# Patient Record
Sex: MALE | Race: WHITE | NOT HISPANIC OR LATINO | URBAN - METROPOLITAN AREA
[De-identification: names, ages, dates, MRNs, and addresses within clinical notes are randomized per-mention and may not be internally consistent; named-entity substitution may affect disease eponyms.]

---

## 2017-11-15 ENCOUNTER — INPATIENT (INPATIENT)
Facility: HOSPITAL | Age: 77
LOS: 19 days | Discharge: HOSPICE | DRG: 436 | End: 2017-12-05
Attending: SURGERY | Admitting: SURGERY
Payer: MEDICARE

## 2017-11-15 VITALS
HEIGHT: 72 IN | WEIGHT: 186.07 LBS | RESPIRATION RATE: 18 BRPM | TEMPERATURE: 99 F | DIASTOLIC BLOOD PRESSURE: 66 MMHG | SYSTOLIC BLOOD PRESSURE: 103 MMHG | OXYGEN SATURATION: 97 % | HEART RATE: 105 BPM

## 2017-11-15 DIAGNOSIS — Z88.1 ALLERGY STATUS TO OTHER ANTIBIOTIC AGENTS STATUS: ICD-10-CM

## 2017-11-15 DIAGNOSIS — E78.5 HYPERLIPIDEMIA, UNSPECIFIED: ICD-10-CM

## 2017-11-15 DIAGNOSIS — C22.1 INTRAHEPATIC BILE DUCT CARCINOMA: ICD-10-CM

## 2017-11-15 DIAGNOSIS — N40.0 BENIGN PROSTATIC HYPERPLASIA WITHOUT LOWER URINARY TRACT SYMPTOMS: ICD-10-CM

## 2017-11-15 DIAGNOSIS — R63.0 ANOREXIA: ICD-10-CM

## 2017-11-15 DIAGNOSIS — Z87.891 PERSONAL HISTORY OF NICOTINE DEPENDENCE: ICD-10-CM

## 2017-11-15 DIAGNOSIS — C78.00 SECONDARY MALIGNANT NEOPLASM OF UNSPECIFIED LUNG: ICD-10-CM

## 2017-11-15 DIAGNOSIS — I10 ESSENTIAL (PRIMARY) HYPERTENSION: ICD-10-CM

## 2017-11-15 DIAGNOSIS — Z79.82 LONG TERM (CURRENT) USE OF ASPIRIN: ICD-10-CM

## 2017-11-15 DIAGNOSIS — C23 MALIGNANT NEOPLASM OF GALLBLADDER: ICD-10-CM

## 2017-11-15 DIAGNOSIS — D64.9 ANEMIA, UNSPECIFIED: ICD-10-CM

## 2017-11-15 DIAGNOSIS — J91.0 MALIGNANT PLEURAL EFFUSION: ICD-10-CM

## 2017-11-15 DIAGNOSIS — C78.6 SECONDARY MALIGNANT NEOPLASM OF RETROPERITONEUM AND PERITONEUM: ICD-10-CM

## 2017-11-15 DIAGNOSIS — K21.9 GASTRO-ESOPHAGEAL REFLUX DISEASE WITHOUT ESOPHAGITIS: ICD-10-CM

## 2017-11-15 DIAGNOSIS — C78.7 SECONDARY MALIGNANT NEOPLASM OF LIVER AND INTRAHEPATIC BILE DUCT: ICD-10-CM

## 2017-11-15 DIAGNOSIS — Z51.5 ENCOUNTER FOR PALLIATIVE CARE: ICD-10-CM

## 2017-11-15 DIAGNOSIS — K92.2 GASTROINTESTINAL HEMORRHAGE, UNSPECIFIED: ICD-10-CM

## 2017-11-15 DIAGNOSIS — J90 PLEURAL EFFUSION, NOT ELSEWHERE CLASSIFIED: ICD-10-CM

## 2017-11-15 DIAGNOSIS — K92.1 MELENA: ICD-10-CM

## 2017-11-15 DIAGNOSIS — E80.7 DISORDER OF BILIRUBIN METABOLISM, UNSPECIFIED: ICD-10-CM

## 2017-11-15 DIAGNOSIS — Z90.49 ACQUIRED ABSENCE OF OTHER SPECIFIED PARTS OF DIGESTIVE TRACT: Chronic | ICD-10-CM

## 2017-11-15 LAB
ALBUMIN SERPL ELPH-MCNC: 2.3 G/DL — LOW (ref 3.3–5)
ALP SERPL-CCNC: 1810 U/L — HIGH (ref 40–120)
ALT FLD-CCNC: 250 U/L — HIGH (ref 10–45)
ANION GAP SERPL CALC-SCNC: 15 MMOL/L — SIGNIFICANT CHANGE UP (ref 5–17)
APTT BLD: 30.7 SEC — SIGNIFICANT CHANGE UP (ref 27.5–37.4)
AST SERPL-CCNC: 386 U/L — HIGH (ref 10–40)
BILIRUB SERPL-MCNC: 19.1 MG/DL — HIGH (ref 0.2–1.2)
BLD GP AB SCN SERPL QL: NEGATIVE — SIGNIFICANT CHANGE UP
BLD GP AB SCN SERPL QL: NEGATIVE — SIGNIFICANT CHANGE UP
BUN SERPL-MCNC: 28 MG/DL — HIGH (ref 7–23)
CALCIUM SERPL-MCNC: 8.6 MG/DL — SIGNIFICANT CHANGE UP (ref 8.4–10.5)
CHLORIDE SERPL-SCNC: 93 MMOL/L — LOW (ref 96–108)
CO2 SERPL-SCNC: 23 MMOL/L — SIGNIFICANT CHANGE UP (ref 22–31)
CREAT SERPL-MCNC: 1.15 MG/DL — SIGNIFICANT CHANGE UP (ref 0.5–1.3)
GLUCOSE SERPL-MCNC: 126 MG/DL — HIGH (ref 70–99)
HCT VFR BLD CALC: 27.8 % — LOW (ref 39–50)
HGB BLD-MCNC: 9.8 G/DL — LOW (ref 13–17)
INR BLD: 1.93 — HIGH (ref 0.88–1.16)
LYMPHOCYTES # BLD AUTO: 8 % — LOW (ref 13–44)
MCHC RBC-ENTMCNC: 29.8 PG — SIGNIFICANT CHANGE UP (ref 27–34)
MCHC RBC-ENTMCNC: 35.3 G/DL — SIGNIFICANT CHANGE UP (ref 32–36)
MCV RBC AUTO: 84.5 FL — SIGNIFICANT CHANGE UP (ref 80–100)
MONOCYTES NFR BLD AUTO: 6 % — SIGNIFICANT CHANGE UP (ref 2–14)
NEUTROPHILS NFR BLD AUTO: 86 % — HIGH (ref 43–77)
PLATELET # BLD AUTO: 391 K/UL — SIGNIFICANT CHANGE UP (ref 150–400)
POTASSIUM SERPL-MCNC: 4 MMOL/L — SIGNIFICANT CHANGE UP (ref 3.5–5.3)
POTASSIUM SERPL-SCNC: 4 MMOL/L — SIGNIFICANT CHANGE UP (ref 3.5–5.3)
PROT SERPL-MCNC: 6.5 G/DL — SIGNIFICANT CHANGE UP (ref 6–8.3)
PROTHROM AB SERPL-ACNC: 21.7 SEC — HIGH (ref 9.8–12.7)
RBC # BLD: 3.29 M/UL — LOW (ref 4.2–5.8)
RBC # FLD: 16.5 % — SIGNIFICANT CHANGE UP (ref 10.3–16.9)
RH IG SCN BLD-IMP: POSITIVE — SIGNIFICANT CHANGE UP
RH IG SCN BLD-IMP: POSITIVE — SIGNIFICANT CHANGE UP
SODIUM SERPL-SCNC: 131 MMOL/L — LOW (ref 135–145)
WBC # BLD: 17.4 K/UL — HIGH (ref 3.8–10.5)
WBC # FLD AUTO: 17.4 K/UL — HIGH (ref 3.8–10.5)

## 2017-11-15 PROCEDURE — 74183 MRI ABD W/O CNTR FLWD CNTR: CPT | Mod: 26

## 2017-11-15 PROCEDURE — 99222 1ST HOSP IP/OBS MODERATE 55: CPT | Mod: GC

## 2017-11-15 PROCEDURE — 71020: CPT | Mod: 26

## 2017-11-15 PROCEDURE — 93010 ELECTROCARDIOGRAM REPORT: CPT | Mod: 77

## 2017-11-15 PROCEDURE — 99285 EMERGENCY DEPT VISIT HI MDM: CPT | Mod: 25

## 2017-11-15 PROCEDURE — 93010 ELECTROCARDIOGRAM REPORT: CPT

## 2017-11-15 RX ORDER — HYDROCHLOROTHIAZIDE 25 MG
12.5 TABLET ORAL DAILY
Qty: 0 | Refills: 0 | Status: DISCONTINUED | OUTPATIENT
Start: 2017-11-15 | End: 2017-11-22

## 2017-11-15 RX ORDER — MONTELUKAST 4 MG/1
10 TABLET, CHEWABLE ORAL AT BEDTIME
Qty: 0 | Refills: 0 | Status: DISCONTINUED | OUTPATIENT
Start: 2017-11-15 | End: 2017-11-22

## 2017-11-15 RX ORDER — ATORVASTATIN CALCIUM 80 MG/1
10 TABLET, FILM COATED ORAL AT BEDTIME
Qty: 0 | Refills: 0 | Status: DISCONTINUED | OUTPATIENT
Start: 2017-11-15 | End: 2017-11-22

## 2017-11-15 RX ORDER — PIPERACILLIN AND TAZOBACTAM 4; .5 G/20ML; G/20ML
3.38 INJECTION, POWDER, LYOPHILIZED, FOR SOLUTION INTRAVENOUS EVERY 6 HOURS
Qty: 0 | Refills: 0 | Status: DISCONTINUED | OUTPATIENT
Start: 2017-11-15 | End: 2017-11-22

## 2017-11-15 RX ORDER — ONDANSETRON 8 MG/1
4 TABLET, FILM COATED ORAL EVERY 6 HOURS
Qty: 0 | Refills: 0 | Status: DISCONTINUED | OUTPATIENT
Start: 2017-11-15 | End: 2017-11-22

## 2017-11-15 RX ORDER — ASPIRIN/CALCIUM CARB/MAGNESIUM 324 MG
0 TABLET ORAL
Qty: 0 | Refills: 0 | COMMUNITY

## 2017-11-15 RX ORDER — TAMSULOSIN HYDROCHLORIDE 0.4 MG/1
1 CAPSULE ORAL
Qty: 0 | Refills: 0 | COMMUNITY

## 2017-11-15 RX ORDER — HYDROCHLOROTHIAZIDE 25 MG
0 TABLET ORAL
Qty: 0 | Refills: 0 | COMMUNITY

## 2017-11-15 RX ORDER — DILTIAZEM HCL 120 MG
180 CAPSULE, EXT RELEASE 24 HR ORAL DAILY
Qty: 0 | Refills: 0 | Status: DISCONTINUED | OUTPATIENT
Start: 2017-11-15 | End: 2017-11-22

## 2017-11-15 RX ORDER — HYDROMORPHONE HYDROCHLORIDE 2 MG/ML
0.5 INJECTION INTRAMUSCULAR; INTRAVENOUS; SUBCUTANEOUS EVERY 4 HOURS
Qty: 0 | Refills: 0 | Status: DISCONTINUED | OUTPATIENT
Start: 2017-11-15 | End: 2017-11-22

## 2017-11-15 RX ORDER — ATORVASTATIN CALCIUM 80 MG/1
0 TABLET, FILM COATED ORAL
Qty: 0 | Refills: 0 | COMMUNITY

## 2017-11-15 RX ORDER — DILTIAZEM HCL 120 MG
0 CAPSULE, EXT RELEASE 24 HR ORAL
Qty: 0 | Refills: 0 | COMMUNITY

## 2017-11-15 RX ORDER — TAMSULOSIN HYDROCHLORIDE 0.4 MG/1
0.4 CAPSULE ORAL AT BEDTIME
Qty: 0 | Refills: 0 | Status: DISCONTINUED | OUTPATIENT
Start: 2017-11-15 | End: 2017-11-22

## 2017-11-15 RX ORDER — PANTOPRAZOLE SODIUM 20 MG/1
40 TABLET, DELAYED RELEASE ORAL
Qty: 0 | Refills: 0 | Status: DISCONTINUED | OUTPATIENT
Start: 2017-11-15 | End: 2017-11-22

## 2017-11-15 RX ORDER — HEPARIN SODIUM 5000 [USP'U]/ML
5000 INJECTION INTRAVENOUS; SUBCUTANEOUS EVERY 8 HOURS
Qty: 0 | Refills: 0 | Status: DISCONTINUED | OUTPATIENT
Start: 2017-11-15 | End: 2017-11-22

## 2017-11-15 RX ADMIN — Medication 12.5 MILLIGRAM(S): at 15:30

## 2017-11-15 RX ADMIN — HEPARIN SODIUM 5000 UNIT(S): 5000 INJECTION INTRAVENOUS; SUBCUTANEOUS at 22:37

## 2017-11-15 RX ADMIN — Medication 180 MILLIGRAM(S): at 15:30

## 2017-11-15 RX ADMIN — MONTELUKAST 10 MILLIGRAM(S): 4 TABLET, CHEWABLE ORAL at 22:37

## 2017-11-15 RX ADMIN — ATORVASTATIN CALCIUM 10 MILLIGRAM(S): 80 TABLET, FILM COATED ORAL at 22:37

## 2017-11-15 RX ADMIN — HEPARIN SODIUM 5000 UNIT(S): 5000 INJECTION INTRAVENOUS; SUBCUTANEOUS at 15:37

## 2017-11-15 RX ADMIN — PANTOPRAZOLE SODIUM 40 MILLIGRAM(S): 20 TABLET, DELAYED RELEASE ORAL at 15:30

## 2017-11-15 RX ADMIN — PIPERACILLIN AND TAZOBACTAM 200 GRAM(S): 4; .5 INJECTION, POWDER, LYOPHILIZED, FOR SOLUTION INTRAVENOUS at 18:38

## 2017-11-15 RX ADMIN — TAMSULOSIN HYDROCHLORIDE 0.4 MILLIGRAM(S): 0.4 CAPSULE ORAL at 22:37

## 2017-11-15 NOTE — H&P ADULT - NSHPPHYSICALEXAM_GEN_ALL_CORE
Gen: AOx3, NAD, jaundiced    CV: RRR, no m/r/g    Pulm: breathing comfortably on RA, decreased breath sounds on R    Abdomen: minimal tenderness in RUQ, nondistended, no guarding or rebound    Ext: WWP, no edema

## 2017-11-15 NOTE — CONSULT NOTE ADULT - ASSESSMENT
76 yo M, PMH of HTN, HPL, presenting for surgery for possible biliary malignancy, found to have a pleural effusion

## 2017-11-15 NOTE — CONSULT NOTE ADULT - PROBLEM SELECTOR RECOMMENDATION 9
On the R.  Bedside US performed by me demonstrates elevated R hemidiaphragm with some fluid below the diaphragm as well as moderate sized pleural effusion which seems simple without loculations or stranding.  DD includes reactive to liver/GB process vs malignant effusion. No exam findings to support heart failure as a cause of this effusion.  No fever or chills, no history suggestive of a pneumonia with a parapneumonic effusion.  Pt is HD stable, breathing comfortably on RA with normal saturation.  At this point would defer workup of fluid until after primary problem addressed.  If sampling of pleural fluid would assist in staging of this pt - it will be possible to do.

## 2017-11-15 NOTE — CONSULT NOTE ADULT - SUBJECTIVE AND OBJECTIVE BOX
76 yo M, PMH of HTN, arrythmia, presenting for surgery for likely biliary malignancy and obstuction. Consulted for R pleural effusion.  Pt noted some intermittent abd pain, loss of appetite and weakness in the past 2 weeks. PMD sent him for imaging followed by surgery consult for possible GB malignancy.  Denies any fever/chills, no SOB. No history of lung disease.  Past smoker - quit 25 years ago.  Currently feels weak however comfortable, lying in bed.      REVIEW OF SYSTEMS: as per HPI, ROS otherwise negative    PAST MEDICAL & SURGICAL HISTORY:      FAMILY HISTORY:      SOCIAL HISTORY:  Smoking Status: [ ] Current, [x ] Former, [ ] Never  Pack Years:    MEDICATIONS:  Pulmonary:  montelukast 10 milliGRAM(s) Oral at bedtime    Antimicrobials:  piperacillin/tazobactam IVPB. 3.375 Gram(s) IV Intermittent every 6 hours    Anticoagulants:  heparin  Injectable 5000 Unit(s) SubCutaneous every 8 hours    Onc:    GI/:  pantoprazole    Tablet 40 milliGRAM(s) Oral before breakfast    Endocrine:  atorvastatin 10 milliGRAM(s) Oral at bedtime    Cardiac:  diltiazem    milliGRAM(s) Oral daily  hydrochlorothiazide 12.5 milliGRAM(s) Oral daily  tamsulosin 0.4 milliGRAM(s) Oral at bedtime    Other Medications:  HYDROmorphone  Injectable 0.5 milliGRAM(s) IV Push every 4 hours PRN  ondansetron Injectable 4 milliGRAM(s) IV Push every 6 hours PRN      Allergies    erythromycin (Other)    Intolerances        Vital Signs Last 24 Hrs  T(C): 37.2 (15 Nov 2017 14:43), Max: 37.2 (15 Nov 2017 14:15)  T(F): 99 (15 Nov 2017 14:43), Max: 99 (15 Nov 2017 14:15)  HR: 89 (15 Nov 2017 14:43) (88 - 105)  BP: 129/77 (15 Nov 2017 14:43) (103/66 - 129/77)  BP(mean): --  RR: 17 (15 Nov 2017 14:43) (17 - 18)  SpO2: 97% (15 Nov 2017 14:43) (96% - 97%)        PHYSICAL EXAM:    General: Well developed; well nourished; in no acute distress  Eyes: PERRL, EOM intact; +scleral icterus  Head: Normocephalic; atraumatic  ENMT: No nasal discharge; airway clear  Neck: Supple;   Respiratory: decreased BS on the R, no wheezing or crackles  Cardiovascular: Regular rate and rhythm. S1 and S2 Normal;   Gastrointestinal: Soft non-tender non-distended;   Extremities:No clubbing, cyanosis or edema  Vascular: Peripheral pulses palpable 2+ bilaterally  Neurological: Alert and oriented x3  Skin: Warm and dry. significant jaundice  Musculoskeletal: Normal tone, without deformities  Psychiatric: Cooperative and appropriate mood    LABS:      CBC Full  -  ( 15 Nov 2017 12:25 )  WBC Count : 17.4 K/uL  Hemoglobin : 9.8 g/dL  Hematocrit : 27.8 %  Platelet Count - Automated : 391 K/uL  Mean Cell Volume : 84.5 fL  Mean Cell Hemoglobin : 29.8 pg  Mean Cell Hemoglobin Concentration : 35.3 g/dL  Auto Neutrophil # : x  Auto Lymphocyte # : x  Auto Monocyte # : x  Auto Eosinophil # : x  Auto Basophil # : x  Auto Neutrophil % : 86.0 %  Auto Lymphocyte % : 8.0 %  Auto Monocyte % : 6.0 %  Auto Eosinophil % : x  Auto Basophil % : x    11-15    131<L>  |  93<L>  |  28<H>  ----------------------------<  126<H>  4.0   |  23  |  1.15    Ca    8.6      15 Nov 2017 12:25    TPro  6.5  /  Alb  2.3<L>  /  TBili  19.1<H>  /  DBili  x   /  AST  386<H>  /  ALT  250<H>  /  AlkPhos  1810<H>  11-15    PT/INR - ( 15 Nov 2017 12:25 )   PT: 21.7 sec;   INR: 1.93          PTT - ( 15 Nov 2017 12:25 )  PTT:30.7 sec                  RADIOLOGY & ADDITIONAL STUDIES (The following images were personally reviewed): CXR

## 2017-11-15 NOTE — CONSULT NOTE ADULT - SUBJECTIVE AND OBJECTIVE BOX
HPI:  77yr old M with PMHx significant for GERD, HTN, Dyslipidemia, cardiac arrhythmia, Allergic rhinitis, BPH, who was sent to the ED by his outpatient doctors for worrisome findings on CT.  Patient reports that he has been having abdominal discomfort x 2-3weeks (diffuse, intermittent, 5/10, nil radiation, no identified aggravating or alleviating factors), associated with nausea, anorexia, weight loss (15lbs since last month), dark stools and urine. Patient reports that he noticed that his eyes were turning yellow about 4days prior to presentation, and he also had some SOB mostly on exertion. He otherwise denies any emesis, diarrhea, fever, chills, abdominal distention, chest pain, palpitations, diaphoresis, dysuria.  He went to see his PCP for above c/o and was sent for some tests and imaging and referred to a surgeon, and after the tests/imaging came back he was called and told to come to the ED for further evaluation. 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    EGD - never  Colonoscopy - September 28 - Dr Sandoval in [place] per patient normal    PAST MEDICAL & SURGICAL HISTORY:  GERD  BPH (benign prostatic hyperplasia)  HLD (hyperlipidemia)  HTN (hypertension)  History of appendectomy      REVIEW OF SYSTEMS  Apart from items noted in the HPI a 10point ROS was negative	    MEDICATIONS  (STANDING):  atorvastatin 10 milliGRAM(s) Oral at bedtime  diltiazem    milliGRAM(s) Oral daily  heparin  Injectable 5000 Unit(s) SubCutaneous every 8 hours  hydrochlorothiazide 12.5 milliGRAM(s) Oral daily  montelukast 10 milliGRAM(s) Oral at bedtime  pantoprazole    Tablet 40 milliGRAM(s) Oral before breakfast  piperacillin/tazobactam IVPB. 3.375 Gram(s) IV Intermittent every 6 hours  tamsulosin 0.4 milliGRAM(s) Oral at bedtime    MEDICATIONS  (PRN):  HYDROmorphone  Injectable 0.5 milliGRAM(s) IV Push every 4 hours PRN Severe Pain (7 - 10)  ondansetron Injectable 4 milliGRAM(s) IV Push every 6 hours PRN Nausea    Allergies  erythromycin (Other)    Intolerances    SOCIAL HISTORY:  Quit tobacco use 25yrs ago, drinks socially, and denies illicit drug use    FAMILY HISTORY:  ??Hx of gallbladder ca in mother    Vital Signs Last 24 Hrs  T(C): 37.2 (15 Nov 2017 14:43), Max: 37.2 (15 Nov 2017 14:15)  T(F): 99 (15 Nov 2017 14:43), Max: 99 (15 Nov 2017 14:15)  HR: 89 (15 Nov 2017 14:43) (88 - 105)  BP: 129/77 (15 Nov 2017 14:43) (103/66 - 129/77)  BP(mean): --  RR: 17 (15 Nov 2017 14:43) (17 - 18)  SpO2: 97% (15 Nov 2017 14:43) (96% - 97%)    PHYSICAL EXAM:  GEN - elderly male, lying in bed in no distress, +icteric, afebrile, not pale  Eyes: scleral icterus  Respiratory: decreased air entry R lung   Cardiovascular: s1 s2 no M RRR  Gastrointestinal: full, soft, BS+, vague tenderness epigastric region, NG, NR, no masses or organs palpated  Extremities: +pitting LE edema  Neurological: AAOx3 non focal      LABS:               9.8    17.4  )-----------( 391      ( 15 Nov 2017 12:25 )             27.8     11-15    131<L>  |  93<L>  |  28<H>  ----------------------------<  126<H>  4.0   |  23  |  1.15    Ca    8.6      15 Nov 2017 12:25    TPro  6.5  /  Alb  2.3<L>  /  TBili  19.1<H>  /  DBili  x   /  AST  386<H>  /  ALT  250<H>  /  AlkPhos  1810<H>  11-15    PT/INR - ( 15 Nov 2017 12:25 )   PT: 21.7 sec;   INR: 1.93        PTT - ( 15 Nov 2017 12:25 )  PTT:30.7 sec      RADIOLOGY & ADDITIONAL STUDIES:

## 2017-11-15 NOTE — H&P ADULT - ASSESSMENT
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

## 2017-11-15 NOTE — ED PROVIDER NOTE - PHYSICAL EXAMINATION
CONSTITUTIONAL:  no apparent distress.   HEAD: Normocephalic; atraumatic.   EYES: PERRL; EOM intact; conjunctiva clear, +scleral icterus  ENT: normal nose; no rhinorrhea; normal pharynx with no erythema or lesions.   NECK: Supple; non-tender; no LAD  CARDIOVASCULAR: Normal S1, S2; no murmurs, rubs, or gallops. Regular rate and rhythm.   RESPIRATORY: Breathing easily; breath sounds clear and equal bilaterally; no wheezes, rhonchi, or rales.  GI: Soft; non-distended; +ventral hernia, reducible, non-tender; no palpable organomegaly.   MSK: FROM at all extremities, normal tone   EXT: No cyanosis or edema; N/V intact  SKIN: +jaundice   NEURO: A & O x 3; face symmetric; grossly unremarkable.   PSYCHOLOGICAL: The patient’s mood and manner are appropriate. CONSTITUTIONAL:  no apparent distress.   HEAD: Normocephalic; atraumatic.   EYES: PERRL; EOM intact; conjunctiva clear, +scleral icterus  ENT: normal nose; no rhinorrhea; normal pharynx with no erythema or lesions.   NECK: Supple; non-tender; no LAD  CARDIOVASCULAR: Normal S1, S2; no murmurs, rubs, or gallops. Regular rate and rhythm.   RESPIRATORY: Breathing easily; dec BS at R base   GI: Soft; non-distended; +ventral hernia, reducible, non-tender; no palpable organomegaly.   MSK: FROM at all extremities, normal tone   EXT: No cyanosis or edema; N/V intact  SKIN: +jaundice   NEURO: A & O x 3; face symmetric; grossly unremarkable.   PSYCHOLOGICAL: The patient’s mood and manner are appropriate.

## 2017-11-15 NOTE — CONSULT NOTE ADULT - ASSESSMENT
77yr old M with PMHx significant for GERD, HTN, Dyslipidemia, cardiac arrhythmia, Allergic rhinitis, BPH, who was sent to the ED by his outpatient doctors for worrisome findings on CT.    # Hyperbilirubinemia -  - likely from gall bladder cancer with mets to liver  - effusion is likely a malignant effusion  - patient is likely pending an MRCP to delineate his biliary tree   - will probably need palliative stenting to relieve his obstruction  - will plan for an ERCP    Discussed with attending Dr Bonner  GI following

## 2017-11-15 NOTE — H&P ADULT - HISTORY OF PRESENT ILLNESS
78 yo M with PMH HTN, HLD, arrhythmia, BPH presented to ED with c/o intermittent, diffuse abdominal pain for 1 month and SOB for 3 days. Pt reports 15 lb weight loss in last month and decreased appetite. Pt became jaundiced approximately one week ago and went to see his PCP who sent him for an outpatient CT scan. 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 Denies f/c/n/v/CP.

## 2017-11-15 NOTE — ED PROVIDER NOTE - ATTENDING CONTRIBUTION TO CARE
pt seen and examined by me, agree with above. 78 yo male sent by his pmd for possible biliary obstruction to see surgery.  pt with worsening jaundice, loss of appetitie, weight loss.  on exam, mildly ill appearing, nad, skin diffusely jaundiced.  heart normal.  lungs decreased bs in right base.  abd diffusely mildly tender, no rebound or guarding. pt seen by surgery, will be admitted to their service.

## 2017-11-15 NOTE — CONSULT NOTE ADULT - ATTENDING COMMENTS
Pt was seen and examined. Agree with the above. Will do ERCP today.
Patient seen and examined with house-staff during bedside rounds.  Resident note read, including vitals, physical findings, laboratory data, and radiological reports.   Revisions included below.  Direct personal management at bed side and extensive interpretation of the data.  Plan was outlined and discussed in details with the housestaff.  Decision making of high complexity  The US of the chest revealed moderate pleural effusion.  No thoracentesis at this point.  No dyspnea and base line oxygen saturation is normal.  Pulmonary status is optimized

## 2017-11-15 NOTE — ED PROVIDER NOTE - OBJECTIVE STATEMENT
78 yo M with pmh of HTN, HLD, BPH, arrhythmia, appendectomy sent in by Dr. Baker/ Dr. Newell for admission. Pt states he had an abdominal CT, MRI and CXR and was told he had "water in his lungs and a live and GB problem." Pt reports generalized abd pain x 2 weeks with loss of appetite. Associated with sob and generalized weakness. Reports 15 pound wt loss over the past 6 weeks. Also reports brown colored urine. Denies fever, chills, n/v, cp, palpitations, dizziness. Former smoker (quit 25 years ago). Admits to drinking 1 rum drink daily.

## 2017-11-15 NOTE — ED PROVIDER NOTE - MEDICAL DECISION MAKING DETAILS
76 yo M with pmh of HTN, HLD, BPH, arrhythmia, appendectomy sent in by Dr. Baker/ Dr. Newell for admission. Spoke with surgical resident pt has a probably GB malignancy with a biliary obstruction. Will be admitted to surgery. Pre-op labs, CXR, EKG ordered.

## 2017-11-15 NOTE — ED ADULT NURSE NOTE - OBJECTIVE STATEMENT
Patient arrived to ED via walk-in stating, "My doctor told me to come here, I have a liver and gallbladder problem and I have water in my lungs."  Patient is AAOx3, in NAD, jaundiced, presenting for SOB, NEWELL, abdominal pain, lack of appetite for two weeks and nausea.  Patient denies CP, vomiting, fevers, chills or any other complaints.  Crackles auscultated on left, diminished on right.  PIV placed, labs drawn and sent.  Will continue with plan of care.

## 2017-11-16 LAB
ALBUMIN SERPL ELPH-MCNC: 2.1 G/DL — LOW (ref 3.3–5)
ALP SERPL-CCNC: 1563 U/L — HIGH (ref 40–120)
ALT FLD-CCNC: 217 U/L — HIGH (ref 10–45)
ANION GAP SERPL CALC-SCNC: 13 MMOL/L — SIGNIFICANT CHANGE UP (ref 5–17)
AST SERPL-CCNC: 363 U/L — HIGH (ref 10–40)
BILIRUB DIRECT SERPL-MCNC: >10 MG/DL — HIGH (ref 0–0.2)
BILIRUB INDIRECT FLD-MCNC: <6.4 MG/DL — HIGH (ref 0.2–1)
BILIRUB SERPL-MCNC: 16.4 MG/DL — HIGH (ref 0.2–1.2)
BUN SERPL-MCNC: 28 MG/DL — HIGH (ref 7–23)
CALCIUM SERPL-MCNC: 8.2 MG/DL — LOW (ref 8.4–10.5)
CHLORIDE SERPL-SCNC: 96 MMOL/L — SIGNIFICANT CHANGE UP (ref 96–108)
CO2 SERPL-SCNC: 26 MMOL/L — SIGNIFICANT CHANGE UP (ref 22–31)
CREAT SERPL-MCNC: 1.29 MG/DL — SIGNIFICANT CHANGE UP (ref 0.5–1.3)
GLUCOSE SERPL-MCNC: 110 MG/DL — HIGH (ref 70–99)
HCT VFR BLD CALC: 21.2 % — LOW (ref 39–50)
HGB BLD-MCNC: 7.4 G/DL — LOW (ref 13–17)
MAGNESIUM SERPL-MCNC: 2.5 MG/DL — SIGNIFICANT CHANGE UP (ref 1.6–2.6)
MCHC RBC-ENTMCNC: 28.8 PG — SIGNIFICANT CHANGE UP (ref 27–34)
MCHC RBC-ENTMCNC: 34.9 G/DL — SIGNIFICANT CHANGE UP (ref 32–36)
MCV RBC AUTO: 82.5 FL — SIGNIFICANT CHANGE UP (ref 80–100)
PHOSPHATE SERPL-MCNC: 3.6 MG/DL — SIGNIFICANT CHANGE UP (ref 2.5–4.5)
PLATELET # BLD AUTO: 332 K/UL — SIGNIFICANT CHANGE UP (ref 150–400)
POTASSIUM SERPL-MCNC: 3.4 MMOL/L — LOW (ref 3.5–5.3)
POTASSIUM SERPL-SCNC: 3.4 MMOL/L — LOW (ref 3.5–5.3)
PROT SERPL-MCNC: 5.5 G/DL — LOW (ref 6–8.3)
RBC # BLD: 2.57 M/UL — LOW (ref 4.2–5.8)
RBC # FLD: 17.8 % — HIGH (ref 10.3–16.9)
SODIUM SERPL-SCNC: 135 MMOL/L — SIGNIFICANT CHANGE UP (ref 135–145)
WBC # BLD: 14.6 K/UL — HIGH (ref 3.8–10.5)
WBC # FLD AUTO: 14.6 K/UL — HIGH (ref 3.8–10.5)

## 2017-11-16 PROCEDURE — 99232 SBSQ HOSP IP/OBS MODERATE 35: CPT | Mod: GC

## 2017-11-16 RX ORDER — SODIUM CHLORIDE 9 MG/ML
1000 INJECTION, SOLUTION INTRAVENOUS
Qty: 0 | Refills: 0 | Status: DISCONTINUED | OUTPATIENT
Start: 2017-11-16 | End: 2017-11-20

## 2017-11-16 RX ORDER — INDOMETHACIN 50 MG
100 CAPSULE ORAL ONCE
Qty: 0 | Refills: 0 | Status: DISCONTINUED | OUTPATIENT
Start: 2017-11-16 | End: 2017-11-20

## 2017-11-16 RX ORDER — POTASSIUM CHLORIDE 20 MEQ
10 PACKET (EA) ORAL
Qty: 0 | Refills: 0 | Status: COMPLETED | OUTPATIENT
Start: 2017-11-16 | End: 2017-11-16

## 2017-11-16 RX ADMIN — PIPERACILLIN AND TAZOBACTAM 200 GRAM(S): 4; .5 INJECTION, POWDER, LYOPHILIZED, FOR SOLUTION INTRAVENOUS at 13:11

## 2017-11-16 RX ADMIN — PANTOPRAZOLE SODIUM 40 MILLIGRAM(S): 20 TABLET, DELAYED RELEASE ORAL at 06:37

## 2017-11-16 RX ADMIN — TAMSULOSIN HYDROCHLORIDE 0.4 MILLIGRAM(S): 0.4 CAPSULE ORAL at 21:24

## 2017-11-16 RX ADMIN — Medication 180 MILLIGRAM(S): at 06:37

## 2017-11-16 RX ADMIN — Medication 100 MILLIEQUIVALENT(S): at 13:11

## 2017-11-16 RX ADMIN — HYDROMORPHONE HYDROCHLORIDE 0.5 MILLIGRAM(S): 2 INJECTION INTRAMUSCULAR; INTRAVENOUS; SUBCUTANEOUS at 19:34

## 2017-11-16 RX ADMIN — ATORVASTATIN CALCIUM 10 MILLIGRAM(S): 80 TABLET, FILM COATED ORAL at 21:24

## 2017-11-16 RX ADMIN — SODIUM CHLORIDE 100 MILLILITER(S): 9 INJECTION, SOLUTION INTRAVENOUS at 14:35

## 2017-11-16 RX ADMIN — HEPARIN SODIUM 5000 UNIT(S): 5000 INJECTION INTRAVENOUS; SUBCUTANEOUS at 06:37

## 2017-11-16 RX ADMIN — PIPERACILLIN AND TAZOBACTAM 200 GRAM(S): 4; .5 INJECTION, POWDER, LYOPHILIZED, FOR SOLUTION INTRAVENOUS at 06:37

## 2017-11-16 RX ADMIN — MONTELUKAST 10 MILLIGRAM(S): 4 TABLET, CHEWABLE ORAL at 21:24

## 2017-11-16 RX ADMIN — PIPERACILLIN AND TAZOBACTAM 200 GRAM(S): 4; .5 INJECTION, POWDER, LYOPHILIZED, FOR SOLUTION INTRAVENOUS at 00:57

## 2017-11-16 RX ADMIN — HEPARIN SODIUM 5000 UNIT(S): 5000 INJECTION INTRAVENOUS; SUBCUTANEOUS at 14:42

## 2017-11-16 RX ADMIN — Medication 12.5 MILLIGRAM(S): at 06:37

## 2017-11-16 RX ADMIN — HEPARIN SODIUM 5000 UNIT(S): 5000 INJECTION INTRAVENOUS; SUBCUTANEOUS at 21:24

## 2017-11-16 RX ADMIN — PIPERACILLIN AND TAZOBACTAM 200 GRAM(S): 4; .5 INJECTION, POWDER, LYOPHILIZED, FOR SOLUTION INTRAVENOUS at 19:33

## 2017-11-16 RX ADMIN — Medication 100 MILLIEQUIVALENT(S): at 10:37

## 2017-11-16 RX ADMIN — Medication 100 MILLIEQUIVALENT(S): at 14:42

## 2017-11-16 NOTE — PROGRESS NOTE ADULT - SUBJECTIVE AND OBJECTIVE BOX
ON: MRCP obtained, awaiting read. ANKIT SUBJECTIVE: Patient seen and examined bedside by chief resident. Mild abdominal pain. Awaiting GI and Pulm recommendations regarding MRCP results.    diltiazem    milliGRAM(s) Oral daily  heparin  Injectable 5000 Unit(s) SubCutaneous every 8 hours  hydrochlorothiazide 12.5 milliGRAM(s) Oral daily  piperacillin/tazobactam IVPB. 3.375 Gram(s) IV Intermittent every 6 hours  tamsulosin 0.4 milliGRAM(s) Oral at bedtime      Vital Signs Last 24 Hrs  T(C): 36.9 (16 Nov 2017 09:00), Max: 37.2 (15 Nov 2017 14:15)  T(F): 98.4 (16 Nov 2017 09:00), Max: 99 (15 Nov 2017 14:15)  HR: 78 (16 Nov 2017 08:26) (78 - 105)  BP: 115/61 (16 Nov 2017 08:26) (103/66 - 141/67)  BP(mean): 83 (16 Nov 2017 08:26) (81 - 96)  RR: 17 (16 Nov 2017 08:26) (17 - 18)  SpO2: 94% (16 Nov 2017 08:26) (91% - 97%)  I&O's Detail    15 Nov 2017 07:01  -  16 Nov 2017 07:00  --------------------------------------------------------  IN:  Total IN: 0 mL    OUT:    Voided: 150 mL  Total OUT: 150 mL    Total NET: -150 mL          General: NAD, resting comfortably in bed  C/V: NSR  Pulm: Nonlabored breathing, no respiratory distress  Abd: soft, NT/ND.  Extrem: WWP, no edema, SCDs in place        LABS:                        7.4    14.6  )-----------( 332      ( 16 Nov 2017 06:21 )             21.2     11-16    135  |  96  |  28<H>  ----------------------------<  110<H>  3.4<L>   |  26  |  1.29    Ca    8.2<L>      16 Nov 2017 06:19  Phos  3.6     11-16  Mg     2.5     11-16    TPro  5.5<L>  /  Alb  2.1<L>  /  TBili  16.4<H>  /  DBili  >10.0<H>  /  AST  363<H>  /  ALT  217<H>  /  AlkPhos  1563<H>  11-16    PT/INR - ( 15 Nov 2017 12:25 )   PT: 21.7 sec;   INR: 1.93          PTT - ( 15 Nov 2017 12:25 )  PTT:30.7 sec      RADIOLOGY & ADDITIONAL STUDIES:

## 2017-11-16 NOTE — PROGRESS NOTE ADULT - ASSESSMENT
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 Pleural effusion  - Inc WOB  - Pending pulm eval    3) HTN  - diltiazem, HCTZ    4) BPH  - tamsulosin    5) HLD  - Atorvastatin 10 mg    6) Dispo: ? 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  - Carcinoma encasing and obstruction cystic duct and common hepatic duct, with intrahepatic ductal dilation.  - GI fellow to f/u after speaking to attending for ERCP    2) R. Pleural effusion  - Inc WOB  - Awaiting pulm re-evaluation in light of MRCP findings - possible palliative pleural catheter placement    3) HTN  - diltiazem, HCTZ    4) BPH  - tamsulosin    5) HLD  - Atorvastatin 10 mg    6) Dispo: ?

## 2017-11-16 NOTE — PROGRESS NOTE ADULT - ASSESSMENT
76 yo M, PMH of HTN, HPL, presenting with obstructive jaundice possible 2/2 cholangiocarcinoma, found to have a pleural effusion

## 2017-11-16 NOTE — PROGRESS NOTE ADULT - SUBJECTIVE AND OBJECTIVE BOX
Interval Events:  Patient seen and examined at bedside. Breathing is non labored, +abd pain.    MEDICATIONS:  Pulmonary:  montelukast 10 milliGRAM(s) Oral at bedtime    Antimicrobials:  piperacillin/tazobactam IVPB. 3.375 Gram(s) IV Intermittent every 6 hours    Anticoagulants:  heparin  Injectable 5000 Unit(s) SubCutaneous every 8 hours    Cardiac:  diltiazem    milliGRAM(s) Oral daily  hydrochlorothiazide 12.5 milliGRAM(s) Oral daily  tamsulosin 0.4 milliGRAM(s) Oral at bedtime      Allergies    erythromycin (Other)    Intolerances        Vital Signs Last 24 Hrs  T(C): 37.2 (16 Nov 2017 18:55), Max: 38.2 (16 Nov 2017 16:46)  T(F): 99 (16 Nov 2017 18:55), Max: 100.7 (16 Nov 2017 16:46)  HR: 62 (16 Nov 2017 18:55) (62 - 90)  BP: 146/62 (16 Nov 2017 18:55) (114/60 - 146/62)  BP(mean): 85 (16 Nov 2017 13:08) (81 - 96)  RR: 17 (16 Nov 2017 18:55) (16 - 18)  SpO2: 94% (16 Nov 2017 18:55) (91% - 95%)    11-15 @ 07:01  -  11-16 @ 07:00  --------------------------------------------------------  IN: 0 mL / OUT: 150 mL / NET: -150 mL    11-16 @ 07:01  -  11-16 @ 19:35  --------------------------------------------------------  IN: 500 mL / OUT: 250 mL / NET: 250 mL          PHYSICAL EXAM:  General: Well developed; in no acute distress  HEENT: PERRL, EOMI, + scleral icterous  Neck: Supple;   Respiratory: decreased BS on the R base  Cardiovascular: Regular rate and rhythm. S1 and S2 Normal;   Gastrointestinal: Soft, tender, non-distended; no rebound or guarding.  Extremities: WWP, no edema, no cyanosis  Neurological: Alert and oriented x3  Skin: Warm and dry. No obvious rash    LABS:      CBC Full  -  ( 16 Nov 2017 06:21 )  WBC Count : 14.6 K/uL  Hemoglobin : 7.4 g/dL  Hematocrit : 21.2 %  Platelet Count - Automated : 332 K/uL  Mean Cell Volume : 82.5 fL  Mean Cell Hemoglobin : 28.8 pg  Mean Cell Hemoglobin Concentration : 34.9 g/dL  Auto Neutrophil # : x  Auto Lymphocyte # : x  Auto Monocyte # : x  Auto Eosinophil # : x  Auto Basophil # : x  Auto Neutrophil % : x  Auto Lymphocyte % : x  Auto Monocyte % : x  Auto Eosinophil % : x  Auto Basophil % : x    11-16    135  |  96  |  28<H>  ----------------------------<  110<H>  3.4<L>   |  26  |  1.29    Ca    8.2<L>      16 Nov 2017 06:19  Phos  3.6     11-16  Mg     2.5     11-16    TPro  5.5<L>  /  Alb  2.1<L>  /  TBili  16.4<H>  /  DBili  >10.0<H>  /  AST  363<H>  /  ALT  217<H>  /  AlkPhos  1563<H>  11-16    PT/INR - ( 15 Nov 2017 12:25 )   PT: 21.7 sec;   INR: 1.93          PTT - ( 15 Nov 2017 12:25 )  PTT:30.7 sec                  RADIOLOGY imaging reviewed

## 2017-11-16 NOTE — PROGRESS NOTE ADULT - PROBLEM SELECTOR PLAN 1
moderate size on the R as seen on bedside US yesterday.  DD includes reactive 2/2 to abdominal process with seen ascites vs malignant effusion.  Pt HD stable, breathing comfortably on RA with normal oxygen saturation.  At this point no need for a therapeutic thoracentesis.   The yield of cytology for a malignant effusion is only 40%, therefore if need for staging, this is not optimal. moderate size on the R as seen on bedside US yesterday.  DD includes reactive 2/2 to abdominal process with seen ascites vs malignant effusion.  Pt HD stable, breathing comfortably on RA with normal oxygen saturation.  At this point no need for a therapeutic thoracentesis.   The yield of cytology for a malignant effusion is only 40%, therefore if need for staging, this is not optimal.  The patient is not in respiratory distress,  Palliative consult especially the patient is inoperable.  Will follow clinically

## 2017-11-17 LAB
ALBUMIN SERPL ELPH-MCNC: 2.5 G/DL — LOW (ref 3.3–5)
ALP SERPL-CCNC: 1574 U/L — HIGH (ref 40–120)
ALT FLD-CCNC: 227 U/L — HIGH (ref 10–45)
ANION GAP SERPL CALC-SCNC: 13 MMOL/L — SIGNIFICANT CHANGE UP (ref 5–17)
AST SERPL-CCNC: 337 U/L — HIGH (ref 10–40)
BILIRUB SERPL-MCNC: 15.7 MG/DL — HIGH (ref 0.2–1.2)
BUN SERPL-MCNC: 28 MG/DL — HIGH (ref 7–23)
CALCIUM SERPL-MCNC: 7.8 MG/DL — LOW (ref 8.4–10.5)
CHLORIDE SERPL-SCNC: 95 MMOL/L — LOW (ref 96–108)
CO2 SERPL-SCNC: 27 MMOL/L — SIGNIFICANT CHANGE UP (ref 22–31)
CREAT SERPL-MCNC: 1.35 MG/DL — HIGH (ref 0.5–1.3)
GLUCOSE SERPL-MCNC: 142 MG/DL — HIGH (ref 70–99)
HCT VFR BLD CALC: 18.5 % — CRITICAL LOW (ref 39–50)
HCT VFR BLD CALC: 21.9 % — LOW (ref 39–50)
HGB BLD-MCNC: 6.6 G/DL — CRITICAL LOW (ref 13–17)
HGB BLD-MCNC: 7.7 G/DL — LOW (ref 13–17)
MAGNESIUM SERPL-MCNC: 2.7 MG/DL — HIGH (ref 1.6–2.6)
MCHC RBC-ENTMCNC: 29.2 PG — SIGNIFICANT CHANGE UP (ref 27–34)
MCHC RBC-ENTMCNC: 29.9 PG — SIGNIFICANT CHANGE UP (ref 27–34)
MCHC RBC-ENTMCNC: 35.2 G/DL — SIGNIFICANT CHANGE UP (ref 32–36)
MCHC RBC-ENTMCNC: 35.7 G/DL — SIGNIFICANT CHANGE UP (ref 32–36)
MCV RBC AUTO: 83 FL — SIGNIFICANT CHANGE UP (ref 80–100)
MCV RBC AUTO: 83.7 FL — SIGNIFICANT CHANGE UP (ref 80–100)
PHOSPHATE SERPL-MCNC: 4 MG/DL — SIGNIFICANT CHANGE UP (ref 2.5–4.5)
PLATELET # BLD AUTO: 317 K/UL — SIGNIFICANT CHANGE UP (ref 150–400)
PLATELET # BLD AUTO: 327 K/UL — SIGNIFICANT CHANGE UP (ref 150–400)
POTASSIUM SERPL-MCNC: 4 MMOL/L — SIGNIFICANT CHANGE UP (ref 3.5–5.3)
POTASSIUM SERPL-SCNC: 4 MMOL/L — SIGNIFICANT CHANGE UP (ref 3.5–5.3)
PROT SERPL-MCNC: 5.5 G/DL — LOW (ref 6–8.3)
RBC # BLD: 2.21 M/UL — LOW (ref 4.2–5.8)
RBC # BLD: 2.64 M/UL — LOW (ref 4.2–5.8)
RBC # FLD: 17.2 % — HIGH (ref 10.3–16.9)
RBC # FLD: 18.1 % — HIGH (ref 10.3–16.9)
SODIUM SERPL-SCNC: 135 MMOL/L — SIGNIFICANT CHANGE UP (ref 135–145)
WBC # BLD: 16.7 K/UL — HIGH (ref 3.8–10.5)
WBC # BLD: 20.9 K/UL — HIGH (ref 3.8–10.5)
WBC # FLD AUTO: 16.7 K/UL — HIGH (ref 3.8–10.5)
WBC # FLD AUTO: 20.9 K/UL — HIGH (ref 3.8–10.5)

## 2017-11-17 PROCEDURE — 99232 SBSQ HOSP IP/OBS MODERATE 35: CPT | Mod: GC

## 2017-11-17 RX ADMIN — SODIUM CHLORIDE 150 MILLILITER(S): 9 INJECTION, SOLUTION INTRAVENOUS at 17:55

## 2017-11-17 RX ADMIN — PANTOPRAZOLE SODIUM 40 MILLIGRAM(S): 20 TABLET, DELAYED RELEASE ORAL at 07:07

## 2017-11-17 RX ADMIN — PIPERACILLIN AND TAZOBACTAM 200 GRAM(S): 4; .5 INJECTION, POWDER, LYOPHILIZED, FOR SOLUTION INTRAVENOUS at 07:07

## 2017-11-17 RX ADMIN — ATORVASTATIN CALCIUM 10 MILLIGRAM(S): 80 TABLET, FILM COATED ORAL at 21:10

## 2017-11-17 RX ADMIN — PIPERACILLIN AND TAZOBACTAM 200 GRAM(S): 4; .5 INJECTION, POWDER, LYOPHILIZED, FOR SOLUTION INTRAVENOUS at 01:44

## 2017-11-17 RX ADMIN — PIPERACILLIN AND TAZOBACTAM 200 GRAM(S): 4; .5 INJECTION, POWDER, LYOPHILIZED, FOR SOLUTION INTRAVENOUS at 13:27

## 2017-11-17 RX ADMIN — Medication 180 MILLIGRAM(S): at 07:07

## 2017-11-17 RX ADMIN — HEPARIN SODIUM 5000 UNIT(S): 5000 INJECTION INTRAVENOUS; SUBCUTANEOUS at 07:07

## 2017-11-17 RX ADMIN — MONTELUKAST 10 MILLIGRAM(S): 4 TABLET, CHEWABLE ORAL at 21:10

## 2017-11-17 RX ADMIN — PIPERACILLIN AND TAZOBACTAM 200 GRAM(S): 4; .5 INJECTION, POWDER, LYOPHILIZED, FOR SOLUTION INTRAVENOUS at 17:55

## 2017-11-17 RX ADMIN — HEPARIN SODIUM 5000 UNIT(S): 5000 INJECTION INTRAVENOUS; SUBCUTANEOUS at 21:10

## 2017-11-17 RX ADMIN — Medication 12.5 MILLIGRAM(S): at 07:07

## 2017-11-17 RX ADMIN — TAMSULOSIN HYDROCHLORIDE 0.4 MILLIGRAM(S): 0.4 CAPSULE ORAL at 21:10

## 2017-11-17 RX ADMIN — HEPARIN SODIUM 5000 UNIT(S): 5000 INJECTION INTRAVENOUS; SUBCUTANEOUS at 13:27

## 2017-11-17 NOTE — DIETITIAN INITIAL EVALUATION ADULT. - ORAL INTAKE PTA
evidenced by 15lb wt loss in 3 weeks and pt reporting lack of appetite 2/2 abdominal discomfort and nausea/poor

## 2017-11-17 NOTE — PROGRESS NOTE ADULT - SUBJECTIVE AND OBJECTIVE BOX
Pt seen and examined at bedside  No new c/o today  Feels better  Had one episode of melena/bloody bowel movement this am  Denies n/v, abdominal pain, fever, chills    MEDICATIONS:  MEDICATIONS  (STANDING):  atorvastatin 10 milliGRAM(s) Oral at bedtime  diltiazem    milliGRAM(s) Oral daily  heparin  Injectable 5000 Unit(s) SubCutaneous every 8 hours  hydrochlorothiazide 12.5 milliGRAM(s) Oral daily  indomethacin Suppository 100 milliGRAM(s) Rectal once  lactated ringers. 1000 milliLiter(s) (150 mL/Hr) IV Continuous <Continuous>  montelukast 10 milliGRAM(s) Oral at bedtime  pantoprazole    Tablet 40 milliGRAM(s) Oral before breakfast  piperacillin/tazobactam IVPB. 3.375 Gram(s) IV Intermittent every 6 hours  tamsulosin 0.4 milliGRAM(s) Oral at bedtime    MEDICATIONS  (PRN):  HYDROmorphone  Injectable 0.5 milliGRAM(s) IV Push every 4 hours PRN Severe Pain (7 - 10)  ondansetron Injectable 4 milliGRAM(s) IV Push every 6 hours PRN Nausea    Allergies  erythromycin (Other)    Intolerances      Vital Signs Last 24 Hrs  T(C): 36.1 (17 Nov 2017 05:38), Max: 38.2 (16 Nov 2017 16:46)  T(F): 96.9 (17 Nov 2017 05:38), Max: 100.7 (16 Nov 2017 16:46)  HR: 71 (17 Nov 2017 05:38) (62 - 83)  BP: 123/71 (17 Nov 2017 05:38) (119/59 - 146/62)  BP(mean): 85 (16 Nov 2017 13:08) (85 - 85)  RR: 16 (17 Nov 2017 05:38) (16 - 17)  SpO2: 98% (17 Nov 2017 05:38) (94% - 98%)    11-16 @ 07:01  -  11-17 @ 07:00  --------------------------------------------------------  IN: 1980 mL / OUT: 250 mL / NET: 1730 mL    11-17 @ 07:01  -  11-17 @ 10:02  --------------------------------------------------------  IN: 480 mL / OUT: 0 mL / NET: 480 mL      PHYSICAL EXAM:  GEN - elderly male, lying in bed in no distress, +icteric, afebrile, not pale  Eyes: scleral icterus  Gastrointestinal: full, soft, BS+, vague tenderness epigastric region, NG, NR, no masses or organs palpated  Neurological: AAOx3 non focal    LABS:  CBC Full  -  ( 17 Nov 2017 06:59 )  WBC Count : 16.7 K/uL  Hemoglobin : 6.6 g/dL  Hematocrit : 18.5 %  Platelet Count - Automated : 327 K/uL  Mean Cell Volume : 83.7 fL  Mean Cell Hemoglobin : 29.9 pg  Mean Cell Hemoglobin Concentration : 35.7 g/dL    11-17    135  |  95<L>  |  28<H>  ----------------------------<  142<H>  4.0   |  27  |  1.35<H>    Ca    7.8<L>      17 Nov 2017 07:18  Phos  4.0     11-17  Mg     2.7     11-17    TPro  5.5<L>  /  Alb  2.5<L>  /  TBili  15.7<H>  /  DBili  >10.0<H>  /  AST  337<H>  /  ALT  227<H>  /  AlkPhos  1574<H>  11-17    PT/INR - ( 15 Nov 2017 12:25 )   PT: 21.7 sec;   INR: 1.93       PTT - ( 15 Nov 2017 12:25 )  PTT:30.7 sec      RADIOLOGY & ADDITIONAL STUDIES (The following images were personally reviewed):  < from: MR Abdomen w/wo IV Cont (11.15.17 @ 22:28) >  IMPRESSION:  1. Multifocal variable sized liver masses, majority of which demonstrate central nonenhancement, including the largest masses about the gallbladder fossa. Heterogeneous region of enhancement in the brigette hepatis with apparent encasement of the cystic and common hepatic ducts with resultant extensive intrahepatic biliary ductal dilatation.   Constellation of findings is highly concerning for cholangiocarcinoma, with probably intraductal extension of tumor.    3. Distended gallbladder with internal fluid-fluid level and dependent gallstones, with loss of normal surrounding soft tissues/fat planes between the fundus and the adjacent liver. This could either represent regional tumor involvement versus superimposed cholecystitis.    4. Several scattered heterogeneous enhancing soft tissue foci along the omental surfaces of the upper abdominal bowel loops, suggestive of omental metastatic implants.    5. Small amount of upper abdominal ascites.    6. 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 This is of unclear etiology.    7. Moderate to large sized layering right pleural effusion with subjacent compressive atelectasis of the right lower lobe.

## 2017-11-17 NOTE — PROGRESS NOTE ADULT - ASSESSMENT
77yr old M with PMHx significant for GERD, HTN, Dyslipidemia, cardiac arrhythmia, Allergic rhinitis, BPH, who was sent to the ED by his outpatient doctors for worrisome findings on CT.    # Hyperbilirubinemia -  - likely from gall bladder cancer with mets to liver  - effusion is likely a malignant effusion  - sp MRCP with CHD and CBD strictures from cholangiocarcinoma with intraductal extension of tumor  - sp ERCP 11/16/17 - with sphincterotomy and placement of 2 uncovered metal stents into R and L hepatic ducts  - trend bilirubin    # Anemia -   - likely from melena and rectal bleeding   - could be upper GI bleed vs R sided lower GI bleed  - transfuse to Hgb >7  - PPI BID  - might consider EGD and c-scope to evaluate source of bleeding    Discussed with attending Dr Bonner  GI following

## 2017-11-17 NOTE — PROGRESS NOTE ADULT - SUBJECTIVE AND OBJECTIVE BOX
Interval Events:  Patient seen and examined at bedside. ANKIT overnight, breathing is "not bad".    MEDICATIONS:  Pulmonary:  montelukast 10 milliGRAM(s) Oral at bedtime    Antimicrobials:  piperacillin/tazobactam IVPB. 3.375 Gram(s) IV Intermittent every 6 hours    Anticoagulants:  heparin  Injectable 5000 Unit(s) SubCutaneous every 8 hours    Cardiac:  diltiazem    milliGRAM(s) Oral daily  hydrochlorothiazide 12.5 milliGRAM(s) Oral daily  tamsulosin 0.4 milliGRAM(s) Oral at bedtime      Allergies    erythromycin (Other)    Intolerances        Vital Signs Last 24 Hrs  T(C): 36.4 (17 Nov 2017 20:29), Max: 36.7 (17 Nov 2017 17:51)  T(F): 97.6 (17 Nov 2017 20:29), Max: 98.1 (17 Nov 2017 17:51)  HR: 75 (17 Nov 2017 20:29) (71 - 77)  BP: 132/73 (17 Nov 2017 20:29) (116/72 - 132/73)  BP(mean): --  RR: 16 (17 Nov 2017 20:29) (16 - 17)  SpO2: 95% (17 Nov 2017 20:29) (95% - 99%)    11-16 @ 07:01  -  11-17 @ 07:00  --------------------------------------------------------  IN: 1980 mL / OUT: 250 mL / NET: 1730 mL    11-17 @ 07:01  -  11-17 @ 21:56  --------------------------------------------------------  IN: 1040 mL / OUT: 0 mL / NET: 1040 mL          PHYSICAL EXAM:  General: Well developed; well nourished; in no acute distress  HEENT: PERRL, EOMI, significant icterous  Neck: Supple;  Respiratory:decreased BS on the R  Cardiovascular: Regular rate and rhythm. S1 and S2 Normal;   Gastrointestinal: Soft tender non-distended;   Extremities:WWP, no edema, no cyanosis  Neurological: Alert and oriented x3  Skin: Warm and dry. No obvious rash, sig jaundice    LABS:      CBC Full  -  ( 17 Nov 2017 20:24 )  WBC Count : 20.9 K/uL  Hemoglobin : 7.7 g/dL  Hematocrit : 21.9 %  Platelet Count - Automated : 317 K/uL  Mean Cell Volume : 83.0 fL  Mean Cell Hemoglobin : 29.2 pg  Mean Cell Hemoglobin Concentration : 35.2 g/dL  Auto Neutrophil # : x  Auto Lymphocyte # : x  Auto Monocyte # : x  Auto Eosinophil # : x  Auto Basophil # : x  Auto Neutrophil % : x  Auto Lymphocyte % : x  Auto Monocyte % : x  Auto Eosinophil % : x  Auto Basophil % : x    11-17    135  |  95<L>  |  28<H>  ----------------------------<  142<H>  4.0   |  27  |  1.35<H>    Ca    7.8<L>      17 Nov 2017 07:18  Phos  4.0     11-17  Mg     2.7     11-17    TPro  5.5<L>  /  Alb  2.5<L>  /  TBili  15.7<H>  /  DBili  >10.0<H>  /  AST  337<H>  /  ALT  227<H>  /  AlkPhos  1574<H>  11-17                      RADIOLOGY imaging reviewed

## 2017-11-17 NOTE — PROGRESS NOTE ADULT - PROBLEM SELECTOR PLAN 1
moderate size on the R as seen on bedside US  DD includes reactive 2/2 to abdominal process vs malignant effusion.  Currently HD stable, breathing comfortably on RA with normal oxygen saturation (95% RA).  At this point no need for a therapeutic thoracentesis.

## 2017-11-17 NOTE — DIETITIAN INITIAL EVALUATION ADULT. - ENERGY NEEDS
Height: 6'0" Weight: 186lbs, IBW 176lbs+/-10%, %%, BMI 25  ABW used for calculations as pt between % of IBW.  Needs adjusted 2/2 age, unintentional wt loss and hypermetabolic state

## 2017-11-17 NOTE — DIETITIAN INITIAL EVALUATION ADULT. - OTHER INFO
78yo M with h/o BPH w/ likely primary gallbladder neoplasm w/ spread to liver and R pleural effusion. 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 NPO/Clears x3days. Currently on clear liquid diet and tolerating PO. Consuming 100% meals and 1/2 EnsureClears. Reports 15lb wt loss over last 3 weeks. Pt attributes it to abdominal discomfort, nausea and overall lack of appetite/desire to eat.  Discussed purpose of EnsureClears on CLD and option of other ONS as diet advances. Denies GI distress at present. Pain controlled. NKFA or dietary restrictions. Skin: jaundiced; GI: distended per flowsheet.

## 2017-11-17 NOTE — PROGRESS NOTE ADULT - SUBJECTIVE AND OBJECTIVE BOX
ON: Dr. Hare recommends no intervention on effusion, since patient is asymptomatic. ERCP w/ stent placement, markedly dilated hepatic ducts. CLD O/N.   11/16: MRCP shows primary cholangiocarcinoma encasing and obstructing cystic and common hepatic ducts. ERCP today. Some bright red blood on toilet paper after BM today. Rectal exam showed external hemorrhoids. SUBJECTIVE: Patient seen and examined bedside by chief resident. No acute events. Tolerated  blood transfusion well.    diltiazem    milliGRAM(s) Oral daily  heparin  Injectable 5000 Unit(s) SubCutaneous every 8 hours  hydrochlorothiazide 12.5 milliGRAM(s) Oral daily  piperacillin/tazobactam IVPB. 3.375 Gram(s) IV Intermittent every 6 hours  tamsulosin 0.4 milliGRAM(s) Oral at bedtime      Vital Signs Last 24 Hrs  T(C): 36.3 (17 Nov 2017 13:38), Max: 37.2 (16 Nov 2017 18:55)  T(F): 97.4 (17 Nov 2017 13:38), Max: 99 (16 Nov 2017 18:55)  HR: 75 (17 Nov 2017 13:38) (62 - 83)  BP: 121/74 (17 Nov 2017 13:38) (116/72 - 146/62)  BP(mean): --  RR: 16 (17 Nov 2017 13:38) (16 - 17)  SpO2: 99% (17 Nov 2017 13:38) (94% - 99%)  I&O's Detail    16 Nov 2017 07:01  -  17 Nov 2017 07:00  --------------------------------------------------------  IN:    IV PiggyBack: 250 mL    lactated ringers.: 1350 mL    Oral Fluid: 280 mL    Solution: 100 mL  Total IN: 1980 mL    OUT:    Voided: 250 mL  Total OUT: 250 mL    Total NET: 1730 mL      17 Nov 2017 07:01  -  17 Nov 2017 17:48  --------------------------------------------------------  IN:    Oral Fluid: 800 mL  Total IN: 800 mL    OUT:  Total OUT: 0 mL    Total NET: 800 mL          General: NAD, resting comfortably in bed  C/V: NSR  Pulm: Nonlabored breathing, no respiratory distress  Abd: soft, NT/ND.  Extrem: WWP, no edema, SCDs in place        LABS:                        6.6    16.7  )-----------( 327      ( 17 Nov 2017 06:59 )             18.5     11-17    135  |  95<L>  |  28<H>  ----------------------------<  142<H>  4.0   |  27  |  1.35<H>    Ca    7.8<L>      17 Nov 2017 07:18  Phos  4.0     11-17  Mg     2.7     11-17    TPro  5.5<L>  /  Alb  2.5<L>  /  TBili  15.7<H>  /  DBili  >10.0<H>  /  AST  337<H>  /  ALT  227<H>  /  AlkPhos  1574<H>  11-17          RADIOLOGY & ADDITIONAL STUDIES:

## 2017-11-17 NOTE — DIETITIAN INITIAL EVALUATION ADULT. - NS AS NUTRI INTERV ED CONTENT
diet advancement process, purpose of EnsureClears on CLD, option of ONS as diet advances./Purpose of the nutrition education

## 2017-11-17 NOTE — PROGRESS NOTE ADULT - ASSESSMENT
76 yo M with PMH HTN, HLD, arrhythmia, BPH with likely primary gallbladder neoplasm with spread to liver and R pleural effusion    1) Primary cholangiocarcinoma  - MRCP suggests primary cholangiocarcinoma with mets to omentum, liver, and lung.  - Carcinoma encasing and obstruction cystic duct and common hepatic duct, with intrahepatic ductal dilation.  - ERCP w/ stent placement    2) R. Pleural effusion  - Per Dr. Hare pleural fluid is not large enough to drain    3) HTN  - diltiazem, HCTZ    4) BPH  - tamsulosin    5) HLD  - Atorvastatin 10 mg    6) Dispo: ? 78 yo M with PMH HTN, HLD, arrhythmia, BPH with likely primary gallbladder neoplasm with spread to liver and R pleural effusion    1) Primary cholangiocarcinoma  - MRCP suggests primary cholangiocarcinoma with mets to omentum, liver, and lung.  - Carcinoma encasing and obstruction cystic duct and common hepatic duct, with intrahepatic ductal dilation.  - ERCP w/ stent placement  - LFTs unchanged from yesterday.    2) R. Pleural effusion  - Per Dr. Hare pleural fluid is not large enough to drain    3) HTN  - diltiazem, HCTZ    4) BPH  - tamsulosin    5) HLD  - Atorvastatin 10 mg    6) Dispo: ?

## 2017-11-18 LAB
ALBUMIN SERPL ELPH-MCNC: 2 G/DL — LOW (ref 3.3–5)
ALP SERPL-CCNC: 1032 U/L — HIGH (ref 40–120)
ALT FLD-CCNC: 153 U/L — HIGH (ref 10–45)
ANION GAP SERPL CALC-SCNC: 14 MMOL/L — SIGNIFICANT CHANGE UP (ref 5–17)
AST SERPL-CCNC: 173 U/L — HIGH (ref 10–40)
BILIRUB SERPL-MCNC: 6.9 MG/DL — HIGH (ref 0.2–1.2)
BUN SERPL-MCNC: 28 MG/DL — HIGH (ref 7–23)
CALCIUM SERPL-MCNC: 7.6 MG/DL — LOW (ref 8.4–10.5)
CHLORIDE SERPL-SCNC: 93 MMOL/L — LOW (ref 96–108)
CO2 SERPL-SCNC: 24 MMOL/L — SIGNIFICANT CHANGE UP (ref 22–31)
CREAT SERPL-MCNC: 1.34 MG/DL — HIGH (ref 0.5–1.3)
GLUCOSE SERPL-MCNC: 127 MG/DL — HIGH (ref 70–99)
HCT VFR BLD CALC: 20.9 % — CRITICAL LOW (ref 39–50)
HCT VFR BLD CALC: 23.9 % — LOW (ref 39–50)
HGB BLD-MCNC: 7.2 G/DL — LOW (ref 13–17)
HGB BLD-MCNC: 8.4 G/DL — LOW (ref 13–17)
MAGNESIUM SERPL-MCNC: 2.2 MG/DL — SIGNIFICANT CHANGE UP (ref 1.6–2.6)
MCHC RBC-ENTMCNC: 29 PG — SIGNIFICANT CHANGE UP (ref 27–34)
MCHC RBC-ENTMCNC: 29.4 PG — SIGNIFICANT CHANGE UP (ref 27–34)
MCHC RBC-ENTMCNC: 34.4 G/DL — SIGNIFICANT CHANGE UP (ref 32–36)
MCHC RBC-ENTMCNC: 35.1 G/DL — SIGNIFICANT CHANGE UP (ref 32–36)
MCV RBC AUTO: 83.6 FL — SIGNIFICANT CHANGE UP (ref 80–100)
MCV RBC AUTO: 84.3 FL — SIGNIFICANT CHANGE UP (ref 80–100)
PHOSPHATE SERPL-MCNC: 2.3 MG/DL — LOW (ref 2.5–4.5)
PLATELET # BLD AUTO: 314 K/UL — SIGNIFICANT CHANGE UP (ref 150–400)
PLATELET # BLD AUTO: 315 K/UL — SIGNIFICANT CHANGE UP (ref 150–400)
POTASSIUM SERPL-MCNC: 3.8 MMOL/L — SIGNIFICANT CHANGE UP (ref 3.5–5.3)
POTASSIUM SERPL-SCNC: 3.8 MMOL/L — SIGNIFICANT CHANGE UP (ref 3.5–5.3)
PROT SERPL-MCNC: 5.3 G/DL — LOW (ref 6–8.3)
RBC # BLD: 2.48 M/UL — LOW (ref 4.2–5.8)
RBC # BLD: 2.86 M/UL — LOW (ref 4.2–5.8)
RBC # FLD: 16.7 % — SIGNIFICANT CHANGE UP (ref 10.3–16.9)
RBC # FLD: 16.9 % — SIGNIFICANT CHANGE UP (ref 10.3–16.9)
SODIUM SERPL-SCNC: 131 MMOL/L — LOW (ref 135–145)
WBC # BLD: 18.9 K/UL — HIGH (ref 3.8–10.5)
WBC # BLD: 19.9 K/UL — HIGH (ref 3.8–10.5)
WBC # FLD AUTO: 18.9 K/UL — HIGH (ref 3.8–10.5)
WBC # FLD AUTO: 19.9 K/UL — HIGH (ref 3.8–10.5)

## 2017-11-18 RX ORDER — BENZOCAINE AND MENTHOL 5; 1 G/100ML; G/100ML
1 LIQUID ORAL
Qty: 0 | Refills: 0 | Status: DISCONTINUED | OUTPATIENT
Start: 2017-11-18 | End: 2017-11-18

## 2017-11-18 RX ORDER — BENZOCAINE AND MENTHOL 5; 1 G/100ML; G/100ML
1 LIQUID ORAL EVERY 4 HOURS
Qty: 0 | Refills: 0 | Status: DISCONTINUED | OUTPATIENT
Start: 2017-11-18 | End: 2017-11-22

## 2017-11-18 RX ORDER — SOD SULF/SODIUM/NAHCO3/KCL/PEG
4000 SOLUTION, RECONSTITUTED, ORAL ORAL ONCE
Qty: 0 | Refills: 0 | Status: COMPLETED | OUTPATIENT
Start: 2017-11-19 | End: 2017-11-19

## 2017-11-18 RX ADMIN — Medication 12.5 MILLIGRAM(S): at 05:53

## 2017-11-18 RX ADMIN — BENZOCAINE AND MENTHOL 1 LOZENGE: 5; 1 LIQUID ORAL at 06:20

## 2017-11-18 RX ADMIN — BENZOCAINE AND MENTHOL 1 LOZENGE: 5; 1 LIQUID ORAL at 20:20

## 2017-11-18 RX ADMIN — Medication 180 MILLIGRAM(S): at 05:53

## 2017-11-18 RX ADMIN — TAMSULOSIN HYDROCHLORIDE 0.4 MILLIGRAM(S): 0.4 CAPSULE ORAL at 21:07

## 2017-11-18 RX ADMIN — HEPARIN SODIUM 5000 UNIT(S): 5000 INJECTION INTRAVENOUS; SUBCUTANEOUS at 21:07

## 2017-11-18 RX ADMIN — PIPERACILLIN AND TAZOBACTAM 200 GRAM(S): 4; .5 INJECTION, POWDER, LYOPHILIZED, FOR SOLUTION INTRAVENOUS at 00:28

## 2017-11-18 RX ADMIN — HEPARIN SODIUM 5000 UNIT(S): 5000 INJECTION INTRAVENOUS; SUBCUTANEOUS at 13:40

## 2017-11-18 RX ADMIN — PIPERACILLIN AND TAZOBACTAM 200 GRAM(S): 4; .5 INJECTION, POWDER, LYOPHILIZED, FOR SOLUTION INTRAVENOUS at 11:45

## 2017-11-18 RX ADMIN — MONTELUKAST 10 MILLIGRAM(S): 4 TABLET, CHEWABLE ORAL at 21:07

## 2017-11-18 RX ADMIN — ATORVASTATIN CALCIUM 10 MILLIGRAM(S): 80 TABLET, FILM COATED ORAL at 21:07

## 2017-11-18 RX ADMIN — Medication 85 MILLIMOLE(S): at 13:36

## 2017-11-18 RX ADMIN — PIPERACILLIN AND TAZOBACTAM 200 GRAM(S): 4; .5 INJECTION, POWDER, LYOPHILIZED, FOR SOLUTION INTRAVENOUS at 17:50

## 2017-11-18 RX ADMIN — PIPERACILLIN AND TAZOBACTAM 200 GRAM(S): 4; .5 INJECTION, POWDER, LYOPHILIZED, FOR SOLUTION INTRAVENOUS at 05:54

## 2017-11-18 RX ADMIN — HEPARIN SODIUM 5000 UNIT(S): 5000 INJECTION INTRAVENOUS; SUBCUTANEOUS at 05:53

## 2017-11-18 RX ADMIN — PANTOPRAZOLE SODIUM 40 MILLIGRAM(S): 20 TABLET, DELAYED RELEASE ORAL at 06:20

## 2017-11-18 NOTE — PROGRESS NOTE ADULT - ASSESSMENT
77yr old M with PMHx significant for GERD, HTN, Dyslipidemia, cardiac arrhythmia, Allergic rhinitis, BPH, who was sent to the ED by his outpatient doctors for worrisome findings on CT.    # Hyperbilirubinemia -  - likely from gall bladder cancer with mets to liver  - effusion is likely a malignant effusion  - sp MRCP with CHD and CBD strictures from cholangiocarcinoma with intraductal extension of tumor  - sp ERCP 11/16/17 - with sphincterotomy and placement of 2 uncovered metal stents into R and L hepatic ducts  - trend bilirubin    # Anemia -   - likely from rectal bleeding   - could be 2/2 lower GI bleed  - transfuse to Hgb >7  - PPI BID  - will plan for a c-scope to evaluate source of bleeding monday afternoon  - prep with golytely 4L sunday  - NPO midnite sunday    Discussed with attending Dr Bonner  GI following

## 2017-11-18 NOTE — PROGRESS NOTE ADULT - SUBJECTIVE AND OBJECTIVE BOX
INTERVAL HPI/OVERNIGHT EVENTS: Bloody BM, repeat CBC 7.7        SUBJECTIVE:  Flatus: [ ] YES [ ] NO             Bowel Movement: [ ] YES [ ] NO  Pain (0-10):            Pain Control Adequate: [ ] YES [ ] NO  Nausea: [ ] YES [ ] NO            Vomiting: [ ] YES [ ] NO  Diarrhea: [ ] YES [ ] NO         Constipation: [ ] YES [ ] NO     Chest Pain: [ ] YES [ ] NO    SOB:  [ ] YES [ ] NO    MEDICATIONS  (STANDING):  atorvastatin 10 milliGRAM(s) Oral at bedtime  diltiazem    milliGRAM(s) Oral daily  heparin  Injectable 5000 Unit(s) SubCutaneous every 8 hours  hydrochlorothiazide 12.5 milliGRAM(s) Oral daily  indomethacin Suppository 100 milliGRAM(s) Rectal once  lactated ringers. 1000 milliLiter(s) (150 mL/Hr) IV Continuous <Continuous>  montelukast 10 milliGRAM(s) Oral at bedtime  pantoprazole    Tablet 40 milliGRAM(s) Oral before breakfast  piperacillin/tazobactam IVPB. 3.375 Gram(s) IV Intermittent every 6 hours  tamsulosin 0.4 milliGRAM(s) Oral at bedtime    MEDICATIONS  (PRN):  HYDROmorphone  Injectable 0.5 milliGRAM(s) IV Push every 4 hours PRN Severe Pain (7 - 10)  ondansetron Injectable 4 milliGRAM(s) IV Push every 6 hours PRN Nausea      Vital Signs Last 24 Hrs  T(C): 36.4 (17 Nov 2017 20:29), Max: 36.7 (17 Nov 2017 17:51)  T(F): 97.6 (17 Nov 2017 20:29), Max: 98.1 (17 Nov 2017 17:51)  HR: 75 (17 Nov 2017 20:29) (71 - 77)  BP: 132/73 (17 Nov 2017 20:29) (116/72 - 132/73)  BP(mean): --  RR: 16 (17 Nov 2017 20:29) (16 - 17)  SpO2: 95% (17 Nov 2017 20:29) (95% - 99%)    PHYSICAL EXAM:      Constitutional: A&Ox3    Breasts:    Respiratory: non labored breathing, no respiratory distress    Cardiovascular: NSR, RRR    Gastrointestinal:                 Incision:    Genitourinary:    Extremities: (-) edema                  I&O's Detail    16 Nov 2017 07:01  -  17 Nov 2017 07:00  --------------------------------------------------------  IN:    IV PiggyBack: 250 mL    lactated ringers.: 1350 mL    Oral Fluid: 280 mL    Solution: 100 mL  Total IN: 1980 mL    OUT:    Voided: 250 mL  Total OUT: 250 mL    Total NET: 1730 mL      17 Nov 2017 07:01  -  18 Nov 2017 03:20  --------------------------------------------------------  IN:    lactated ringers.: 1050 mL    Oral Fluid: 1040 mL  Total IN: 2090 mL    OUT:    Voided: 200 mL  Total OUT: 200 mL    Total NET: 1890 mL          LABS:                        7.7    20.9  )-----------( 317      ( 17 Nov 2017 20:24 )             21.9     11-17    135  |  95<L>  |  28<H>  ----------------------------<  142<H>  4.0   |  27  |  1.35<H>    Ca    7.8<L>      17 Nov 2017 07:18  Phos  4.0     11-17  Mg     2.7     11-17    TPro  5.5<L>  /  Alb  2.5<L>  /  TBili  15.7<H>  /  DBili  >10.0<H>  /  AST  337<H>  /  ALT  227<H>  /  AlkPhos  1574<H>  11-17          RADIOLOGY & ADDITIONAL STUDIES: INTERVAL HPI/OVERNIGHT EVENTS: Bloody BM, repeat CBC 7.7    Vital Signs Last 24 Hrs  T(C): 36.4 (18 Nov 2017 05:46), Max: 36.7 (17 Nov 2017 17:51)  T(F): 97.6 (18 Nov 2017 05:46), Max: 98.1 (17 Nov 2017 17:51)  HR: 77 (18 Nov 2017 05:46) (75 - 77)  BP: 131/74 (18 Nov 2017 05:46) (118/74 - 132/73)  BP(mean): --  RR: 16 (18 Nov 2017 05:46) (16 - 17)  SpO2: 94% (18 Nov 2017 05:46) (94% - 99%)    I&O's Summary    17 Nov 2017 07:01  -  18 Nov 2017 07:00  --------------------------------------------------------  IN: 2840 mL / OUT: 200 mL / NET: 2640 mL      Gen: NAD   Abd: soft, nt/nd

## 2017-11-18 NOTE — PROGRESS NOTE ADULT - SUBJECTIVE AND OBJECTIVE BOX
Pt seen and examined at bedside  No new c/o today  Feels better  Still having episodes of melena and bloody bowel movements  Denies n/v, abdominal pain, fever, chills      MEDICATIONS:  MEDICATIONS  (STANDING):  atorvastatin 10 milliGRAM(s) Oral at bedtime  diltiazem    milliGRAM(s) Oral daily  heparin  Injectable 5000 Unit(s) SubCutaneous every 8 hours  hydrochlorothiazide 12.5 milliGRAM(s) Oral daily  indomethacin Suppository 100 milliGRAM(s) Rectal once  lactated ringers. 1000 milliLiter(s) (150 mL/Hr) IV Continuous <Continuous>  montelukast 10 milliGRAM(s) Oral at bedtime  pantoprazole    Tablet 40 milliGRAM(s) Oral before breakfast  piperacillin/tazobactam IVPB. 3.375 Gram(s) IV Intermittent every 6 hours  tamsulosin 0.4 milliGRAM(s) Oral at bedtime    MEDICATIONS  (PRN):  HYDROmorphone  Injectable 0.5 milliGRAM(s) IV Push every 4 hours PRN Severe Pain (7 - 10)  ondansetron Injectable 4 milliGRAM(s) IV Push every 6 hours PRN Nausea    Allergies  erythromycin (Other)    Intolerances      Vital Signs Last 24 Hrs  T(C): 36.5 (18 Nov 2017 13:15), Max: 36.9 (18 Nov 2017 12:27)  T(F): 97.7 (18 Nov 2017 13:15), Max: 98.4 (18 Nov 2017 12:27)  HR: 76 (18 Nov 2017 13:15) (75 - 82)  BP: 137/79 (18 Nov 2017 13:15) (118/74 - 137/79)  BP(mean): --  RR: 17 (18 Nov 2017 13:15) (16 - 18)  SpO2: 97% (18 Nov 2017 13:15) (94% - 97%)    11-17 @ 07:01  -  11-18 @ 07:00  --------------------------------------------------------  IN: 2840 mL / OUT: 200 mL / NET: 2640 mL    11-18 @ 07:01 - 11-18 @ 17:11  --------------------------------------------------------  IN: 2135 mL / OUT: 250 mL / NET: 1885 mL    PHYSICAL EXAM:  GEN - elderly male, lying in bed in no distress, +icteric, afebrile, not pale  Eyes: scleral icterus  Gastrointestinal: full, soft, BS+, vague tenderness epigastric region, NG, NR, no masses or organs palpated  Neurological: AAOx3 non focal    LABS:  CBC Full  -  ( 18 Nov 2017 07:09 )  WBC Count : 19.9 K/uL  Hemoglobin : 7.2 g/dL  Hematocrit : 20.9 %  Platelet Count - Automated : 314 K/uL  Mean Cell Volume : 84.3 fL  Mean Cell Hemoglobin : 29.0 pg  Mean Cell Hemoglobin Concentration : 34.4 g/dL    131<L>  |  93<L>  |  28<H>  ----------------------------<  127<H>  3.8   |  24  |  1.34<H>    Ca    7.6<L>      18 Nov 2017 07:09  Phos  2.3     11-18  Mg     2.2     11-18    TPro  5.3<L>  /  Alb  2.0<L>  /  TBili  6.9<H>  /  DBili  x   /  AST  173<H>  /  ALT  153<H>  /  AlkPhos  1032<H>  11-18        RADIOLOGY & ADDITIONAL STUDIES (The following images were personally reviewed):

## 2017-11-18 NOTE — PROGRESS NOTE ADULT - ASSESSMENT
76 yo M with PMH HTN, HLD, arrhythmia, BPH with likely primary gallbladder neoplasm with spread to liver and R pleural effusion    1) Primary cholangiocarcinoma  - MRCP suggests primary cholangiocarcinoma with mets to omentum, liver, and lung.  - Carcinoma encasing and obstruction cystic duct and common hepatic duct, with intrahepatic ductal dilation.  - ERCP w/ stent placement     2)Bloody BM  -C-Scope Monday  -Continue to monitor H/H    3) R. Pleural effusion  - Per Dr. Hare pleural fluid is not large enough to drain    4) HTN  - diltiazem, HCTZ    5) BPH  - tamsulosin    6) HLD  - Atorvastatin 10 mg    7) Dispo: ? 78 yo M with PMH HTN, HLD, arrhythmia, BPH with likely primary gallbladder neoplasm with spread to liver and R pleural effusion    1) Primary cholangiocarcinoma  - MRCP suggests primary cholangiocarcinoma with mets to omentum, liver, and lung.  - Carcinoma encasing and obstruction cystic duct and common hepatic duct, with intrahepatic ductal dilation.  - ERCP w/ stent placement     2)Bloody BM  -C-Scope Monday  -Continue to monitor H/H    3) R. Pleural effusion  - Per Dr. Hare pleural fluid is not large enough to drain    4) HTN  - diltiazem, HCTZ    5) BPH  - tamsulosin    6) HLD  - Atorvastatin 10 mg      - will continue to monitor CBC  - will recheck CBC again tonight and transfuse as needed   - GI to scope monday

## 2017-11-19 LAB
ANION GAP SERPL CALC-SCNC: 9 MMOL/L — SIGNIFICANT CHANGE UP (ref 5–17)
BUN SERPL-MCNC: 21 MG/DL — SIGNIFICANT CHANGE UP (ref 7–23)
CALCIUM SERPL-MCNC: 7.6 MG/DL — LOW (ref 8.4–10.5)
CHLORIDE SERPL-SCNC: 92 MMOL/L — LOW (ref 96–108)
CO2 SERPL-SCNC: 28 MMOL/L — SIGNIFICANT CHANGE UP (ref 22–31)
CREAT SERPL-MCNC: 1.36 MG/DL — HIGH (ref 0.5–1.3)
GLUCOSE SERPL-MCNC: 121 MG/DL — HIGH (ref 70–99)
HCT VFR BLD CALC: 23.5 % — LOW (ref 39–50)
HGB BLD-MCNC: 8.1 G/DL — LOW (ref 13–17)
MAGNESIUM SERPL-MCNC: 2.1 MG/DL — SIGNIFICANT CHANGE UP (ref 1.6–2.6)
MCHC RBC-ENTMCNC: 29.6 PG — SIGNIFICANT CHANGE UP (ref 27–34)
MCHC RBC-ENTMCNC: 34.5 G/DL — SIGNIFICANT CHANGE UP (ref 32–36)
MCV RBC AUTO: 85.8 FL — SIGNIFICANT CHANGE UP (ref 80–100)
PHOSPHATE SERPL-MCNC: 3.3 MG/DL — SIGNIFICANT CHANGE UP (ref 2.5–4.5)
PLATELET # BLD AUTO: 315 K/UL — SIGNIFICANT CHANGE UP (ref 150–400)
POTASSIUM SERPL-MCNC: 3.1 MMOL/L — LOW (ref 3.5–5.3)
POTASSIUM SERPL-SCNC: 3.1 MMOL/L — LOW (ref 3.5–5.3)
RBC # BLD: 2.74 M/UL — LOW (ref 4.2–5.8)
RBC # FLD: 16.6 % — SIGNIFICANT CHANGE UP (ref 10.3–16.9)
SODIUM SERPL-SCNC: 129 MMOL/L — LOW (ref 135–145)
WBC # BLD: 17.7 K/UL — HIGH (ref 3.8–10.5)
WBC # FLD AUTO: 17.7 K/UL — HIGH (ref 3.8–10.5)

## 2017-11-19 RX ORDER — SOD SULF/SODIUM/NAHCO3/KCL/PEG
4000 SOLUTION, RECONSTITUTED, ORAL ORAL ONCE
Qty: 0 | Refills: 0 | Status: DISCONTINUED | OUTPATIENT
Start: 2017-11-19 | End: 2017-11-19

## 2017-11-19 RX ORDER — POTASSIUM CHLORIDE 20 MEQ
40 PACKET (EA) ORAL ONCE
Qty: 0 | Refills: 0 | Status: COMPLETED | OUTPATIENT
Start: 2017-11-19 | End: 2017-11-19

## 2017-11-19 RX ADMIN — Medication 12.5 MILLIGRAM(S): at 05:15

## 2017-11-19 RX ADMIN — Medication 40 MILLIEQUIVALENT(S): at 11:38

## 2017-11-19 RX ADMIN — Medication 4000 MILLILITER(S): at 11:57

## 2017-11-19 RX ADMIN — ATORVASTATIN CALCIUM 10 MILLIGRAM(S): 80 TABLET, FILM COATED ORAL at 21:17

## 2017-11-19 RX ADMIN — TAMSULOSIN HYDROCHLORIDE 0.4 MILLIGRAM(S): 0.4 CAPSULE ORAL at 21:17

## 2017-11-19 RX ADMIN — SODIUM CHLORIDE 150 MILLILITER(S): 9 INJECTION, SOLUTION INTRAVENOUS at 17:32

## 2017-11-19 RX ADMIN — Medication 180 MILLIGRAM(S): at 05:15

## 2017-11-19 RX ADMIN — PIPERACILLIN AND TAZOBACTAM 200 GRAM(S): 4; .5 INJECTION, POWDER, LYOPHILIZED, FOR SOLUTION INTRAVENOUS at 11:38

## 2017-11-19 RX ADMIN — PIPERACILLIN AND TAZOBACTAM 200 GRAM(S): 4; .5 INJECTION, POWDER, LYOPHILIZED, FOR SOLUTION INTRAVENOUS at 00:08

## 2017-11-19 RX ADMIN — PIPERACILLIN AND TAZOBACTAM 200 GRAM(S): 4; .5 INJECTION, POWDER, LYOPHILIZED, FOR SOLUTION INTRAVENOUS at 17:32

## 2017-11-19 RX ADMIN — PIPERACILLIN AND TAZOBACTAM 200 GRAM(S): 4; .5 INJECTION, POWDER, LYOPHILIZED, FOR SOLUTION INTRAVENOUS at 05:32

## 2017-11-19 RX ADMIN — PANTOPRAZOLE SODIUM 40 MILLIGRAM(S): 20 TABLET, DELAYED RELEASE ORAL at 07:17

## 2017-11-19 RX ADMIN — SODIUM CHLORIDE 150 MILLILITER(S): 9 INJECTION, SOLUTION INTRAVENOUS at 07:17

## 2017-11-19 RX ADMIN — HEPARIN SODIUM 5000 UNIT(S): 5000 INJECTION INTRAVENOUS; SUBCUTANEOUS at 05:16

## 2017-11-19 RX ADMIN — HEPARIN SODIUM 5000 UNIT(S): 5000 INJECTION INTRAVENOUS; SUBCUTANEOUS at 13:43

## 2017-11-19 RX ADMIN — HEPARIN SODIUM 5000 UNIT(S): 5000 INJECTION INTRAVENOUS; SUBCUTANEOUS at 22:00

## 2017-11-19 RX ADMIN — MONTELUKAST 10 MILLIGRAM(S): 4 TABLET, CHEWABLE ORAL at 21:17

## 2017-11-19 NOTE — PROGRESS NOTE ADULT - SUBJECTIVE AND OBJECTIVE BOX
Pt seen and examined at bedside  No new c/o today  Feels better  Still having episodes of melena and bloody bowel movements  Denies n/v, abdominal pain, fever, chills      MEDICATIONS:  MEDICATIONS  (STANDING):  atorvastatin 10 milliGRAM(s) Oral at bedtime  diltiazem    milliGRAM(s) Oral daily  heparin  Injectable 5000 Unit(s) SubCutaneous every 8 hours  hydrochlorothiazide 12.5 milliGRAM(s) Oral daily  indomethacin Suppository 100 milliGRAM(s) Rectal once  lactated ringers. 1000 milliLiter(s) (150 mL/Hr) IV Continuous <Continuous>  montelukast 10 milliGRAM(s) Oral at bedtime  pantoprazole    Tablet 40 milliGRAM(s) Oral before breakfast  piperacillin/tazobactam IVPB. 3.375 Gram(s) IV Intermittent every 6 hours  tamsulosin 0.4 milliGRAM(s) Oral at bedtime    MEDICATIONS  (PRN):  benzocaine 15 mG/menthol 3.6 mG Lozenge 1 Lozenge Oral every 4 hours PRN Sore Throat  HYDROmorphone  Injectable 0.5 milliGRAM(s) IV Push every 4 hours PRN Severe Pain (7 - 10)  ondansetron Injectable 4 milliGRAM(s) IV Push every 6 hours PRN Nausea    Allergies  erythromycin (Other)    Intolerances    Vital Signs Last 24 Hrs  T(C): 36.9 (19 Nov 2017 16:17), Max: 37 (18 Nov 2017 20:34)  T(F): 98.4 (19 Nov 2017 16:17), Max: 98.6 (18 Nov 2017 20:34)  HR: 81 (19 Nov 2017 16:17) (75 - 88)  BP: 131/78 (19 Nov 2017 16:17) (128/73 - 160/82)  BP(mean): --  RR: 16 (19 Nov 2017 16:17) (16 - 17)  SpO2: 97% (19 Nov 2017 16:17) (95% - 98%)    11-18 @ 07:01  -  11-19 @ 07:00  --------------------------------------------------------  IN: 4810 mL / OUT: 250 mL / NET: 4560 mL    PHYSICAL EXAM:  GEN - elderly male, lying in bed in no distress, +icteric, afebrile, not pale  Eyes: scleral icterus  Gastrointestinal: full, soft, BS+, vague tenderness epigastric region, NG, NR, no masses or organs palpated  Neurological: AAOx3 non focal    LABS:  CBC Full  -  ( 19 Nov 2017 07:31 )  WBC Count : 17.7 K/uL  Hemoglobin : 8.1 g/dL  Hematocrit : 23.5 %  Platelet Count - Automated : 315 K/uL  Mean Cell Volume : 85.8 fL  Mean Cell Hemoglobin : 29.6 pg  Mean Cell Hemoglobin Concentration : 34.5 g/dL    129<L>  |  92<L>  |  21  ----------------------------<  121<H>  3.1<L>   |  28  |  1.36<H>    Ca    7.6<L>      19 Nov 2017 07:31  Phos  3.3     11-19  Mg     2.1     11-19    TPro  5.3<L>  /  Alb  2.0<L>  /  TBili  6.9<H>  /  DBili  x   /  AST  173<H>  /  ALT  153<H>  /  AlkPhos  1032<H>  11-18        RADIOLOGY & ADDITIONAL STUDIES (The following images were personally reviewed):

## 2017-11-19 NOTE — PROGRESS NOTE ADULT - ASSESSMENT
76 yo M with PMH HTN, HLD, arrhythmia, BPH with likely primary gallbladder neoplasm with spread to liver and R pleural effusion    1) Primary cholangiocarcinoma  - MRCP suggests primary cholangiocarcinoma with mets to omentum, liver, and lung.  - Carcinoma encasing and obstruction cystic duct and common hepatic duct, with intrahepatic ductal dilation.  - ERCP w/ stent placement  - Possible palliative consult    2) LGIB  - Post ERCP  - Bowel prep 11/19.  - Colonoscopy 11/20    3) R. Pleural effusion  - Per Dr. Hare pleural fluid is not large enough to drain    4) HTN  - diltiazem, HCTZ    5) BPH  - tamsulosin    6) HLD  - Atorvastatin 10 mg    7) Dispo: ?

## 2017-11-19 NOTE — PROGRESS NOTE ADULT - ASSESSMENT
77yr old M with PMHx significant for GERD, HTN, Dyslipidemia, cardiac arrhythmia, Allergic rhinitis, BPH, who was sent to the ED by his outpatient doctors for worrisome findings on CT.    # Hyperbilirubinemia -  - likely from gall bladder cancer with mets to liver  - effusion is likely a malignant effusion  - sp MRCP with CHD and CBD strictures from cholangiocarcinoma with intraductal extension of tumor  - sp ERCP 11/16/17 - with sphincterotomy and placement of 2 uncovered metal stents into R and L hepatic ducts  - trend bilirubin - downtrending appropriately post stent placement    # Anemia -   - likely from rectal bleeding   - could be 2/2 lower GI bleed  - transfuse to Hgb >7  - PPI BID  - will plan for a c-scope to evaluate source of bleeding monday afternoon  - prep with golytely 4L today  - NPO midnite     Discussed with attending Dr Bonner  GI following

## 2017-11-19 NOTE — PROGRESS NOTE ADULT - SUBJECTIVE AND OBJECTIVE BOX
SUBJECTIVE: Patient seen and examined bedside by chief resident. No further episodes of LGIB since last night. Bowel prep today for colonoscopy tomorrow.    diltiazem    milliGRAM(s) Oral daily  heparin  Injectable 5000 Unit(s) SubCutaneous every 8 hours  hydrochlorothiazide 12.5 milliGRAM(s) Oral daily  piperacillin/tazobactam IVPB. 3.375 Gram(s) IV Intermittent every 6 hours  tamsulosin 0.4 milliGRAM(s) Oral at bedtime      Vital Signs Last 24 Hrs  T(C): 36.1 (19 Nov 2017 05:14), Max: 37 (18 Nov 2017 20:34)  T(F): 97 (19 Nov 2017 05:14), Max: 98.6 (18 Nov 2017 20:34)  HR: 82 (19 Nov 2017 05:14) (75 - 88)  BP: 133/77 (19 Nov 2017 05:14) (124/70 - 160/82)  BP(mean): --  RR: 16 (19 Nov 2017 05:14) (16 - 18)  SpO2: 97% (19 Nov 2017 05:14) (95% - 98%)  I&O's Detail    18 Nov 2017 07:01  -  19 Nov 2017 07:00  --------------------------------------------------------  IN:    IV PiggyBack: 500 mL    lactated ringers.: 3150 mL    Oral Fluid: 360 mL    Packed Red Blood Cells: 500 mL    Solution: 300 mL  Total IN: 4810 mL    OUT:    Voided: 250 mL  Total OUT: 250 mL    Total NET: 4560 mL          General: NAD, resting comfortably in bed  C/V: NSR  Pulm: Nonlabored breathing, no respiratory distress  Abd: soft, NT/ND.  Extrem: WWP, no edema, SCDs in place        LABS:                        8.4    18.9  )-----------( 315      ( 18 Nov 2017 19:14 )             23.9     11-18    131<L>  |  93<L>  |  28<H>  ----------------------------<  127<H>  3.8   |  24  |  1.34<H>    Ca    7.6<L>      18 Nov 2017 07:09  Phos  2.3     11-18  Mg     2.2     11-18    TPro  5.3<L>  /  Alb  2.0<L>  /  TBili  6.9<H>  /  DBili  x   /  AST  173<H>  /  ALT  153<H>  /  AlkPhos  1032<H>  11-18          RADIOLOGY & ADDITIONAL STUDIES:

## 2017-11-20 LAB
ALBUMIN SERPL ELPH-MCNC: 2.1 G/DL — LOW (ref 3.3–5)
ALP SERPL-CCNC: 852 U/L — HIGH (ref 40–120)
ALT FLD-CCNC: 114 U/L — HIGH (ref 10–45)
ANION GAP SERPL CALC-SCNC: 11 MMOL/L — SIGNIFICANT CHANGE UP (ref 5–17)
ANION GAP SERPL CALC-SCNC: 12 MMOL/L — SIGNIFICANT CHANGE UP (ref 5–17)
AST SERPL-CCNC: 92 U/L — HIGH (ref 10–40)
BILIRUB SERPL-MCNC: 5.3 MG/DL — HIGH (ref 0.2–1.2)
BUN SERPL-MCNC: 16 MG/DL — SIGNIFICANT CHANGE UP (ref 7–23)
BUN SERPL-MCNC: 16 MG/DL — SIGNIFICANT CHANGE UP (ref 7–23)
CALCIUM SERPL-MCNC: 7.9 MG/DL — LOW (ref 8.4–10.5)
CALCIUM SERPL-MCNC: 7.9 MG/DL — LOW (ref 8.4–10.5)
CEA SERPL-MCNC: 32 NG/ML — HIGH (ref 0–3.8)
CHLORIDE SERPL-SCNC: 91 MMOL/L — LOW (ref 96–108)
CHLORIDE SERPL-SCNC: 92 MMOL/L — LOW (ref 96–108)
CO2 SERPL-SCNC: 29 MMOL/L — SIGNIFICANT CHANGE UP (ref 22–31)
CO2 SERPL-SCNC: 30 MMOL/L — SIGNIFICANT CHANGE UP (ref 22–31)
CREAT SERPL-MCNC: 1.34 MG/DL — HIGH (ref 0.5–1.3)
CREAT SERPL-MCNC: 1.36 MG/DL — HIGH (ref 0.5–1.3)
GLUCOSE SERPL-MCNC: 109 MG/DL — HIGH (ref 70–99)
GLUCOSE SERPL-MCNC: 124 MG/DL — HIGH (ref 70–99)
HCT VFR BLD CALC: 22 % — LOW (ref 39–50)
HGB BLD-MCNC: 7.6 G/DL — LOW (ref 13–17)
MAGNESIUM SERPL-MCNC: 2 MG/DL — SIGNIFICANT CHANGE UP (ref 1.6–2.6)
MCHC RBC-ENTMCNC: 29.8 PG — SIGNIFICANT CHANGE UP (ref 27–34)
MCHC RBC-ENTMCNC: 34.5 G/DL — SIGNIFICANT CHANGE UP (ref 32–36)
MCV RBC AUTO: 86.3 FL — SIGNIFICANT CHANGE UP (ref 80–100)
NON-GYNECOLOGICAL CYTOLOGY STUDY: SIGNIFICANT CHANGE UP
NON-GYNECOLOGICAL CYTOLOGY STUDY: SIGNIFICANT CHANGE UP
PHOSPHATE SERPL-MCNC: 2.4 MG/DL — LOW (ref 2.5–4.5)
PLATELET # BLD AUTO: 315 K/UL — SIGNIFICANT CHANGE UP (ref 150–400)
POTASSIUM SERPL-MCNC: 3 MMOL/L — LOW (ref 3.5–5.3)
POTASSIUM SERPL-MCNC: 3.2 MMOL/L — LOW (ref 3.5–5.3)
POTASSIUM SERPL-SCNC: 3 MMOL/L — LOW (ref 3.5–5.3)
POTASSIUM SERPL-SCNC: 3.2 MMOL/L — LOW (ref 3.5–5.3)
PROT SERPL-MCNC: 5.2 G/DL — LOW (ref 6–8.3)
RBC # BLD: 2.55 M/UL — LOW (ref 4.2–5.8)
RBC # FLD: 17.1 % — HIGH (ref 10.3–16.9)
SODIUM SERPL-SCNC: 132 MMOL/L — LOW (ref 135–145)
SODIUM SERPL-SCNC: 133 MMOL/L — LOW (ref 135–145)
WBC # BLD: 15.4 K/UL — HIGH (ref 3.8–10.5)
WBC # FLD AUTO: 15.4 K/UL — HIGH (ref 3.8–10.5)

## 2017-11-20 PROCEDURE — 71250 CT THORAX DX C-: CPT | Mod: 26

## 2017-11-20 PROCEDURE — 99152 MOD SED SAME PHYS/QHP 5/>YRS: CPT

## 2017-11-20 PROCEDURE — 47000 NEEDLE BIOPSY OF LIVER PERQ: CPT

## 2017-11-20 PROCEDURE — 76942 ECHO GUIDE FOR BIOPSY: CPT | Mod: 26

## 2017-11-20 RX ORDER — SODIUM CHLORIDE 9 MG/ML
1000 INJECTION INTRAMUSCULAR; INTRAVENOUS; SUBCUTANEOUS
Qty: 0 | Refills: 0 | Status: DISCONTINUED | OUTPATIENT
Start: 2017-11-20 | End: 2017-11-20

## 2017-11-20 RX ORDER — POTASSIUM CHLORIDE 20 MEQ
10 PACKET (EA) ORAL
Qty: 0 | Refills: 0 | Status: COMPLETED | OUTPATIENT
Start: 2017-11-20 | End: 2017-11-20

## 2017-11-20 RX ORDER — SODIUM CHLORIDE 9 MG/ML
1000 INJECTION INTRAMUSCULAR; INTRAVENOUS; SUBCUTANEOUS
Qty: 0 | Refills: 0 | Status: DISCONTINUED | OUTPATIENT
Start: 2017-11-20 | End: 2017-11-22

## 2017-11-20 RX ORDER — POTASSIUM PHOSPHATE, MONOBASIC POTASSIUM PHOSPHATE, DIBASIC 236; 224 MG/ML; MG/ML
30 INJECTION, SOLUTION INTRAVENOUS ONCE
Qty: 0 | Refills: 0 | Status: COMPLETED | OUTPATIENT
Start: 2017-11-20 | End: 2017-11-20

## 2017-11-20 RX ORDER — POTASSIUM PHOSPHATE, MONOBASIC POTASSIUM PHOSPHATE, DIBASIC 236; 224 MG/ML; MG/ML
15 INJECTION, SOLUTION INTRAVENOUS ONCE
Qty: 0 | Refills: 0 | Status: DISCONTINUED | OUTPATIENT
Start: 2017-11-20 | End: 2017-11-20

## 2017-11-20 RX ADMIN — Medication 180 MILLIGRAM(S): at 06:34

## 2017-11-20 RX ADMIN — PANTOPRAZOLE SODIUM 40 MILLIGRAM(S): 20 TABLET, DELAYED RELEASE ORAL at 06:34

## 2017-11-20 RX ADMIN — MONTELUKAST 10 MILLIGRAM(S): 4 TABLET, CHEWABLE ORAL at 21:29

## 2017-11-20 RX ADMIN — PIPERACILLIN AND TAZOBACTAM 200 GRAM(S): 4; .5 INJECTION, POWDER, LYOPHILIZED, FOR SOLUTION INTRAVENOUS at 06:34

## 2017-11-20 RX ADMIN — Medication 100 MILLIEQUIVALENT(S): at 18:38

## 2017-11-20 RX ADMIN — PIPERACILLIN AND TAZOBACTAM 200 GRAM(S): 4; .5 INJECTION, POWDER, LYOPHILIZED, FOR SOLUTION INTRAVENOUS at 00:07

## 2017-11-20 RX ADMIN — HEPARIN SODIUM 5000 UNIT(S): 5000 INJECTION INTRAVENOUS; SUBCUTANEOUS at 06:34

## 2017-11-20 RX ADMIN — Medication 100 MILLIEQUIVALENT(S): at 16:34

## 2017-11-20 RX ADMIN — TAMSULOSIN HYDROCHLORIDE 0.4 MILLIGRAM(S): 0.4 CAPSULE ORAL at 21:30

## 2017-11-20 RX ADMIN — PIPERACILLIN AND TAZOBACTAM 200 GRAM(S): 4; .5 INJECTION, POWDER, LYOPHILIZED, FOR SOLUTION INTRAVENOUS at 11:51

## 2017-11-20 RX ADMIN — BENZOCAINE AND MENTHOL 1 LOZENGE: 5; 1 LIQUID ORAL at 21:29

## 2017-11-20 RX ADMIN — SODIUM CHLORIDE 120 MILLILITER(S): 9 INJECTION INTRAMUSCULAR; INTRAVENOUS; SUBCUTANEOUS at 16:34

## 2017-11-20 RX ADMIN — POTASSIUM PHOSPHATE, MONOBASIC POTASSIUM PHOSPHATE, DIBASIC 85 MILLIMOLE(S): 236; 224 INJECTION, SOLUTION INTRAVENOUS at 19:37

## 2017-11-20 RX ADMIN — HEPARIN SODIUM 5000 UNIT(S): 5000 INJECTION INTRAVENOUS; SUBCUTANEOUS at 21:30

## 2017-11-20 RX ADMIN — ATORVASTATIN CALCIUM 10 MILLIGRAM(S): 80 TABLET, FILM COATED ORAL at 21:30

## 2017-11-20 RX ADMIN — Medication 12.5 MILLIGRAM(S): at 06:34

## 2017-11-20 RX ADMIN — PIPERACILLIN AND TAZOBACTAM 200 GRAM(S): 4; .5 INJECTION, POWDER, LYOPHILIZED, FOR SOLUTION INTRAVENOUS at 17:34

## 2017-11-20 RX ADMIN — PIPERACILLIN AND TAZOBACTAM 200 GRAM(S): 4; .5 INJECTION, POWDER, LYOPHILIZED, FOR SOLUTION INTRAVENOUS at 23:35

## 2017-11-20 RX ADMIN — Medication 100 MILLIEQUIVALENT(S): at 17:38

## 2017-11-20 NOTE — PROGRESS NOTE ADULT - SUBJECTIVE AND OBJECTIVE BOX
Pt seen and examined. Brushings from r hepatic duct were positive for malignant cells. I discussed this with pt.  Colonosocpy was postpone until tomorrow due to low K level    REVIEW OF SYSTEMS:  Constitutional: No fever, weight loss or fatigue  Cardiovascular: No chest pain, palpitations, dizziness or leg swelling  Gastrointestinal: No abdominal or epigastric pain. No nausea, vomiting or hematemesis; No diarrhea or constipation. No melena or hematochezia.  Skin: No itching, burning, rashes or lesions       MEDICATIONS:  MEDICATIONS  (STANDING):  atorvastatin 10 milliGRAM(s) Oral at bedtime  diltiazem    milliGRAM(s) Oral daily  heparin  Injectable 5000 Unit(s) SubCutaneous every 8 hours  hydrochlorothiazide 12.5 milliGRAM(s) Oral daily  montelukast 10 milliGRAM(s) Oral at bedtime  pantoprazole    Tablet 40 milliGRAM(s) Oral before breakfast  piperacillin/tazobactam IVPB. 3.375 Gram(s) IV Intermittent every 6 hours  sodium chloride 0.9%. 1000 milliLiter(s) (120 mL/Hr) IV Continuous <Continuous>  tamsulosin 0.4 milliGRAM(s) Oral at bedtime    MEDICATIONS  (PRN):  benzocaine 15 mG/menthol 3.6 mG Lozenge 1 Lozenge Oral every 4 hours PRN Sore Throat  HYDROmorphone  Injectable 0.5 milliGRAM(s) IV Push every 4 hours PRN Severe Pain (7 - 10)  ondansetron Injectable 4 milliGRAM(s) IV Push every 6 hours PRN Nausea      Allergies    erythromycin (Other)    Intolerances        Vital Signs Last 24 Hrs  T(C): 37 (20 Nov 2017 16:40), Max: 37.1 (20 Nov 2017 05:06)  T(F): 98.6 (20 Nov 2017 16:40), Max: 98.7 (20 Nov 2017 05:06)  HR: 83 (20 Nov 2017 16:40) (83 - 84)  BP: 121/71 (20 Nov 2017 16:40) (114/60 - 121/71)  BP(mean): --  RR: 16 (20 Nov 2017 16:40) (16 - 16)  SpO2: 97% (20 Nov 2017 16:40) (95% - 99%)    11-19 @ 07:01  -  11-20 @ 07:00  --------------------------------------------------------  IN: 3850 mL / OUT: 4200 mL / NET: -350 mL    11-20 @ 07:01  -  11-20 @ 20:42  --------------------------------------------------------  IN: 1295 mL / OUT: 250 mL / NET: 1045 mL        PHYSICAL EXAM:    General: Well developed; well nourished; in no acute distress  HEENT: MMM, conjunctiva and sclera clear  Gastrointestinal: Soft non-tender non-distended; Normal bowel sounds; No hepatosplenomegaly  Skin: Warm and dry. No obvious rash    LABS:  CBC Full  -  ( 20 Nov 2017 07:52 )  WBC Count : 15.4 K/uL  Hemoglobin : 7.6 g/dL  Hematocrit : 22.0 %  Platelet Count - Automated : 315 K/uL  Mean Cell Volume : 86.3 fL  Mean Cell Hemoglobin : 29.8 pg  Mean Cell Hemoglobin Concentration : 34.5 g/dL  Auto Neutrophil # : x  Auto Lymphocyte # : x  Auto Monocyte # : x  Auto Eosinophil # : x  Auto Basophil # : x  Auto Neutrophil % : x  Auto Lymphocyte % : x  Auto Monocyte % : x  Auto Eosinophil % : x  Auto Basophil % : x    11-20    133<L>  |  92<L>  |  16  ----------------------------<  109<H>  3.2<L>   |  30  |  1.36<H>    Ca    7.9<L>      20 Nov 2017 17:25  Phos  2.4     11-20  Mg     2.0     11-20    TPro  5.2<L>  /  Alb  2.1<L>  /  TBili  5.3<H>  /  DBili  x   /  AST  92<H>  /  ALT  114<H>  /  AlkPhos  852<H>  11-20                      RADIOLOGY & ADDITIONAL STUDIES:

## 2017-11-20 NOTE — PROGRESS NOTE ADULT - ASSESSMENT
78 yo M with PMH HTN, HLD, arrhythmia, BPH with likely primary gallbladder neoplasm with spread to liver and R pleural effusion    1) Primary cholangiocarcinoma  - MRCP suggests primary cholangiocarcinoma with mets to omentum, liver, and lung.  - Carcinoma encasing and obstruction cystic duct and common hepatic duct, with intrahepatic ductal dilation.  - ERCP w/ stent placement  - Possible palliative consult    2) LGIB  - Post ERCP  - Bowel prep 11/19.  - Colonoscopy 11/20    3) R. Pleural effusion  - Per Dr. Hare pleural fluid is not large enough to drain    4) HTN  - diltiazem, HCTZ    5) BPH  - tamsulosin    6) HLD  - Atorvastatin 10 mg    7) Dispo: ? 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  - Carcinoma encasing and obstruction cystic duct and common hepatic duct, with intrahepatic ductal dilation.  - ERCP w/ stent placement  - IR liver bx results pending  - Possible palliative consult    2) LGIB  - Post ERCP  - Bowel prep 11/19.  - Colonoscopy 11/20    3) R. Pleural effusion  - Per Dr. Hare pleural fluid is not large enough to drain    4) HTN  - diltiazem, HCTZ    5) BPH  - tamsulosin    6) HLD  - Atorvastatin 10 mg    7) Dispo: ?

## 2017-11-20 NOTE — PROGRESS NOTE ADULT - ASSESSMENT
imp: rectal bleeding  malignant biliary stricture  plan:  colonoscopy tomorrow  He already has metal stents placed which should give him a good paliation

## 2017-11-20 NOTE — PROGRESS NOTE ADULT - SUBJECTIVE AND OBJECTIVE BOX
11/19 - O/N: ANKIT, family discussion pending  O/N: ELEONORA PHAM SUBJECTIVE: Patient seen and examined bedside by chief resident. No abdominal pain.     diltiazem    milliGRAM(s) Oral daily  heparin  Injectable 5000 Unit(s) SubCutaneous every 8 hours  hydrochlorothiazide 12.5 milliGRAM(s) Oral daily  piperacillin/tazobactam IVPB. 3.375 Gram(s) IV Intermittent every 6 hours  tamsulosin 0.4 milliGRAM(s) Oral at bedtime      Vital Signs Last 24 Hrs  T(C): 37 (20 Nov 2017 16:40), Max: 37.1 (20 Nov 2017 05:06)  T(F): 98.6 (20 Nov 2017 16:40), Max: 98.7 (20 Nov 2017 05:06)  HR: 83 (20 Nov 2017 16:40) (83 - 92)  BP: 121/71 (20 Nov 2017 16:40) (114/60 - 149/86)  BP(mean): --  RR: 16 (20 Nov 2017 16:40) (16 - 16)  SpO2: 97% (20 Nov 2017 16:40) (95% - 99%)  I&O's Detail    19 Nov 2017 07:01  -  20 Nov 2017 07:00  --------------------------------------------------------  IN:    IV PiggyBack: 200 mL    lactated ringers.: 3450 mL    Solution: 200 mL  Total IN: 3850 mL    OUT:    Stool: 4200 mL  Total OUT: 4200 mL    Total NET: -350 mL      20 Nov 2017 07:01  -  20 Nov 2017 18:08  --------------------------------------------------------  IN:    IV PiggyBack: 200 mL    lactated ringers.: 525 mL    sodium chloride 0.9%.: 50 mL    Solution: 200 mL  Total IN: 975 mL    OUT:    Voided: 250 mL  Total OUT: 250 mL    Total NET: 725 mL          General: NAD, resting comfortably in bed  C/V: NSR  Pulm: Nonlabored breathing, no respiratory distress  Abd: soft, NT/ND.  Extrem: WWP, no edema, SCDs in place        LABS:                        7.6    15.4  )-----------( 315      ( 20 Nov 2017 07:52 )             22.0     11-20    133<L>  |  92<L>  |  16  ----------------------------<  109<H>  3.2<L>   |  30  |  1.36<H>    Ca    7.9<L>      20 Nov 2017 17:25  Phos  2.4     11-20  Mg     2.0     11-20    TPro  5.2<L>  /  Alb  2.1<L>  /  TBili  5.3<H>  /  DBili  x   /  AST  92<H>  /  ALT  114<H>  /  AlkPhos  852<H>  11-20          RADIOLOGY & ADDITIONAL STUDIES:

## 2017-11-21 LAB
ALBUMIN SERPL ELPH-MCNC: 1.9 G/DL — LOW (ref 3.3–5)
ALBUMIN SERPL ELPH-MCNC: 2.4 G/DL — LOW (ref 3.3–5)
ALP SERPL-CCNC: 652 U/L — HIGH (ref 40–120)
ALP SERPL-CCNC: 653 U/L — HIGH (ref 40–120)
ALT FLD-CCNC: 86 U/L — HIGH (ref 10–45)
ALT FLD-CCNC: 92 U/L — HIGH (ref 10–45)
ANION GAP SERPL CALC-SCNC: 11 MMOL/L — SIGNIFICANT CHANGE UP (ref 5–17)
ANION GAP SERPL CALC-SCNC: 11 MMOL/L — SIGNIFICANT CHANGE UP (ref 5–17)
AST SERPL-CCNC: 74 U/L — HIGH (ref 10–40)
AST SERPL-CCNC: 77 U/L — HIGH (ref 10–40)
BILIRUB DIRECT SERPL-MCNC: 2.6 MG/DL — HIGH (ref 0–0.2)
BILIRUB INDIRECT FLD-MCNC: 1.7 MG/DL — HIGH (ref 0.2–1)
BILIRUB SERPL-MCNC: 4.3 MG/DL — HIGH (ref 0.2–1.2)
BILIRUB SERPL-MCNC: 5.3 MG/DL — HIGH (ref 0.2–1.2)
BLD GP AB SCN SERPL QL: NEGATIVE — SIGNIFICANT CHANGE UP
BUN SERPL-MCNC: 15 MG/DL — SIGNIFICANT CHANGE UP (ref 7–23)
BUN SERPL-MCNC: 16 MG/DL — SIGNIFICANT CHANGE UP (ref 7–23)
CALCIUM SERPL-MCNC: 7.7 MG/DL — LOW (ref 8.4–10.5)
CALCIUM SERPL-MCNC: 7.8 MG/DL — LOW (ref 8.4–10.5)
CANCER AG19-9 SERPL-ACNC: 1142.3 U/ML — HIGH
CHLORIDE SERPL-SCNC: 95 MMOL/L — LOW (ref 96–108)
CHLORIDE SERPL-SCNC: 96 MMOL/L — SIGNIFICANT CHANGE UP (ref 96–108)
CO2 SERPL-SCNC: 26 MMOL/L — SIGNIFICANT CHANGE UP (ref 22–31)
CO2 SERPL-SCNC: 27 MMOL/L — SIGNIFICANT CHANGE UP (ref 22–31)
CREAT SERPL-MCNC: 1.46 MG/DL — HIGH (ref 0.5–1.3)
CREAT SERPL-MCNC: 1.47 MG/DL — HIGH (ref 0.5–1.3)
GLUCOSE SERPL-MCNC: 106 MG/DL — HIGH (ref 70–99)
GLUCOSE SERPL-MCNC: 113 MG/DL — HIGH (ref 70–99)
HCT VFR BLD CALC: 20.1 % — CRITICAL LOW (ref 39–50)
HCT VFR BLD CALC: 24 % — LOW (ref 39–50)
HGB BLD-MCNC: 6.9 G/DL — CRITICAL LOW (ref 13–17)
HGB BLD-MCNC: 8 G/DL — LOW (ref 13–17)
MAGNESIUM SERPL-MCNC: 2.1 MG/DL — SIGNIFICANT CHANGE UP (ref 1.6–2.6)
MCHC RBC-ENTMCNC: 29.3 PG — SIGNIFICANT CHANGE UP (ref 27–34)
MCHC RBC-ENTMCNC: 30.4 PG — SIGNIFICANT CHANGE UP (ref 27–34)
MCHC RBC-ENTMCNC: 33.3 G/DL — SIGNIFICANT CHANGE UP (ref 32–36)
MCHC RBC-ENTMCNC: 34.3 G/DL — SIGNIFICANT CHANGE UP (ref 32–36)
MCV RBC AUTO: 87.9 FL — SIGNIFICANT CHANGE UP (ref 80–100)
MCV RBC AUTO: 88.5 FL — SIGNIFICANT CHANGE UP (ref 80–100)
NON-GYNECOLOGICAL CYTOLOGY STUDY: SIGNIFICANT CHANGE UP
PHOSPHATE SERPL-MCNC: 3.2 MG/DL — SIGNIFICANT CHANGE UP (ref 2.5–4.5)
PLATELET # BLD AUTO: 323 K/UL — SIGNIFICANT CHANGE UP (ref 150–400)
PLATELET # BLD AUTO: 326 K/UL — SIGNIFICANT CHANGE UP (ref 150–400)
POTASSIUM SERPL-MCNC: 3.4 MMOL/L — LOW (ref 3.5–5.3)
POTASSIUM SERPL-MCNC: 3.6 MMOL/L — SIGNIFICANT CHANGE UP (ref 3.5–5.3)
POTASSIUM SERPL-SCNC: 3.4 MMOL/L — LOW (ref 3.5–5.3)
POTASSIUM SERPL-SCNC: 3.6 MMOL/L — SIGNIFICANT CHANGE UP (ref 3.5–5.3)
PREALB SERPL-MCNC: 11 MG/DL — LOW (ref 20–40)
PROT SERPL-MCNC: 5.1 G/DL — LOW (ref 6–8.3)
PROT SERPL-MCNC: 5.3 G/DL — LOW (ref 6–8.3)
RBC # BLD: 2.27 M/UL — LOW (ref 4.2–5.8)
RBC # BLD: 2.73 M/UL — LOW (ref 4.2–5.8)
RBC # FLD: 17.5 % — HIGH (ref 10.3–16.9)
RBC # FLD: 18 % — HIGH (ref 10.3–16.9)
RH IG SCN BLD-IMP: POSITIVE — SIGNIFICANT CHANGE UP
SODIUM SERPL-SCNC: 133 MMOL/L — LOW (ref 135–145)
SODIUM SERPL-SCNC: 133 MMOL/L — LOW (ref 135–145)
SURGICAL PATHOLOGY STUDY: SIGNIFICANT CHANGE UP
WBC # BLD: 12.6 K/UL — HIGH (ref 3.8–10.5)
WBC # BLD: 13.6 K/UL — HIGH (ref 3.8–10.5)
WBC # FLD AUTO: 12.6 K/UL — HIGH (ref 3.8–10.5)
WBC # FLD AUTO: 13.6 K/UL — HIGH (ref 3.8–10.5)

## 2017-11-21 PROCEDURE — 45378 DIAGNOSTIC COLONOSCOPY: CPT

## 2017-11-21 RX ORDER — POTASSIUM CHLORIDE 20 MEQ
10 PACKET (EA) ORAL
Qty: 0 | Refills: 0 | Status: COMPLETED | OUTPATIENT
Start: 2017-11-21 | End: 2017-11-21

## 2017-11-21 RX ADMIN — PIPERACILLIN AND TAZOBACTAM 200 GRAM(S): 4; .5 INJECTION, POWDER, LYOPHILIZED, FOR SOLUTION INTRAVENOUS at 11:30

## 2017-11-21 RX ADMIN — Medication 100 MILLIEQUIVALENT(S): at 11:20

## 2017-11-21 RX ADMIN — Medication 100 MILLIEQUIVALENT(S): at 16:08

## 2017-11-21 RX ADMIN — Medication 12.5 MILLIGRAM(S): at 06:12

## 2017-11-21 RX ADMIN — MONTELUKAST 10 MILLIGRAM(S): 4 TABLET, CHEWABLE ORAL at 23:01

## 2017-11-21 RX ADMIN — TAMSULOSIN HYDROCHLORIDE 0.4 MILLIGRAM(S): 0.4 CAPSULE ORAL at 23:01

## 2017-11-21 RX ADMIN — HEPARIN SODIUM 5000 UNIT(S): 5000 INJECTION INTRAVENOUS; SUBCUTANEOUS at 23:01

## 2017-11-21 RX ADMIN — SODIUM CHLORIDE 120 MILLILITER(S): 9 INJECTION INTRAMUSCULAR; INTRAVENOUS; SUBCUTANEOUS at 11:26

## 2017-11-21 RX ADMIN — HEPARIN SODIUM 5000 UNIT(S): 5000 INJECTION INTRAVENOUS; SUBCUTANEOUS at 06:13

## 2017-11-21 RX ADMIN — Medication 180 MILLIGRAM(S): at 06:13

## 2017-11-21 RX ADMIN — Medication 100 MILLIEQUIVALENT(S): at 19:30

## 2017-11-21 RX ADMIN — PIPERACILLIN AND TAZOBACTAM 200 GRAM(S): 4; .5 INJECTION, POWDER, LYOPHILIZED, FOR SOLUTION INTRAVENOUS at 19:16

## 2017-11-21 RX ADMIN — PIPERACILLIN AND TAZOBACTAM 200 GRAM(S): 4; .5 INJECTION, POWDER, LYOPHILIZED, FOR SOLUTION INTRAVENOUS at 06:12

## 2017-11-21 RX ADMIN — HEPARIN SODIUM 5000 UNIT(S): 5000 INJECTION INTRAVENOUS; SUBCUTANEOUS at 15:25

## 2017-11-21 RX ADMIN — ATORVASTATIN CALCIUM 10 MILLIGRAM(S): 80 TABLET, FILM COATED ORAL at 23:01

## 2017-11-21 RX ADMIN — PANTOPRAZOLE SODIUM 40 MILLIGRAM(S): 20 TABLET, DELAYED RELEASE ORAL at 06:12

## 2017-11-21 NOTE — PROGRESS NOTE ADULT - SUBJECTIVE AND OBJECTIVE BOX
Pt seen and examined. He had colonoscopy today which showed no active bleeding ? small intestinal bleed    REVIEW OF SYSTEMS:  Constitutional: No fever, weight loss or fatigue  Cardiovascular: No chest pain, palpitations, dizziness or leg swelling  Gastrointestinal: No abdominal or epigastric pain. No nausea, vomiting or hematemesis; No diarrhea or constipation. No melena or hematochezia.  Skin: No itching, burning, rashes or lesions       MEDICATIONS:  MEDICATIONS  (STANDING):  atorvastatin 10 milliGRAM(s) Oral at bedtime  diltiazem    milliGRAM(s) Oral daily  heparin  Injectable 5000 Unit(s) SubCutaneous every 8 hours  hydrochlorothiazide 12.5 milliGRAM(s) Oral daily  montelukast 10 milliGRAM(s) Oral at bedtime  pantoprazole    Tablet 40 milliGRAM(s) Oral before breakfast  piperacillin/tazobactam IVPB. 3.375 Gram(s) IV Intermittent every 6 hours  sodium chloride 0.9%. 1000 milliLiter(s) (120 mL/Hr) IV Continuous <Continuous>  tamsulosin 0.4 milliGRAM(s) Oral at bedtime    MEDICATIONS  (PRN):  benzocaine 15 mG/menthol 3.6 mG Lozenge 1 Lozenge Oral every 4 hours PRN Sore Throat  HYDROmorphone  Injectable 0.5 milliGRAM(s) IV Push every 4 hours PRN Severe Pain (7 - 10)  ondansetron Injectable 4 milliGRAM(s) IV Push every 6 hours PRN Nausea      Allergies    erythromycin (Other)    Intolerances        Vital Signs Last 24 Hrs  T(C): 37.4 (21 Nov 2017 19:22), Max: 37.4 (21 Nov 2017 15:00)  T(F): 99.4 (21 Nov 2017 19:22), Max: 99.4 (21 Nov 2017 19:22)  HR: 91 (21 Nov 2017 19:22) (80 - 91)  BP: 142/83 (21 Nov 2017 19:22) (121/76 - 142/83)  BP(mean): --  RR: 16 (21 Nov 2017 19:22) (16 - 16)  SpO2: 96% (21 Nov 2017 19:22) (90% - 96%)    11-20 @ 07:01  -  11-21 @ 07:00  --------------------------------------------------------  IN: 1295 mL / OUT: 250 mL / NET: 1045 mL    11-21 @ 07:01  -  11-21 @ 23:38  --------------------------------------------------------  IN: 1435 mL / OUT: 1251 mL / NET: 184 mL        PHYSICAL EXAM:    General: Well developed; well nourished; in no acute distress  HEENT: MMM, conjunctiva and sclera clear  Gastrointestinal: Soft non-tender non-distended; Normal bowel sounds; No hepatosplenomegaly  Skin: Warm and dry. No obvious rash    LABS:  CBC Full  -  ( 21 Nov 2017 16:29 )  WBC Count : 13.6 K/uL  Hemoglobin : 8.0 g/dL  Hematocrit : 24.0 %  Platelet Count - Automated : 323 K/uL  Mean Cell Volume : 87.9 fL  Mean Cell Hemoglobin : 29.3 pg  Mean Cell Hemoglobin Concentration : 33.3 g/dL  Auto Neutrophil # : x  Auto Lymphocyte # : x  Auto Monocyte # : x  Auto Eosinophil # : x  Auto Basophil # : x  Auto Neutrophil % : x  Auto Lymphocyte % : x  Auto Monocyte % : x  Auto Eosinophil % : x  Auto Basophil % : x    11-21    133<L>  |  96  |  16  ----------------------------<  106<H>  3.6   |  26  |  1.46<H>    Ca    7.8<L>      21 Nov 2017 16:29  Phos  3.2     11-21  Mg     2.1     11-21    TPro  5.3<L>  /  Alb  2.4<L>  /  TBili  5.3<H>  /  DBili  x   /  AST  77<H>  /  ALT  92<H>  /  AlkPhos  653<H>  11-21                      RADIOLOGY & ADDITIONAL STUDIES:

## 2017-11-21 NOTE — PROGRESS NOTE ADULT - SUBJECTIVE AND OBJECTIVE BOX
ON: Did not undergo C-scope. planning for AM.   11/20: Chest CT noncon shows diffuse mets. IR liver bx. SUBJECTIVE: Patient seen and examined bedside by chief resident. One further bloody BM. 1 PRBC given. VSS, ANKIT    diltiazem    milliGRAM(s) Oral daily  heparin  Injectable 5000 Unit(s) SubCutaneous every 8 hours  hydrochlorothiazide 12.5 milliGRAM(s) Oral daily  piperacillin/tazobactam IVPB. 3.375 Gram(s) IV Intermittent every 6 hours  tamsulosin 0.4 milliGRAM(s) Oral at bedtime      Vital Signs Last 24 Hrs  T(C): 37.4 (21 Nov 2017 15:00), Max: 37.4 (21 Nov 2017 15:00)  T(F): 99.3 (21 Nov 2017 15:00), Max: 99.3 (21 Nov 2017 15:00)  HR: 85 (21 Nov 2017 15:00) (80 - 88)  BP: 130/81 (21 Nov 2017 15:00) (121/76 - 135/68)  BP(mean): --  RR: 16 (21 Nov 2017 15:00) (16 - 17)  SpO2: 95% (21 Nov 2017 15:00) (90% - 96%)  I&O's Detail    20 Nov 2017 07:01  -  21 Nov 2017 07:00  --------------------------------------------------------  IN:    IV PiggyBack: 300 mL    lactated ringers.: 525 mL    sodium chloride 0.9%.: 185 mL    Solution: 85 mL    Solution: 200 mL  Total IN: 1295 mL    OUT:    Voided: 250 mL  Total OUT: 250 mL    Total NET: 1045 mL      21 Nov 2017 07:01  -  21 Nov 2017 16:58  --------------------------------------------------------  IN:    IV PiggyBack: 200 mL    Packed Red Blood Cells: 450 mL    sodium chloride 0.9%.: 485 mL    Solution: 100 mL  Total IN: 1235 mL    OUT:    Voided: 1050 mL  Total OUT: 1050 mL    Total NET: 185 mL          General: NAD, resting comfortably in bed  C/V: NSR  Pulm: Nonlabored breathing, no respiratory distress  Abd: soft, NT/ND.  Extrem: WWP, no edema, SCDs in place        LABS:                        8.0    13.6  )-----------( 323      ( 21 Nov 2017 16:29 )             24.0     11-21    133<L>  |  96  |  16  ----------------------------<  106<H>  3.6   |  26  |  1.46<H>    Ca    7.8<L>      21 Nov 2017 16:29  Phos  3.2     11-21  Mg     2.1     11-21    TPro  5.3<L>  /  Alb  2.4<L>  /  TBili  5.3<H>  /  DBili  x   /  AST  77<H>  /  ALT  92<H>  /  AlkPhos  653<H>  11-21          RADIOLOGY & ADDITIONAL STUDIES:

## 2017-11-21 NOTE — PROGRESS NOTE ADULT - ASSESSMENT
imp:  Metastatic biliary cancer sp two metal stents placement  GI bleed  plan:  small bowel capsule tomorrow

## 2017-11-21 NOTE — PROGRESS NOTE ADULT - ASSESSMENT
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  - Carcinoma encasing and obstruction cystic duct and common hepatic duct, with intrahepatic ductal dilation.  - ERCP w/ stent placement  - IR liver bx results pending  - Possible palliative consult    2) LGIB  - Post ERCP  - Bowel prep 11/19.  - Colonoscopy 11/20    3) R. Pleural effusion  - Per Dr. Hare pleural fluid is not large enough to drain    4) HTN  - diltiazem, HCTZ    5) BPH  - tamsulosin    6) HLD  - Atorvastatin 10 mg    7) Dispo: ? 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  - Carcinoma encasing and obstruction cystic duct and common hepatic duct, with intrahepatic ductal dilation.  - ERCP w/ stent placement  - IR liver bx results pending  - Possible palliative consult    2) LGIB  - Post ERCP  - Bowel prep 11/19.78 yo M with PMH HTN, HLD, arrhythmia, BPH with likely primary gallbladder neoplasm with spread to liver and R pleural effusion    1) Primary cholangiocarcinoma  - MRCP suggests primary cholangiocarcinoma with mets to omentum, liver, lung.  - Carcinoma encasing and obstruction cystic duct and common hepatic duct, with intrahepatic ductal dilation.  - ERCP w/ stent placement  - IR liver bx results pending  - Possible palliative consult    2) LGIB  - Post ERCP  - Bowel prep 11/19.  - Colonoscopy 11/21  - pRBC x1 (11/21)    3) R. Pleural effusion  - Per Dr. Hare pleural fluid is not large enough to drain    4) HTN  - diltiazem, HCTZ    5) BPH  - tamsulosin    6) HLD  - Atorvastatin 10 mg    7) Dispo: ?      - Colonoscopy 11/20    3) R. Pleural effusion  - Per Dr. Hare pleural fluid is not large enough to drain    4) HTN  - diltiazem, HCTZ    5) BPH  - tamsulosin    6) HLD  - Atorvastatin 10 mg    7) Dispo: ?

## 2017-11-22 LAB
ALBUMIN SERPL ELPH-MCNC: 2.1 G/DL — LOW (ref 3.3–5)
ALP SERPL-CCNC: 624 U/L — HIGH (ref 40–120)
ALT FLD-CCNC: 86 U/L — HIGH (ref 10–45)
ANION GAP SERPL CALC-SCNC: 10 MMOL/L — SIGNIFICANT CHANGE UP (ref 5–17)
AST SERPL-CCNC: 82 U/L — HIGH (ref 10–40)
BILIRUB SERPL-MCNC: 4 MG/DL — HIGH (ref 0.2–1.2)
BUN SERPL-MCNC: 15 MG/DL — SIGNIFICANT CHANGE UP (ref 7–23)
CALCIUM SERPL-MCNC: 7.6 MG/DL — LOW (ref 8.4–10.5)
CHLORIDE SERPL-SCNC: 94 MMOL/L — LOW (ref 96–108)
CO2 SERPL-SCNC: 27 MMOL/L — SIGNIFICANT CHANGE UP (ref 22–31)
CREAT SERPL-MCNC: 1.4 MG/DL — HIGH (ref 0.5–1.3)
GLUCOSE BLDC GLUCOMTR-MCNC: 120 MG/DL — HIGH (ref 70–99)
GLUCOSE SERPL-MCNC: 111 MG/DL — HIGH (ref 70–99)
HCT VFR BLD CALC: 23.1 % — LOW (ref 39–50)
HGB BLD-MCNC: 7.6 G/DL — LOW (ref 13–17)
MAGNESIUM SERPL-MCNC: 2.1 MG/DL — SIGNIFICANT CHANGE UP (ref 1.6–2.6)
MCHC RBC-ENTMCNC: 29.5 PG — SIGNIFICANT CHANGE UP (ref 27–34)
MCHC RBC-ENTMCNC: 32.9 G/DL — SIGNIFICANT CHANGE UP (ref 32–36)
MCV RBC AUTO: 89.5 FL — SIGNIFICANT CHANGE UP (ref 80–100)
PHOSPHATE SERPL-MCNC: 2.4 MG/DL — LOW (ref 2.5–4.5)
PLATELET # BLD AUTO: 329 K/UL — SIGNIFICANT CHANGE UP (ref 150–400)
POTASSIUM SERPL-MCNC: 3.6 MMOL/L — SIGNIFICANT CHANGE UP (ref 3.5–5.3)
POTASSIUM SERPL-SCNC: 3.6 MMOL/L — SIGNIFICANT CHANGE UP (ref 3.5–5.3)
PROT SERPL-MCNC: 5.1 G/DL — LOW (ref 6–8.3)
RBC # BLD: 2.58 M/UL — LOW (ref 4.2–5.8)
RBC # FLD: 18 % — HIGH (ref 10.3–16.9)
SODIUM SERPL-SCNC: 131 MMOL/L — LOW (ref 135–145)
WBC # BLD: 13.5 K/UL — HIGH (ref 3.8–10.5)
WBC # FLD AUTO: 13.5 K/UL — HIGH (ref 3.8–10.5)

## 2017-11-22 RX ORDER — PIPERACILLIN AND TAZOBACTAM 4; .5 G/20ML; G/20ML
3.38 INJECTION, POWDER, LYOPHILIZED, FOR SOLUTION INTRAVENOUS EVERY 6 HOURS
Qty: 0 | Refills: 0 | Status: DISCONTINUED | OUTPATIENT
Start: 2017-11-22 | End: 2017-11-22

## 2017-11-22 RX ORDER — POTASSIUM CHLORIDE 20 MEQ
20 PACKET (EA) ORAL ONCE
Qty: 0 | Refills: 0 | Status: COMPLETED | OUTPATIENT
Start: 2017-11-22 | End: 2017-11-22

## 2017-11-22 RX ORDER — BENZOCAINE AND MENTHOL 5; 1 G/100ML; G/100ML
1 LIQUID ORAL
Qty: 0 | Refills: 0 | Status: DISCONTINUED | OUTPATIENT
Start: 2017-11-22 | End: 2017-12-05

## 2017-11-22 RX ORDER — POTASSIUM PHOSPHATE, MONOBASIC POTASSIUM PHOSPHATE, DIBASIC 236; 224 MG/ML; MG/ML
15 INJECTION, SOLUTION INTRAVENOUS ONCE
Qty: 0 | Refills: 0 | Status: COMPLETED | OUTPATIENT
Start: 2017-11-22 | End: 2017-11-22

## 2017-11-22 RX ORDER — DILTIAZEM HCL 120 MG
180 CAPSULE, EXT RELEASE 24 HR ORAL DAILY
Qty: 0 | Refills: 0 | Status: DISCONTINUED | OUTPATIENT
Start: 2017-11-22 | End: 2017-12-05

## 2017-11-22 RX ORDER — HYDROMORPHONE HYDROCHLORIDE 2 MG/ML
0.5 INJECTION INTRAMUSCULAR; INTRAVENOUS; SUBCUTANEOUS EVERY 4 HOURS
Qty: 0 | Refills: 0 | Status: DISCONTINUED | OUTPATIENT
Start: 2017-11-22 | End: 2017-11-29

## 2017-11-22 RX ORDER — SODIUM CHLORIDE 9 MG/ML
1000 INJECTION INTRAMUSCULAR; INTRAVENOUS; SUBCUTANEOUS
Qty: 0 | Refills: 0 | Status: DISCONTINUED | OUTPATIENT
Start: 2017-11-22 | End: 2017-11-24

## 2017-11-22 RX ORDER — PANTOPRAZOLE SODIUM 20 MG/1
40 TABLET, DELAYED RELEASE ORAL
Qty: 0 | Refills: 0 | Status: DISCONTINUED | OUTPATIENT
Start: 2017-11-22 | End: 2017-12-05

## 2017-11-22 RX ORDER — SODIUM FERRIC GLUCONAT/SUCROSE 62.5MG/5ML
125 AMPUL (ML) INTRAVENOUS ONCE
Qty: 0 | Refills: 0 | Status: DISCONTINUED | OUTPATIENT
Start: 2017-11-22 | End: 2017-11-22

## 2017-11-22 RX ORDER — ONDANSETRON 8 MG/1
4 TABLET, FILM COATED ORAL EVERY 6 HOURS
Qty: 0 | Refills: 0 | Status: DISCONTINUED | OUTPATIENT
Start: 2017-11-22 | End: 2017-12-05

## 2017-11-22 RX ORDER — HEPARIN SODIUM 5000 [USP'U]/ML
5000 INJECTION INTRAVENOUS; SUBCUTANEOUS EVERY 8 HOURS
Qty: 0 | Refills: 0 | Status: DISCONTINUED | OUTPATIENT
Start: 2017-11-22 | End: 2017-12-05

## 2017-11-22 RX ORDER — SODIUM FERRIC GLUCONAT/SUCROSE 62.5MG/5ML
25 AMPUL (ML) INTRAVENOUS ONCE
Qty: 0 | Refills: 0 | Status: COMPLETED | OUTPATIENT
Start: 2017-11-22 | End: 2017-11-22

## 2017-11-22 RX ORDER — TAMSULOSIN HYDROCHLORIDE 0.4 MG/1
0.4 CAPSULE ORAL AT BEDTIME
Qty: 0 | Refills: 0 | Status: DISCONTINUED | OUTPATIENT
Start: 2017-11-22 | End: 2017-12-05

## 2017-11-22 RX ORDER — ATORVASTATIN CALCIUM 80 MG/1
10 TABLET, FILM COATED ORAL AT BEDTIME
Qty: 0 | Refills: 0 | Status: DISCONTINUED | OUTPATIENT
Start: 2017-11-22 | End: 2017-12-05

## 2017-11-22 RX ORDER — FERROUS SULFATE 325(65) MG
325 TABLET ORAL DAILY
Qty: 0 | Refills: 0 | Status: DISCONTINUED | OUTPATIENT
Start: 2017-11-23 | End: 2017-12-05

## 2017-11-22 RX ORDER — MONTELUKAST 4 MG/1
10 TABLET, CHEWABLE ORAL AT BEDTIME
Qty: 0 | Refills: 0 | Status: DISCONTINUED | OUTPATIENT
Start: 2017-11-22 | End: 2017-12-05

## 2017-11-22 RX ORDER — HYDROCHLOROTHIAZIDE 25 MG
12.5 TABLET ORAL DAILY
Qty: 0 | Refills: 0 | Status: DISCONTINUED | OUTPATIENT
Start: 2017-11-22 | End: 2017-12-05

## 2017-11-22 RX ORDER — SODIUM FERRIC GLUCONAT/SUCROSE 62.5MG/5ML
100 AMPUL (ML) INTRAVENOUS ONCE
Qty: 0 | Refills: 0 | Status: COMPLETED | OUTPATIENT
Start: 2017-11-22 | End: 2017-11-22

## 2017-11-22 RX ADMIN — Medication 1 TABLET(S): at 12:21

## 2017-11-22 RX ADMIN — Medication 180 MILLIGRAM(S): at 06:18

## 2017-11-22 RX ADMIN — PIPERACILLIN AND TAZOBACTAM 200 GRAM(S): 4; .5 INJECTION, POWDER, LYOPHILIZED, FOR SOLUTION INTRAVENOUS at 12:24

## 2017-11-22 RX ADMIN — Medication 12.5 MILLIGRAM(S): at 06:18

## 2017-11-22 RX ADMIN — BENZOCAINE AND MENTHOL 1 LOZENGE: 5; 1 LIQUID ORAL at 23:49

## 2017-11-22 RX ADMIN — SODIUM CHLORIDE 120 MILLILITER(S): 9 INJECTION INTRAMUSCULAR; INTRAVENOUS; SUBCUTANEOUS at 06:25

## 2017-11-22 RX ADMIN — HEPARIN SODIUM 5000 UNIT(S): 5000 INJECTION INTRAVENOUS; SUBCUTANEOUS at 06:18

## 2017-11-22 RX ADMIN — Medication 20 MILLIEQUIVALENT(S): at 14:19

## 2017-11-22 RX ADMIN — PIPERACILLIN AND TAZOBACTAM 200 GRAM(S): 4; .5 INJECTION, POWDER, LYOPHILIZED, FOR SOLUTION INTRAVENOUS at 06:18

## 2017-11-22 RX ADMIN — PANTOPRAZOLE SODIUM 40 MILLIGRAM(S): 20 TABLET, DELAYED RELEASE ORAL at 06:18

## 2017-11-22 RX ADMIN — TAMSULOSIN HYDROCHLORIDE 0.4 MILLIGRAM(S): 0.4 CAPSULE ORAL at 21:51

## 2017-11-22 RX ADMIN — Medication 108 MILLIGRAM(S): at 17:56

## 2017-11-22 RX ADMIN — Medication 102 MILLIGRAM(S): at 15:06

## 2017-11-22 RX ADMIN — HEPARIN SODIUM 5000 UNIT(S): 5000 INJECTION INTRAVENOUS; SUBCUTANEOUS at 21:51

## 2017-11-22 RX ADMIN — POTASSIUM PHOSPHATE, MONOBASIC POTASSIUM PHOSPHATE, DIBASIC 62.5 MILLIMOLE(S): 236; 224 INJECTION, SOLUTION INTRAVENOUS at 14:19

## 2017-11-22 RX ADMIN — PIPERACILLIN AND TAZOBACTAM 200 GRAM(S): 4; .5 INJECTION, POWDER, LYOPHILIZED, FOR SOLUTION INTRAVENOUS at 00:20

## 2017-11-22 RX ADMIN — MONTELUKAST 10 MILLIGRAM(S): 4 TABLET, CHEWABLE ORAL at 21:51

## 2017-11-22 RX ADMIN — ATORVASTATIN CALCIUM 10 MILLIGRAM(S): 80 TABLET, FILM COATED ORAL at 21:51

## 2017-11-22 RX ADMIN — HEPARIN SODIUM 5000 UNIT(S): 5000 INJECTION INTRAVENOUS; SUBCUTANEOUS at 14:19

## 2017-11-22 NOTE — PROGRESS NOTE ADULT - SUBJECTIVE AND OBJECTIVE BOX
SUBJECTIVE: Patient seen and examined bedside by chief resident. Patient reports good pain control.    diltiazem    milliGRAM(s) Oral daily  heparin  Injectable 5000 Unit(s) SubCutaneous every 8 hours  hydrochlorothiazide 12.5 milliGRAM(s) Oral daily  piperacillin/tazobactam IVPB. 3.375 Gram(s) IV Intermittent every 6 hours  tamsulosin 0.4 milliGRAM(s) Oral at bedtime      Vital Signs Last 24 Hrs  T(C): 37.2 (22 Nov 2017 14:15), Max: 37.4 (21 Nov 2017 15:00)  T(F): 98.9 (22 Nov 2017 14:15), Max: 99.4 (21 Nov 2017 19:22)  HR: 81 (22 Nov 2017 14:15) (74 - 92)  BP: 125/73 (22 Nov 2017 14:15) (100/62 - 162/77)  BP(mean): 85 (22 Nov 2017 11:35) (78 - 87)  RR: 18 (22 Nov 2017 14:15) (12 - 18)  SpO2: 94% (22 Nov 2017 14:15) (94% - 97%)  I&O's Detail    21 Nov 2017 07:01  -  22 Nov 2017 07:00  --------------------------------------------------------  IN:    IV PiggyBack: 300 mL    Packed Red Blood Cells: 450 mL    sodium chloride 0.9%: 485 mL    Solution: 200 mL  Total IN: 1435 mL    OUT:    Stool: 1 mL    Voided: 1350 mL  Total OUT: 1351 mL    Total NET: 84 mL      22 Nov 2017 07:01  -  22 Nov 2017 14:17  --------------------------------------------------------  IN:    Oral Fluid: 240 mL    sodium chloride 0.9%.: 360 mL    Solution: 100 mL  Total IN: 700 mL    OUT:    Voided: 350 mL  Total OUT: 350 mL    Total NET: 350 mL          General: NAD, resting comfortably in bed  C/V: NSR  Pulm: Nonlabored breathing, no respiratory distress  Abd: soft, NT/ND.  Extrem: WWP, no edema, SCDs in place        LABS:                        7.6    13.5  )-----------( 329      ( 22 Nov 2017 07:01 )             23.1     11-22    131<L>  |  94<L>  |  15  ----------------------------<  111<H>  3.6   |  27  |  1.40<H>    Ca    7.6<L>      22 Nov 2017 07:01  Phos  2.4     11-22  Mg     2.1     11-22    TPro  5.1<L>  /  Alb  2.1<L>  /  TBili  4.0<H>  /  DBili  x   /  AST  82<H>  /  ALT  86<H>  /  AlkPhos  624<H>  11-22          RADIOLOGY & ADDITIONAL STUDIES:

## 2017-11-22 NOTE — PROGRESS NOTE ADULT - ASSESSMENT
77yr old M with PMHx significant for GERD, HTN, Dyslipidemia, cardiac arrhythmia, Allergic rhinitis, BPH, who was sent to the ED by his outpatient doctors for worrisome findings on CT.    # Hyperbilirubinemia -  - likely from gall bladder cancer with mets to liver  - effusion is likely a malignant effusion  - sp MRCP with CHD and CBD strictures from cholangiocarcinoma with intraductal extension of tumor  - sp ERCP 11/16/17 - with sphincterotomy and placement of 2 uncovered metal stents into R and L hepatic ducts  - trend bilirubin - downtrending appropriately post stent placement  - sp chest port for possible palliative chemoRx today 11/22/17    # Anemia -   - likely from GI bleed  - sp colonoscopy 11/21/17 with evidence of old blood, but no active bleeding identified, his terminal ileum was intubated and this showed some old blood which could be due to small bowel bleed vs backwash from the colon  - transfuse to Hgb >7  - can reduce PPI to daily  - will plan for a video capsule to evaluate for small bowel bleed  - can resume normal diet    Discussed with attending Dr Bonner  GI following

## 2017-11-22 NOTE — BRIEF OPERATIVE NOTE - PROCEDURE
<<-----Click on this checkbox to enter Procedure Insertion of right tunneled central venous catheter with port  11/22/2017    Active  NALPER

## 2017-11-22 NOTE — PROGRESS NOTE ADULT - SUBJECTIVE AND OBJECTIVE BOX
Procedure: Chemoport placement  Surgeon: Dr. Becky Baker    S: Pt has no complaints. Denies CP, SOB, NEWELL, calf tenderness. Pain controlled with medication. No drainaged from surgical site - clean, dry, intact.    O:  T(C): 36.6 (11-22-17 @ 12:00), Max: 36.6 (11-22-17 @ 12:00)  T(F): 97.8 (11-22-17 @ 12:00), Max: 97.8 (11-22-17 @ 12:00)  HR: 91 (11-22-17 @ 12:00) (74 - 91)  BP: 123/73 (11-22-17 @ 12:00) (100/62 - 123/73)  RR: 17 (11-22-17 @ 12:00) (12 - 18)  SpO2: 97% (11-22-17 @ 12:00) (95% - 97%)  Wt(kg): --                        7.6    13.5  )-----------( 329      ( 22 Nov 2017 07:01 )             23.1     11-22    131<L>  |  94<L>  |  15  ----------------------------<  111<H>  3.6   |  27  |  1.40<H>    Ca    7.6<L>      22 Nov 2017 07:01  Phos  2.4     11-22  Mg     2.1     11-22    TPro  5.1<L>  /  Alb  2.1<L>  /  TBili  4.0<H>  /  DBili  x   /  AST  82<H>  /  ALT  86<H>  /  AlkPhos  624<H>  11-22      Gen: NAD, resting comfortably in bed  C/V: NSR  Pulm: Nonlabored breathing, no respiratory distress  Abd: soft, NT/ND Incision:  Extrem: WWP, no calf edema, SCDs in place      A/P: 77yMale s/p above procedure  Diet: CLD  IVF: NS @ 120 mL/hr  Pain/nausea control: dilaudid / zofran  DVT ppx: SQH Procedure: Chemoport placement  Surgeon: Dr. Becky Baker    S: Pt has no complaints. Denies CP, SOB, NEWELL, calf tenderness. Pain controlled with medication. No drainaged from surgical site - clean, dry, intact.    O:  T(C): 36.6 (11-22-17 @ 12:00), Max: 36.6 (11-22-17 @ 12:00)  T(F): 97.8 (11-22-17 @ 12:00), Max: 97.8 (11-22-17 @ 12:00)  HR: 91 (11-22-17 @ 12:00) (74 - 91)  BP: 123/73 (11-22-17 @ 12:00) (100/62 - 123/73)  RR: 17 (11-22-17 @ 12:00) (12 - 18)  SpO2: 97% (11-22-17 @ 12:00) (95% - 97%)  Wt(kg): --                        7.6    13.5  )-----------( 329      ( 22 Nov 2017 07:01 )             23.1     11-22    131<L>  |  94<L>  |  15  ----------------------------<  111<H>  3.6   |  27  |  1.40<H>    Ca    7.6<L>      22 Nov 2017 07:01  Phos  2.4     11-22  Mg     2.1     11-22    TPro  5.1<L>  /  Alb  2.1<L>  /  TBili  4.0<H>  /  DBili  x   /  AST  82<H>  /  ALT  86<H>  /  AlkPhos  624<H>  11-22      Gen: NAD, resting comfortably in bed  C/V: NSR  Pulm: Nonlabored breathing, no respiratory distress  Abd: soft, NT/ND Incision: clean, dry, intact.  Extrem: WWP, no calf edema, SCDs in place      A/P: 77yMale s/p above procedure  Diet: CLD  IVF: NS @ 120 mL/hr  Pain/nausea control: dilaudid / zofran  DVT ppx: SQH

## 2017-11-22 NOTE — PROGRESS NOTE ADULT - ASSESSMENT
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  - Carcinoma encasing and obstruction cystic duct and common hepatic duct, with intrahepatic ductal dilation.  - ERCP w/ stent placement  - IR liver bx results pending  - Possible palliative consult    2) LGIB  - Post ERCP  - Bowel prep 11/19.  - Colonoscopy 11/21  - pRBC x1 (11/21)    3) R. Pleural effusion  - Per Dr. Hare pleural fluid is not large enough to drain    4) HTN  - diltiazem, HCTZ    5) BPH  - tamsulosin    6) HLD  - Atorvastatin 10 mg    7) Dispo: ?

## 2017-11-22 NOTE — BRIEF OPERATIVE NOTE - OPERATION/FINDINGS
Chemoport placed in right chest wall with catheter tunneled into right IJ vein. Confirmed placement with fluoroscopy.

## 2017-11-22 NOTE — CHART NOTE - NSCHARTNOTEFT_GEN_A_CORE
Admitting Diagnosis:   Patient is a 77y old  Male who presents with a chief complaint of abdominal pain/jaundice (15 Nov 2017 15:41). 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 S/p chemo port placement 11/22.     PAST MEDICAL & SURGICAL HISTORY:  BPH (benign prostatic hyperplasia)  HLD (hyperlipidemia)  HTN (hypertension)  History of appendectomy      Current Nutrition Order: Clears - of note, pt now NPO/clears x 8 days. RD verbally discussed importance of starting nutrition w/in 24hrs w/ team.     PO Intake: Good (%) [ x  ]  Fair (50-75%) [   ] Poor (<25%) [   ] of clears     GI Issues: Pt notes early satiety w/o N/V/D/C    Pain: controlled per pt    Skin Integrity: intact     Labs:   11-22    131<L>  |  94<L>  |  15  ----------------------------<  111<H>  3.6   |  27  |  1.40<H>    Ca    7.6<L>      22 Nov 2017 07:01  Phos  2.4     11-22  Mg     2.1     11-22    TPro  5.1<L>  /  Alb  2.1<L>  /  TBili  4.0<H>  /  DBili  x   /  AST  82<H>  /  ALT  86<H>  /  AlkPhos  624<H>  11-22      POCT Blood Glucose.: 120 mg/dL (22 Nov 2017 08:46)      Medications:  MEDICATIONS  (STANDING):  atorvastatin 10 milliGRAM(s) Oral at bedtime  diltiazem    milliGRAM(s) Oral daily  heparin  Injectable 5000 Unit(s) SubCutaneous every 8 hours  hydrochlorothiazide 12.5 milliGRAM(s) Oral daily  montelukast 10 milliGRAM(s) Oral at bedtime  multivitamin 1 Tablet(s) Oral daily  pantoprazole    Tablet 40 milliGRAM(s) Oral before breakfast  piperacillin/tazobactam IVPB. 3.375 Gram(s) IV Intermittent every 6 hours  sodium chloride 0.9%. 1000 milliLiter(s) (120 mL/Hr) IV Continuous <Continuous>  sodium ferric gluconate complex IVPB 25 milliGRAM(s) IV Intermittent once  sodium ferric gluconate complex IVPB 100 milliGRAM(s) IV Intermittent once  tamsulosin 0.4 milliGRAM(s) Oral at bedtime    MEDICATIONS  (PRN):  HYDROmorphone  Injectable 0.5 milliGRAM(s) IV Push every 4 hours PRN Severe Pain (7 - 10)  ondansetron Injectable 4 milliGRAM(s) IV Push every 6 hours PRN Nausea      Weight: 85.7 (11/22)  Daily Height in cm: 182.88 (22 Nov 2017 06:41)      Weight Change: up 5kg from admission, possible fluid changes.     Estimated energy needs:   2107-2592kcal (25-30kcal/kg)  101-118g pro (1.2-1.4g pro/kg)  2529-2950mL (30-35mL/kg)    Subjective: Pt s/p chemo port placement. RD noted 100% completion of clear liquid lunch today. However, pt notes early satiety. RD discussed importance of consuming Ensure clear for additional protein. RD discussed w/ team the need to start nutrition w/in 24hrs. Recommended: low fat w/ Ensure Clear TID, Prostat BID (supplementation = 920kcal, 54g pro). Rec fat restriction 2/2 cholangiocarcinoma to avoid potential pain w/ digestion of fat.     Previous Nutrition Diagnosis:  Inadequate kcal/pro intake RT inability to meet needs 2/2 Clear liquid diet, AEB estimated ~25% needs met via clear liquid diet    Active [   x]  Resolved [   ]    If resolved, new PES:     Goal: Initiate nutrition w/in 24hrs.     Recommendations:  - Low fat diet  - Ensure Clear TID  - Prostat BID    Education: RD provided pt w/ verbal edu regarding meeting needs via PO via diet advance and supplement intake. Pt verbalized understanding.     Risk Level: High [ x  ] Moderate [   ] Low [   ]

## 2017-11-22 NOTE — PROGRESS NOTE ADULT - SUBJECTIVE AND OBJECTIVE BOX
Pt seen and examined at bedside  No new c/o   Feels ok today   Denies any n/v/d, abdominal pain, distention, or recurrent bleeding    MEDICATIONS:  MEDICATIONS  (STANDING):  atorvastatin 10 milliGRAM(s) Oral at bedtime  diltiazem    milliGRAM(s) Oral daily  heparin  Injectable 5000 Unit(s) SubCutaneous every 8 hours  hydrochlorothiazide 12.5 milliGRAM(s) Oral daily  montelukast 10 milliGRAM(s) Oral at bedtime  multivitamin 1 Tablet(s) Oral daily  pantoprazole    Tablet 40 milliGRAM(s) Oral before breakfast  piperacillin/tazobactam IVPB. 3.375 Gram(s) IV Intermittent every 6 hours  sodium chloride 0.9%. 1000 milliLiter(s) (120 mL/Hr) IV Continuous <Continuous>  sodium ferric gluconate complex IVPB 100 milliGRAM(s) IV Intermittent once  tamsulosin 0.4 milliGRAM(s) Oral at bedtime    MEDICATIONS  (PRN):  HYDROmorphone  Injectable 0.5 milliGRAM(s) IV Push every 4 hours PRN Severe Pain (7 - 10)  ondansetron Injectable 4 milliGRAM(s) IV Push every 6 hours PRN Nausea      Allergies  erythromycin (Other)    Intolerances      Vital Signs Last 24 Hrs  T(C): 37.2 (22 Nov 2017 14:15), Max: 37.4 (21 Nov 2017 19:22)  T(F): 98.9 (22 Nov 2017 14:15), Max: 99.4 (21 Nov 2017 19:22)  HR: 81 (22 Nov 2017 14:15) (74 - 92)  BP: 125/73 (22 Nov 2017 14:15) (100/62 - 162/77)  BP(mean): 85 (22 Nov 2017 11:35) (78 - 87)  RR: 18 (22 Nov 2017 14:15) (12 - 18)  SpO2: 94% (22 Nov 2017 14:15) (94% - 97%)    11-21 @ 07:01  -  11-22 @ 07:00  --------------------------------------------------------  IN: 1435 mL / OUT: 1351 mL / NET: 84 mL    11-22 @ 07:01  -  11-22 @ 15:47  --------------------------------------------------------  IN: 1410 mL / OUT: 350 mL / NET: 1060 mL      PHYSICAL EXAM:  General: Well developed; well nourished; in no acute distress  HEENT: MMM, conjunctiva and sclera clear  Gastrointestinal: Soft non-tender non-distended; Normal bowel sounds; No hepatosplenomegaly  Skin: Warm and dry. No obvious rash      LABS:  CBC Full  -  ( 22 Nov 2017 07:01 )  WBC Count : 13.5 K/uL  Hemoglobin : 7.6 g/dL  Hematocrit : 23.1 %  Platelet Count - Automated : 329 K/uL  Mean Cell Volume : 89.5 fL  Mean Cell Hemoglobin : 29.5 pg  Mean Cell Hemoglobin Concentration : 32.9 g/dL    131<L>  |  94<L>  |  15  ----------------------------<  111<H>  3.6   |  27  |  1.40<H>    Ca    7.6<L>      22 Nov 2017 07:01  Phos  2.4     11-22  Mg     2.1     11-22    TPro  5.1<L>  /  Alb  2.1<L>  /  TBili  4.0<H>  /  DBili  x   /  AST  82<H>  /  ALT  86<H>  /  AlkPhos  624<H>  11-22      RADIOLOGY & ADDITIONAL STUDIES (The following images were personally reviewed):

## 2017-11-23 LAB
ALBUMIN SERPL ELPH-MCNC: 2.3 G/DL — LOW (ref 3.3–5)
ALP SERPL-CCNC: 663 U/L — HIGH (ref 40–120)
ALT FLD-CCNC: 81 U/L — HIGH (ref 10–45)
ANION GAP SERPL CALC-SCNC: 11 MMOL/L — SIGNIFICANT CHANGE UP (ref 5–17)
AST SERPL-CCNC: 89 U/L — HIGH (ref 10–40)
BILIRUB SERPL-MCNC: 3.9 MG/DL — HIGH (ref 0.2–1.2)
BUN SERPL-MCNC: 14 MG/DL — SIGNIFICANT CHANGE UP (ref 7–23)
CALCIUM SERPL-MCNC: 7.7 MG/DL — LOW (ref 8.4–10.5)
CHLORIDE SERPL-SCNC: 97 MMOL/L — SIGNIFICANT CHANGE UP (ref 96–108)
CO2 SERPL-SCNC: 27 MMOL/L — SIGNIFICANT CHANGE UP (ref 22–31)
CREAT SERPL-MCNC: 1.38 MG/DL — HIGH (ref 0.5–1.3)
GLUCOSE SERPL-MCNC: 115 MG/DL — HIGH (ref 70–99)
HCT VFR BLD CALC: 22.5 % — LOW (ref 39–50)
HGB BLD-MCNC: 7.4 G/DL — LOW (ref 13–17)
MAGNESIUM SERPL-MCNC: 2.2 MG/DL — SIGNIFICANT CHANGE UP (ref 1.6–2.6)
MCHC RBC-ENTMCNC: 29.7 PG — SIGNIFICANT CHANGE UP (ref 27–34)
MCHC RBC-ENTMCNC: 32.9 G/DL — SIGNIFICANT CHANGE UP (ref 32–36)
MCV RBC AUTO: 90.4 FL — SIGNIFICANT CHANGE UP (ref 80–100)
PHOSPHATE SERPL-MCNC: 2.4 MG/DL — LOW (ref 2.5–4.5)
PLATELET # BLD AUTO: 350 K/UL — SIGNIFICANT CHANGE UP (ref 150–400)
POTASSIUM SERPL-MCNC: 3.7 MMOL/L — SIGNIFICANT CHANGE UP (ref 3.5–5.3)
POTASSIUM SERPL-SCNC: 3.7 MMOL/L — SIGNIFICANT CHANGE UP (ref 3.5–5.3)
PROT SERPL-MCNC: 5.1 G/DL — LOW (ref 6–8.3)
RBC # BLD: 2.49 M/UL — LOW (ref 4.2–5.8)
RBC # FLD: 17.7 % — HIGH (ref 10.3–16.9)
SODIUM SERPL-SCNC: 135 MMOL/L — SIGNIFICANT CHANGE UP (ref 135–145)
WBC # BLD: 13.2 K/UL — HIGH (ref 3.8–10.5)
WBC # FLD AUTO: 13.2 K/UL — HIGH (ref 3.8–10.5)

## 2017-11-23 RX ORDER — FLUTICASONE PROPIONATE 50 MCG
1 SPRAY, SUSPENSION NASAL AT BEDTIME
Qty: 0 | Refills: 0 | Status: DISCONTINUED | OUTPATIENT
Start: 2017-11-23 | End: 2017-12-05

## 2017-11-23 RX ORDER — POTASSIUM PHOSPHATE, MONOBASIC POTASSIUM PHOSPHATE, DIBASIC 236; 224 MG/ML; MG/ML
15 INJECTION, SOLUTION INTRAVENOUS ONCE
Qty: 0 | Refills: 0 | Status: COMPLETED | OUTPATIENT
Start: 2017-11-23 | End: 2017-11-23

## 2017-11-23 RX ADMIN — SODIUM CHLORIDE 120 MILLILITER(S): 9 INJECTION INTRAMUSCULAR; INTRAVENOUS; SUBCUTANEOUS at 05:51

## 2017-11-23 RX ADMIN — POTASSIUM PHOSPHATE, MONOBASIC POTASSIUM PHOSPHATE, DIBASIC 62.5 MILLIMOLE(S): 236; 224 INJECTION, SOLUTION INTRAVENOUS at 10:44

## 2017-11-23 RX ADMIN — HEPARIN SODIUM 5000 UNIT(S): 5000 INJECTION INTRAVENOUS; SUBCUTANEOUS at 05:51

## 2017-11-23 RX ADMIN — Medication 180 MILLIGRAM(S): at 05:51

## 2017-11-23 RX ADMIN — PANTOPRAZOLE SODIUM 40 MILLIGRAM(S): 20 TABLET, DELAYED RELEASE ORAL at 05:51

## 2017-11-23 RX ADMIN — HEPARIN SODIUM 5000 UNIT(S): 5000 INJECTION INTRAVENOUS; SUBCUTANEOUS at 21:36

## 2017-11-23 RX ADMIN — Medication 1 SPRAY(S): at 23:51

## 2017-11-23 RX ADMIN — BENZOCAINE AND MENTHOL 1 LOZENGE: 5; 1 LIQUID ORAL at 21:35

## 2017-11-23 RX ADMIN — HEPARIN SODIUM 5000 UNIT(S): 5000 INJECTION INTRAVENOUS; SUBCUTANEOUS at 14:32

## 2017-11-23 RX ADMIN — Medication 325 MILLIGRAM(S): at 12:41

## 2017-11-23 RX ADMIN — Medication 1 TABLET(S): at 12:41

## 2017-11-23 RX ADMIN — TAMSULOSIN HYDROCHLORIDE 0.4 MILLIGRAM(S): 0.4 CAPSULE ORAL at 21:36

## 2017-11-23 RX ADMIN — Medication 12.5 MILLIGRAM(S): at 05:51

## 2017-11-23 RX ADMIN — ATORVASTATIN CALCIUM 10 MILLIGRAM(S): 80 TABLET, FILM COATED ORAL at 21:35

## 2017-11-23 RX ADMIN — MONTELUKAST 10 MILLIGRAM(S): 4 TABLET, CHEWABLE ORAL at 21:36

## 2017-11-23 NOTE — PROGRESS NOTE ADULT - ASSESSMENT
77yr old M with PMHx significant for GERD, HTN, Dyslipidemia, cardiac arrhythmia, Allergic rhinitis, BPH, who was sent to the ED by his outpatient doctors for worrisome findings on CT.    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  - sp MRCP with CHD and CBD strictures from cholangiocarcinoma with intraductal extension of tumor  - sp ERCP 11/16/17 - with sphincterotomy and placement of 2 uncovered metal stents into R and L hepatic ducts  - F/u liver biopsy  - Monitor daily LFTs  - Consider onc and palliative consult    # Anemia 2/2 likely LGIB  - sp colonoscopy 11/21/17 with evidence of old blood, but no active bleeding identified, his terminal ileum was intubated and this showed some old blood which could be due to small bowel bleed vs backwash from the colon  - No further e/o GI bleeding (i.e melena, hematochezia, or hematemesis)  - transfuse to Hgb >7  - monitor H/H  - C/w PPI daily  - s/p VCE, will upload images in AM when the endoscopy unit is re-opened    GI following

## 2017-11-23 NOTE — PROGRESS NOTE ADULT - SUBJECTIVE AND OBJECTIVE BOX
Pt seen and examined at bedside. ANKIT overnight. Reports weakness and abdominal pain with palpation, but denies further episodes of melena or hematochezia, CP, SOB.    Allergies    erythromycin (Other)    Intolerances      MEDICATIONS:  MEDICATIONS  (STANDING):  atorvastatin 10 milliGRAM(s) Oral at bedtime  diltiazem    milliGRAM(s) Oral daily  ferrous    sulfate 325 milliGRAM(s) Oral daily  heparin  Injectable 5000 Unit(s) SubCutaneous every 8 hours  hydrochlorothiazide 12.5 milliGRAM(s) Oral daily  montelukast 10 milliGRAM(s) Oral at bedtime  multivitamin 1 Tablet(s) Oral daily  pantoprazole    Tablet 40 milliGRAM(s) Oral before breakfast  sodium chloride 0.9%. 1000 milliLiter(s) (120 mL/Hr) IV Continuous <Continuous>  tamsulosin 0.4 milliGRAM(s) Oral at bedtime    MEDICATIONS  (PRN):  benzocaine 15 mG/menthol 3.6 mG Lozenge 1 Lozenge Oral four times a day PRN Sore Throat  HYDROmorphone  Injectable 0.5 milliGRAM(s) IV Push every 4 hours PRN Severe Pain (7 - 10)  ondansetron Injectable 4 milliGRAM(s) IV Push every 6 hours PRN Nausea    Vital Signs Last 24 Hrs  T(C): 37.1 (23 Nov 2017 08:22), Max: 37.7 (22 Nov 2017 20:35)  T(F): 98.7 (23 Nov 2017 08:22), Max: 99.8 (22 Nov 2017 20:35)  HR: 87 (23 Nov 2017 08:22) (81 - 92)  BP: 127/73 (23 Nov 2017 08:22) (125/73 - 138/79)  BP(mean): --  RR: 17 (23 Nov 2017 08:22) (17 - 18)  SpO2: 97% (23 Nov 2017 08:22) (94% - 97%)    11-22 @ 07:01  -  11-23 @ 07:00  --------------------------------------------------------  IN: 3510 mL / OUT: 1550 mL / NET: 1960 mL    11-23 @ 07:01  -  11-23 @ 13:36  --------------------------------------------------------  IN: 970 mL / OUT: 370 mL / NET: 600 mL    PHYSICAL EXAM:    General: Well developed; well nourished; in no acute distress  HEENT: MMM, conjunctiva and sclera clear  Gastrointestinal: Soft obese diffuse TTP non-distended; No rebound or guarding  Skin: Warm and dry. No obvious rash    LABS:                        7.4    13.2  )-----------( 350      ( 23 Nov 2017 08:01 )             22.5     11-23    135  |  97  |  14  ----------------------------<  115<H>  3.7   |  27  |  1.38<H>    Ca    7.7<L>      23 Nov 2017 08:01  Phos  2.4     11-23  Mg     2.2     11-23    TPro  5.1<L>  /  Alb  2.3<L>  /  TBili  3.9<H>  /  DBili  x   /  AST  89<H>  /  ALT  81<H>  /  AlkPhos  663<H>  11-23    RADIOLOGY & ADDITIONAL STUDIES: No new radiologic studies

## 2017-11-23 NOTE — PROGRESS NOTE ADULT - ASSESSMENT
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  - Carcinoma encasing and obstruction cystic duct and common hepatic duct, with intrahepatic ductal dilation.  - ERCP w/ stent placement  - IR liver bx results pending  - Possible palliative consult    2) LGIB  - Post ERCP  - Bowel prep 11/19.  - Colonoscopy 11/21  - pRBC x1 (11/21)    3) R. Pleural effusion  - Per Dr. Hare pleural fluid is not large enough to drain    4) HTN  - diltiazem, HCTZ    5) BPH  - tamsulosin    6) HLD  - Atorvastatin 10 mg    7) Dispo: ? 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  - Carcinoma encasing and obstruction cystic duct and common hepatic duct, with intrahepatic ductal dilation.  - ERCP w/ stent placement  - IR liver bx results pending  - Possible palliative consult  - Advance to regular diet with ensure enlive 1 bottle, 3x a day, prostat can, 1 can 2 times a day    2) LGIB  - Post ERCP  - Bowel prep 11/19.  - Colonoscopy 11/21  - pRBC x1 (11/21)    3) R. Pleural effusion  - Per Dr. Hare pleural fluid is not large enough to drain    4) HTN  - diltiazem, HCTZ    5) BPH  - tamsulosin    6) HLD  - Atorvastatin 10 mg    7) Dispo: Need PT recs

## 2017-11-23 NOTE — PROGRESS NOTE ADULT - SUBJECTIVE AND OBJECTIVE BOX
ON: ANKIT. VSS  11/22: capsule endoscopy today. ON: [facility]  11/22: capsule endoscopy today.      SUBJECTIVE: Patient seen and examined bedside by chief resident. No nausea/vomiting, feeling weak. no abd pain. No bloody bowel movement    diltiazem    milliGRAM(s) Oral daily  heparin  Injectable 5000 Unit(s) SubCutaneous every 8 hours  hydrochlorothiazide 12.5 milliGRAM(s) Oral daily  tamsulosin 0.4 milliGRAM(s) Oral at bedtime      Vital Signs Last 24 Hrs  T(C): 37.1 (23 Nov 2017 08:22), Max: 37.7 (22 Nov 2017 20:35)  T(F): 98.7 (23 Nov 2017 08:22), Max: 99.8 (22 Nov 2017 20:35)  HR: 87 (23 Nov 2017 08:22) (74 - 92)  BP: 127/73 (23 Nov 2017 08:22) (100/62 - 138/79)  BP(mean): 85 (22 Nov 2017 11:35) (78 - 87)  RR: 17 (23 Nov 2017 08:22) (12 - 18)  SpO2: 97% (23 Nov 2017 08:22) (94% - 97%)  I&O's Detail    22 Nov 2017 07:01  -  23 Nov 2017 07:00  --------------------------------------------------------  IN:    Oral Fluid: 440 mL    sodium chloride 0.9%.: 2520 mL    Solution: 100 mL    Solution: 250 mL    Solution: 200 mL  Total IN: 3510 mL    OUT:    Voided: 1550 mL  Total OUT: 1550 mL    Total NET: 1960 mL      23 Nov 2017 07:01  -  23 Nov 2017 08:43  --------------------------------------------------------  IN:  Total IN: 0 mL    OUT:    Stool: 70 mL    Voided: 300 mL  Total OUT: 370 mL    Total NET: -370 mL          General: NAD, resting comfortably in bed  C/V: NSR  Pulm: Nonlabored breathing, no respiratory distress  Abd: soft, NT/ND.  Extrem: WWP, no edema, SCDs in place        LABS:                        7.4    13.2  )-----------( 350      ( 23 Nov 2017 08:01 )             22.5     11-23    135  |  97  |  14  ----------------------------<  115<H>  3.7   |  27  |  1.38<H>    Ca    7.7<L>      23 Nov 2017 08:01  Phos  2.4     11-23  Mg     2.2     11-23    TPro  5.1<L>  /  Alb  2.3<L>  /  TBili  3.9<H>  /  DBili  x   /  AST  89<H>  /  ALT  81<H>  /  AlkPhos  663<H>  11-23          RADIOLOGY & ADDITIONAL STUDIES:

## 2017-11-24 DIAGNOSIS — J98.11 ATELECTASIS: ICD-10-CM

## 2017-11-24 DIAGNOSIS — R06.09 OTHER FORMS OF DYSPNEA: ICD-10-CM

## 2017-11-24 LAB
ALBUMIN SERPL ELPH-MCNC: 2.3 G/DL — LOW (ref 3.3–5)
ALP SERPL-CCNC: 846 U/L — HIGH (ref 40–120)
ALT FLD-CCNC: 128 U/L — HIGH (ref 10–45)
ANION GAP SERPL CALC-SCNC: 11 MMOL/L — SIGNIFICANT CHANGE UP (ref 5–17)
APTT BLD: 43.8 SEC — HIGH (ref 27.5–37.4)
AST SERPL-CCNC: 182 U/L — HIGH (ref 10–40)
BILIRUB SERPL-MCNC: 4.1 MG/DL — HIGH (ref 0.2–1.2)
BUN SERPL-MCNC: 14 MG/DL — SIGNIFICANT CHANGE UP (ref 7–23)
CALCIUM SERPL-MCNC: 7.9 MG/DL — LOW (ref 8.4–10.5)
CHLORIDE SERPL-SCNC: 96 MMOL/L — SIGNIFICANT CHANGE UP (ref 96–108)
CO2 SERPL-SCNC: 27 MMOL/L — SIGNIFICANT CHANGE UP (ref 22–31)
CREAT SERPL-MCNC: 1.17 MG/DL — SIGNIFICANT CHANGE UP (ref 0.5–1.3)
GLUCOSE FLD-MCNC: 156 MG/DL — SIGNIFICANT CHANGE UP
GLUCOSE SERPL-MCNC: 136 MG/DL — HIGH (ref 70–99)
HCT VFR BLD CALC: 21 % — CRITICAL LOW (ref 39–50)
HGB BLD-MCNC: 7.1 G/DL — LOW (ref 13–17)
INR BLD: 1.2 — HIGH (ref 0.88–1.16)
LDH SERPL L TO P-CCNC: 229 U/L — SIGNIFICANT CHANGE UP
MAGNESIUM SERPL-MCNC: 2.2 MG/DL — SIGNIFICANT CHANGE UP (ref 1.6–2.6)
MCHC RBC-ENTMCNC: 30.6 PG — SIGNIFICANT CHANGE UP (ref 27–34)
MCHC RBC-ENTMCNC: 33.8 G/DL — SIGNIFICANT CHANGE UP (ref 32–36)
MCV RBC AUTO: 90.5 FL — SIGNIFICANT CHANGE UP (ref 80–100)
PH FLD: >7.8 — SIGNIFICANT CHANGE UP
PHOSPHATE SERPL-MCNC: 2 MG/DL — LOW (ref 2.5–4.5)
PLATELET # BLD AUTO: 356 K/UL — SIGNIFICANT CHANGE UP (ref 150–400)
POTASSIUM SERPL-MCNC: 3.3 MMOL/L — LOW (ref 3.5–5.3)
POTASSIUM SERPL-SCNC: 3.3 MMOL/L — LOW (ref 3.5–5.3)
PROT SERPL-MCNC: 5.4 G/DL — LOW (ref 6–8.3)
PROTHROM AB SERPL-ACNC: 13.4 SEC — HIGH (ref 9.8–12.7)
RBC # BLD: 2.32 M/UL — LOW (ref 4.2–5.8)
RBC # FLD: 18.3 % — HIGH (ref 10.3–16.9)
SODIUM SERPL-SCNC: 134 MMOL/L — LOW (ref 135–145)
SPECIMEN SOURCE FLD: SIGNIFICANT CHANGE UP
WBC # BLD: 15 K/UL — HIGH (ref 3.8–10.5)
WBC # FLD AUTO: 15 K/UL — HIGH (ref 3.8–10.5)

## 2017-11-24 PROCEDURE — 32551 INSERTION OF CHEST TUBE: CPT | Mod: GC

## 2017-11-24 PROCEDURE — 99233 SBSQ HOSP IP/OBS HIGH 50: CPT | Mod: 25,GC

## 2017-11-24 PROCEDURE — 71010: CPT | Mod: 26

## 2017-11-24 PROCEDURE — 71010: CPT | Mod: 26,77

## 2017-11-24 RX ORDER — SODIUM,POTASSIUM PHOSPHATES 278-250MG
1 POWDER IN PACKET (EA) ORAL ONCE
Qty: 0 | Refills: 0 | Status: COMPLETED | OUTPATIENT
Start: 2017-11-24 | End: 2017-11-24

## 2017-11-24 RX ORDER — IPRATROPIUM/ALBUTEROL SULFATE 18-103MCG
3 AEROSOL WITH ADAPTER (GRAM) INHALATION EVERY 6 HOURS
Qty: 0 | Refills: 0 | Status: DISCONTINUED | OUTPATIENT
Start: 2017-11-24 | End: 2017-11-25

## 2017-11-24 RX ORDER — FUROSEMIDE 40 MG
20 TABLET ORAL ONCE
Qty: 0 | Refills: 0 | Status: COMPLETED | OUTPATIENT
Start: 2017-11-24 | End: 2017-11-24

## 2017-11-24 RX ORDER — DIPHENHYDRAMINE HCL 50 MG
25 CAPSULE ORAL ONCE
Qty: 0 | Refills: 0 | Status: DISCONTINUED | OUTPATIENT
Start: 2017-11-24 | End: 2017-12-05

## 2017-11-24 RX ORDER — SODIUM,POTASSIUM PHOSPHATES 278-250MG
1 POWDER IN PACKET (EA) ORAL
Qty: 0 | Refills: 0 | Status: DISCONTINUED | OUTPATIENT
Start: 2017-11-24 | End: 2017-11-24

## 2017-11-24 RX ORDER — POTASSIUM CHLORIDE 20 MEQ
40 PACKET (EA) ORAL ONCE
Qty: 0 | Refills: 0 | Status: COMPLETED | OUTPATIENT
Start: 2017-11-24 | End: 2017-11-24

## 2017-11-24 RX ORDER — POTASSIUM PHOSPHATE, MONOBASIC POTASSIUM PHOSPHATE, DIBASIC 236; 224 MG/ML; MG/ML
15 INJECTION, SOLUTION INTRAVENOUS ONCE
Qty: 0 | Refills: 0 | Status: COMPLETED | OUTPATIENT
Start: 2017-11-24 | End: 2017-11-24

## 2017-11-24 RX ORDER — SODIUM,POTASSIUM PHOSPHATES 278-250MG
1 POWDER IN PACKET (EA) ORAL ONCE
Qty: 0 | Refills: 0 | Status: DISCONTINUED | OUTPATIENT
Start: 2017-11-24 | End: 2017-11-24

## 2017-11-24 RX ORDER — SODIUM CHLORIDE 9 MG/ML
1000 INJECTION INTRAMUSCULAR; INTRAVENOUS; SUBCUTANEOUS ONCE
Qty: 0 | Refills: 0 | Status: COMPLETED | OUTPATIENT
Start: 2017-11-24 | End: 2017-11-24

## 2017-11-24 RX ADMIN — PANTOPRAZOLE SODIUM 40 MILLIGRAM(S): 20 TABLET, DELAYED RELEASE ORAL at 07:01

## 2017-11-24 RX ADMIN — Medication 3 MILLILITER(S): at 12:00

## 2017-11-24 RX ADMIN — Medication 325 MILLIGRAM(S): at 11:58

## 2017-11-24 RX ADMIN — HEPARIN SODIUM 5000 UNIT(S): 5000 INJECTION INTRAVENOUS; SUBCUTANEOUS at 13:28

## 2017-11-24 RX ADMIN — Medication 20 MILLIGRAM(S): at 17:38

## 2017-11-24 RX ADMIN — Medication 3 MILLILITER(S): at 01:54

## 2017-11-24 RX ADMIN — HEPARIN SODIUM 5000 UNIT(S): 5000 INJECTION INTRAVENOUS; SUBCUTANEOUS at 05:27

## 2017-11-24 RX ADMIN — Medication 180 MILLIGRAM(S): at 05:27

## 2017-11-24 RX ADMIN — Medication 20 MILLIGRAM(S): at 11:58

## 2017-11-24 RX ADMIN — Medication 40 MILLIEQUIVALENT(S): at 12:00

## 2017-11-24 RX ADMIN — MONTELUKAST 10 MILLIGRAM(S): 4 TABLET, CHEWABLE ORAL at 22:01

## 2017-11-24 RX ADMIN — Medication 1 TABLET(S): at 17:38

## 2017-11-24 RX ADMIN — ATORVASTATIN CALCIUM 10 MILLIGRAM(S): 80 TABLET, FILM COATED ORAL at 22:01

## 2017-11-24 RX ADMIN — Medication 3 MILLILITER(S): at 17:38

## 2017-11-24 RX ADMIN — Medication 1 TABLET(S): at 12:00

## 2017-11-24 RX ADMIN — HEPARIN SODIUM 5000 UNIT(S): 5000 INJECTION INTRAVENOUS; SUBCUTANEOUS at 22:01

## 2017-11-24 RX ADMIN — Medication 12.5 MILLIGRAM(S): at 05:27

## 2017-11-24 RX ADMIN — SODIUM CHLORIDE 3603.6 MILLILITER(S): 9 INJECTION INTRAMUSCULAR; INTRAVENOUS; SUBCUTANEOUS at 23:15

## 2017-11-24 RX ADMIN — TAMSULOSIN HYDROCHLORIDE 0.4 MILLIGRAM(S): 0.4 CAPSULE ORAL at 22:01

## 2017-11-24 RX ADMIN — Medication 3 MILLILITER(S): at 05:26

## 2017-11-24 NOTE — PROVIDER CONTACT NOTE (CRITICAL VALUE NOTIFICATION) - ASSESSMENT
Vital stable. Afebrile. Asymptomatic
Pt is resting comfortably, in no apparent distress. VSS throughout the night.
pt asymptomatic

## 2017-11-24 NOTE — PHYSICAL THERAPY INITIAL EVALUATION ADULT - ADDITIONAL COMMENTS
Pt lives in house, 3 stairs to enter without handrail, side entrance with three stairs to enter and wall to lean on. Pt states he was previously ambulating 20 minutes per day with his wife, no AD, but owns a RW from when his mother used one.

## 2017-11-24 NOTE — PROGRESS NOTE ADULT - SUBJECTIVE AND OBJECTIVE BOX
Pt seen and examined at bedside. Pt c/o SOB overnight and had a repeat CXR which showed worsening right pleural effusion. Pt denies abdominal pain, nausea, vomiting, melena, or hematochezia. Tolerating PO diet.     Allergies    erythromycin (Other)    MEDICATIONS:  MEDICATIONS  (STANDING):  ALBUTerol/ipratropium for Nebulization 3 milliLiter(s) Nebulizer every 6 hours  atorvastatin 10 milliGRAM(s) Oral at bedtime  diltiazem    milliGRAM(s) Oral daily  diphenhydrAMINE   Injectable 25 milliGRAM(s) IV Push once  ferrous    sulfate 325 milliGRAM(s) Oral daily  fluticasone propionate 50 MICROgram(s)/spray Nasal Spray 1 Spray(s) Both Nostrils at bedtime  heparin  Injectable 5000 Unit(s) SubCutaneous every 8 hours  hydrochlorothiazide 12.5 milliGRAM(s) Oral daily  montelukast 10 milliGRAM(s) Oral at bedtime  multivitamin 1 Tablet(s) Oral daily  pantoprazole    Tablet 40 milliGRAM(s) Oral before breakfast  potassium acid phosphate/sodium acid phosphate tablet (K-PHOS No. 2) 1 Tablet(s) Oral four times a day with meals  potassium phosphate / sodium phosphate powder 1 Packet(s) Oral once  tamsulosin 0.4 milliGRAM(s) Oral at bedtime    MEDICATIONS  (PRN):  benzocaine 15 mG/menthol 3.6 mG Lozenge 1 Lozenge Oral four times a day PRN Sore Throat  HYDROmorphone  Injectable 0.5 milliGRAM(s) IV Push every 4 hours PRN Severe Pain (7 - 10)  ondansetron Injectable 4 milliGRAM(s) IV Push every 6 hours PRN Nausea    Vital Signs Last 24 Hrs  T(C): 36.4 (24 Nov 2017 08:30), Max: 37.6 (23 Nov 2017 20:46)  T(F): 97.6 (24 Nov 2017 08:30), Max: 99.6 (23 Nov 2017 20:46)  HR: 94 (24 Nov 2017 11:45) (88 - 94)  BP: 121/69 (24 Nov 2017 11:45) (96/59 - 147/70)  BP(mean): --  RR: 16 (24 Nov 2017 08:30) (16 - 17)  SpO2: 95% (24 Nov 2017 11:45) (94% - 98%)    11-23 @ 07:01  -  11-24 @ 07:00  --------------------------------------------------------  IN: 3370 mL / OUT: 2170 mL / NET: 1200 mL    11-24 @ 07:01  -  11-24 @ 14:20  --------------------------------------------------------  IN: 0 mL / OUT: 600 mL / NET: -600 mL    PHYSICAL EXAM:    General: Ill-appearing; in no acute distress  HEENT: MMM, conjunctiva and sclera clear  Gastrointestinal: Soft obese diffuse TTP non-distended; No rebound or guarding  Skin: Warm and dry. No obvious rash    LABS:                        7.1    15.0  )-----------( 356      ( 24 Nov 2017 07:33 )             21.0     11-24    134<L>  |  96  |  14  ----------------------------<  136<H>  3.3<L>   |  27  |  1.17    Ca    7.9<L>      24 Nov 2017 07:33  Phos  2.0     11-24  Mg     2.2     11-24    TPro  5.4<L>  /  Alb  2.3<L>  /  TBili  4.1<H>  /  DBili  x   /  AST  182<H>  /  ALT  128<H>  /  AlkPhos  846<H>  11-24    PT/INR - ( 24 Nov 2017 10:59 )   PT: 13.4 sec;   INR: 1.20        PTT - ( 24 Nov 2017 10:59 )  PTT:43.8 sec    RADIOLOGY & ADDITIONAL STUDIES:  Xray Chest 1 View AP-PORTABLE IMMEDIATE (11.24.17 @ 03:44)  FINDINGS: Portable view of the chest is compared to 11/18/2017 and   demonstrates interval placement of a right-sided central venous catheter   with the tip at the cavoatrial junction. Normal heart size. Findings of   mild central pulmonary edema. Marked interval enlargement of now large   layering right pleural effusion with right lung atelectasis with minimal   aeration of the residual right upper lobe. Atelectasis within the left   lung base.

## 2017-11-24 NOTE — PROVIDER CONTACT NOTE (CRITICAL VALUE NOTIFICATION) - SITUATION
Pt had bloody BM
Critical lab value reported from lab. Pt's hgb is 6.6 and hct 18.5
Hemoglobin  7.1 hematocrit 21.0

## 2017-11-24 NOTE — PROCEDURE NOTE - PROCEDURE
<<-----Click on this checkbox to enter Procedure Chest tube placement with US guidance  11/24/2017    Active  [id number]

## 2017-11-24 NOTE — PROGRESS NOTE ADULT - ASSESSMENT
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  - Carcinoma encasing and obstruction cystic duct and common hepatic duct, with intrahepatic ductal dilation.  - ERCP w/ stent placement  - IR liver bx results pending  - Possible palliative consult    2) LGIB  - Post ERCP  - Bowel prep 11/19.  - Colonoscopy 11/21  - pRBC x1 (11/21)    3) R. Pleural effusion  - Per Dr. 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  - Carcinoma encasing and obstruction cystic duct and common hepatic duct, with intrahepatic ductal dilation.  - ERCP w/ stent placement  - IR liver bx results pending  - Possible palliative consult    2) LGIB  - Post ERCP  - Bowel prep 11/19.  - Colonoscopy 11/21  - pRBC x1 (11/21)    3) R. Pleural effusion  - IV Lasix 20 mg  - Pulm consulted to see patient again today    4) HTN  - diltiazem, HCTZ    5) BPH  - tamsulosin    6) HLD  - Atorvastatin 10 mg    7) Dispo: awaiting for PT recs

## 2017-11-24 NOTE — PROGRESS NOTE ADULT - PROBLEM SELECTOR PLAN 1
Large pleural effusion on the R as seen on bedside US likely 2/2 malignant effusion.  Given progression of symptoms, pt warrants drainage of pleural effusion. As fluid is likely due to malignancy, expect pleural effusion to recur after single thoracentesis. Chest tube placed at bedside, clamped after 1300cc serous fluid drained, will unclamp every 4 others with goal of no more > 1 L per drainage. Effusion unlikely to resolve until underlying process treated (ie chemotherapy) - if plan is for out pt chemotherapy vs. palliative care, will likely benefit from pleurx catheter placement so pt is able to go home and continue to have drainage. If plan is for in pt chemotherapy, may be able to continue with chest tube drainage  - F/U cytology, cell count, pH, glucose, LHD, bilirubin, and culture Large pleural effusion on the R as seen on bedside US likely 2/2 malignant effusion.  Given progression of symptoms, pt warrants drainage of pleural effusion. As fluid is likely due to malignancy, expect pleural effusion to recur after single thoracentesis. Chest tube placed at bedside, clamped after 1300cc serous fluid drained, will unclamp every 4 others with goal of no more > 1 L per drainage. Effusion unlikely to resolve until underlying process treated (ie chemotherapy) - if plan is for out pt chemotherapy vs. palliative care, will likely benefit from pleurx catheter placement so pt is able to go home and continue to have drainage. If plan is for in pt chemotherapy, may be able to continue with chest tube drainage  - F/U cytology, cell count, pH, glucose, LHD, bilirubin, and culture  the parameters of the pleural fluid is consistent with exudative effusion

## 2017-11-24 NOTE — PROVIDER CONTACT NOTE (CRITICAL VALUE NOTIFICATION) - BACKGROUND
Pt admitted for cholangiocarcinoma met to liver and lungs
Dx cholangiocarcinoma with mets to the liver and right sided pleural effusion
Pt underwent MRCP and ERCP with stents to drain obstructed bile ducts on 11/16.

## 2017-11-24 NOTE — PHYSICAL THERAPY INITIAL EVALUATION ADULT - IMPAIRED TRANSFERS: SIT/STAND, REHAB EVAL
Endurance, reporting feeling dizzy upon sitting and standing, SBP decreased, NICOLLE Green and Team 4 aware./decreased strength/impaired balance

## 2017-11-24 NOTE — PROGRESS NOTE ADULT - SUBJECTIVE AND OBJECTIVE BOX
Re-called by primary team for increased pleural effusion associated with SOB    Interval Events:  Patient seen and examined at bedside. Since last seen by pulmonary, pt developed acute onset SOB while sleeping last night. No chest pain, palpitations, cough, fever/chills, but has noticed significant SOB with even mild exertion and had difficulty working with PT today.         MEDICATIONS:  Pulmonary:  ALBUTerol/ipratropium for Nebulization 3 milliLiter(s) Nebulizer every 6 hours  diphenhydrAMINE   Injectable 25 milliGRAM(s) IV Push once  montelukast 10 milliGRAM(s) Oral at bedtime    Antimicrobials:    Anticoagulants:  heparin  Injectable 5000 Unit(s) SubCutaneous every 8 hours    Cardiac:  diltiazem    milliGRAM(s) Oral daily  furosemide   Injectable 20 milliGRAM(s) IV Push once  hydrochlorothiazide 12.5 milliGRAM(s) Oral daily  tamsulosin 0.4 milliGRAM(s) Oral at bedtime      Allergies    erythromycin (Other)    Intolerances        Vital Signs Last 24 Hrs  T(C): 36.7 (24 Nov 2017 14:22), Max: 37.6 (23 Nov 2017 20:46)  T(F): 98 (24 Nov 2017 14:22), Max: 99.6 (23 Nov 2017 20:46)  HR: 81 (24 Nov 2017 14:22) (81 - 94)  BP: 119/71 (24 Nov 2017 14:22) (96/59 - 147/70)  BP(mean): --  RR: 17 (24 Nov 2017 14:22) (16 - 17)  SpO2: 98% (24 Nov 2017 14:22) (94% - 98%)    11-23 @ 07:01 - 11-24 @ 07:00  --------------------------------------------------------  IN: 3370 mL / OUT: 2170 mL / NET: 1200 mL    11-24 @ 07:01 - 11-24 @ 16:11  --------------------------------------------------------  IN: 240 mL / OUT: 1000 mL / NET: -760 mL          PHYSICAL EXAM:    General: Well developed; in mild respiratory distress when asked to reposition  Eyes: PERRL, EOM intact; scleral icterus much improved  ENMT: No nasal discharge; airway clear  Neck: Supple; non tender; no masses  Respiratory: decreased BS on R up to upper lung fields with dullness to percussion  Cardiovascular: Regular rate and rhythm. S1 and S2 Normal; No murmurs, gallops or rubs  Gastrointestinal: Soft non-tender non-distended; Normal bowel sounds  Extremities:No clubbing, cyanosis or edema  Neurological: Alert and oriented x3  Skin: Warm and dry. No obvious rash    LABS:      CBC Full  -  ( 24 Nov 2017 07:33 )  WBC Count : 15.0 K/uL  Hemoglobin : 7.1 g/dL  Hematocrit : 21.0 %  Platelet Count - Automated : 356 K/uL  Mean Cell Volume : 90.5 fL  Mean Cell Hemoglobin : 30.6 pg  Mean Cell Hemoglobin Concentration : 33.8 g/dL      11-24    134<L>  |  96  |  14  ----------------------------<  136<H>  3.3<L>   |  27  |  1.17    Ca    7.9<L>      24 Nov 2017 07:33  Phos  2.0     11-24  Mg     2.2     11-24    TPro  5.4<L>  /  Alb  2.3<L>  /  TBili  4.1<H>  /  DBili  x   /  AST  182<H>  /  ALT  128<H>  /  AlkPhos  846<H>  11-24    PT/INR - ( 24 Nov 2017 10:59 )   PT: 13.4 sec;   INR: 1.20          PTT - ( 24 Nov 2017 10:59 )  PTT:43.8 sec                  RADIOLOGY & ADDITIONAL STUDIES (The following images were personally reviewed):  < from: Xray Chest 1 View AP-PORTABLE IMMEDIATE (11.24.17 @ 03:44) >  FINDINGS: Portable view of the chest is compared to 11/18/2017 and   demonstrates interval placement of a right-sided central venous catheter   with the tip at the cavoatrial junction. Normal heart size. Findings of   mild central pulmonary edema. Marked interval enlargement of now large   layering right pleural effusion with right lung atelectasis with minimal   aeration of the residual right upper lobe. Atelectasis within the left   lung base.    < end of copied text >

## 2017-11-24 NOTE — PROGRESS NOTE ADULT - ASSESSMENT
77yr old M with PMHx significant for GERD, HTN, Dyslipidemia, cardiac arrhythmia, Allergic rhinitis, BPH, who was sent to the ED by his outpatient doctors for worrisome findings on CT.    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  - sp MRCP with CHD and CBD strictures from cholangiocarcinoma with intraductal extension of tumor  - sp ERCP 11/16/17 - with sphincterotomy and placement of 2 uncovered metal stents into R and L hepatic ducts  - F/u liver biopsy  - Monitor daily LFTs  - Consider onc and palliative consult    # R pleural effusion  - Pulm consult for possible thoracentesis    # Anemia 2/2 likely LGIB  - sp colonoscopy 11/21/17 with evidence of old blood, but no active bleeding identified, his terminal ileum was intubated and this showed some old blood which could be due to small bowel bleed vs backwash from the colon  - No further e/o GI bleeding (i.e melena, hematochezia, or hematemesis)  - transfuse to Hgb >7  - monitor H/H  - C/w PPI daily  - VCE results pending    GI following

## 2017-11-24 NOTE — PHYSICAL THERAPY INITIAL EVALUATION ADULT - PERTINENT HX OF CURRENT PROBLEM, REHAB EVAL
As per chart: 76 yo M with PMH HTN, HLD, arrhythmia, BPH presented to ED with c/o intermittent, diffuse abdominal pain for 1 month and SOB for 3 days. Pt reports 15 lb weight loss in last month and decreased appetite. Pt became jaundiced approximately one week ago and went to see his PCP who sent him for an outpatient CT scan.  Heptatic neoplasm.

## 2017-11-24 NOTE — PHYSICAL THERAPY INITIAL EVALUATION ADULT - DISCHARGE DISPOSITION, PT EVAL
BRISEYDA .vs Home with Home [facility] pending ambulation and stair eval as well as functional progress.

## 2017-11-24 NOTE — PROGRESS NOTE ADULT - SUBJECTIVE AND OBJECTIVE BOX
ON : Passing gas, ANKIT. tolerating with reg diet. c/o wheezing and some SOB, O2 at room air 97%, duoneb given, improved, CXR pleural effusion on the right lung,   11/23 : advance to regular diet with ensure and prostat. PT ordered. no PT on thanksgiving. ON : Passing gas, ANKIT. tolerating with reg diet. c/o wheezing and some SOB, O2 at room air 97%, duoneb given, improved, CXR pleural effusion on the right lung,   11/23 : advance to regular diet with ensure and prostat. PT ordered. no PT on thanksgiving.       SUBJECTIVE: Patient seen and examined bedside by chief resident. Mild desaturating episodes. CXR indicative of increased pleural effusion of right lung. IV lasix given.    diltiazem    milliGRAM(s) Oral daily  furosemide   Injectable 20 milliGRAM(s) IV Push once  heparin  Injectable 5000 Unit(s) SubCutaneous every 8 hours  hydrochlorothiazide 12.5 milliGRAM(s) Oral daily  tamsulosin 0.4 milliGRAM(s) Oral at bedtime      Vital Signs Last 24 Hrs  T(C): 36.4 (24 Nov 2017 08:30), Max: 37.7 (23 Nov 2017 13:44)  T(F): 97.6 (24 Nov 2017 08:30), Max: 99.8 (23 Nov 2017 13:44)  HR: 89 (24 Nov 2017 08:30) (85 - 93)  BP: 130/75 (24 Nov 2017 08:30) (117/72 - 146/82)  BP(mean): --  RR: 16 (24 Nov 2017 08:30) (16 - 17)  SpO2: 96% (24 Nov 2017 08:30) (94% - 98%)  I&O's Detail    23 Nov 2017 07:01  -  24 Nov 2017 07:00  --------------------------------------------------------  IN:    Oral Fluid: 960 mL    sodium chloride 0.9%: 2160 mL    Solution: 250 mL  Total IN: 3370 mL    OUT:    Stool: 70 mL    Voided: 2100 mL  Total OUT: 2170 mL    Total NET: 1200 mL          General: NAD, resting comfortably in bed  C/V: NSR  Pulm: Mild increased work of breathing, no respiratory distress  Abd: soft, NT/ND.  Extrem: WWP, no edema, SCDs in place        LABS:                        7.1    15.0  )-----------( 356      ( 24 Nov 2017 07:33 )             21.0     11-24    134<L>  |  96  |  14  ----------------------------<  136<H>  3.3<L>   |  27  |  1.17    Ca    7.9<L>      24 Nov 2017 07:33  Phos  2.0     11-24  Mg     2.2     11-24    TPro  5.4<L>  /  Alb  2.3<L>  /  TBili  4.1<H>  /  DBili  x   /  AST  182<H>  /  ALT  128<H>  /  AlkPhos  846<H>  11-24          RADIOLOGY & ADDITIONAL STUDIES:

## 2017-11-24 NOTE — PROGRESS NOTE ADULT - ASSESSMENT
76 yo M, PMH of HTN, HPL, presenting with obstructive jaundice  2/2 metastatic cholangiocarcinoma, with progression of pleural effusion now associated with significant symptoms

## 2017-11-25 LAB
ALBUMIN SERPL ELPH-MCNC: 2.1 G/DL — LOW (ref 3.3–5)
ALP SERPL-CCNC: 729 U/L — HIGH (ref 40–120)
ALT FLD-CCNC: 99 U/L — HIGH (ref 10–45)
ANION GAP SERPL CALC-SCNC: 13 MMOL/L — SIGNIFICANT CHANGE UP (ref 5–17)
AST SERPL-CCNC: 108 U/L — HIGH (ref 10–40)
BILIRUB FLD-MCNC: 4.7 MG/DL — SIGNIFICANT CHANGE UP
BILIRUB SERPL-MCNC: 3.3 MG/DL — HIGH (ref 0.2–1.2)
BUN SERPL-MCNC: 16 MG/DL — SIGNIFICANT CHANGE UP (ref 7–23)
CALCIUM SERPL-MCNC: 7.6 MG/DL — LOW (ref 8.4–10.5)
CHLORIDE SERPL-SCNC: 97 MMOL/L — SIGNIFICANT CHANGE UP (ref 96–108)
CHOLEST FLD-MCNC: 62 MG/DL — SIGNIFICANT CHANGE UP
CO2 SERPL-SCNC: 27 MMOL/L — SIGNIFICANT CHANGE UP (ref 22–31)
CREAT SERPL-MCNC: 1.07 MG/DL — SIGNIFICANT CHANGE UP (ref 0.5–1.3)
GLUCOSE SERPL-MCNC: 133 MG/DL — HIGH (ref 70–99)
GRAM STN FLD: SIGNIFICANT CHANGE UP
HCT VFR BLD CALC: 22.6 % — LOW (ref 39–50)
HGB BLD-MCNC: 7.4 G/DL — LOW (ref 13–17)
MAGNESIUM SERPL-MCNC: 2 MG/DL — SIGNIFICANT CHANGE UP (ref 1.6–2.6)
MCHC RBC-ENTMCNC: 30.1 PG — SIGNIFICANT CHANGE UP (ref 27–34)
MCHC RBC-ENTMCNC: 32.7 G/DL — SIGNIFICANT CHANGE UP (ref 32–36)
MCV RBC AUTO: 91.9 FL — SIGNIFICANT CHANGE UP (ref 80–100)
NIGHT BLUE STAIN TISS: SIGNIFICANT CHANGE UP
PHOSPHATE SERPL-MCNC: 1.9 MG/DL — LOW (ref 2.5–4.5)
PLATELET # BLD AUTO: 399 K/UL — SIGNIFICANT CHANGE UP (ref 150–400)
POTASSIUM SERPL-MCNC: 3.2 MMOL/L — LOW (ref 3.5–5.3)
POTASSIUM SERPL-SCNC: 3.2 MMOL/L — LOW (ref 3.5–5.3)
PROT SERPL-MCNC: 5.2 G/DL — LOW (ref 6–8.3)
RBC # BLD: 2.46 M/UL — LOW (ref 4.2–5.8)
RBC # FLD: 19.1 % — HIGH (ref 10.3–16.9)
SODIUM SERPL-SCNC: 137 MMOL/L — SIGNIFICANT CHANGE UP (ref 135–145)
SPECIMEN SOURCE FLD: SIGNIFICANT CHANGE UP
SPECIMEN SOURCE: SIGNIFICANT CHANGE UP
SPECIMEN SOURCE: SIGNIFICANT CHANGE UP
WBC # BLD: 15.8 K/UL — HIGH (ref 3.8–10.5)
WBC # FLD AUTO: 15.8 K/UL — HIGH (ref 3.8–10.5)

## 2017-11-25 PROCEDURE — 99233 SBSQ HOSP IP/OBS HIGH 50: CPT | Mod: GC

## 2017-11-25 PROCEDURE — 99232 SBSQ HOSP IP/OBS MODERATE 35: CPT

## 2017-11-25 PROCEDURE — 74000: CPT | Mod: 26

## 2017-11-25 RX ORDER — POTASSIUM PHOSPHATE, MONOBASIC POTASSIUM PHOSPHATE, DIBASIC 236; 224 MG/ML; MG/ML
30 INJECTION, SOLUTION INTRAVENOUS ONCE
Qty: 0 | Refills: 0 | Status: COMPLETED | OUTPATIENT
Start: 2017-11-25 | End: 2017-11-25

## 2017-11-25 RX ORDER — POTASSIUM CHLORIDE 20 MEQ
40 PACKET (EA) ORAL ONCE
Qty: 0 | Refills: 0 | Status: COMPLETED | OUTPATIENT
Start: 2017-11-25 | End: 2017-11-25

## 2017-11-25 RX ORDER — FUROSEMIDE 40 MG
20 TABLET ORAL ONCE
Qty: 0 | Refills: 0 | Status: COMPLETED | OUTPATIENT
Start: 2017-11-25 | End: 2017-11-25

## 2017-11-25 RX ADMIN — Medication 40 MILLIEQUIVALENT(S): at 11:21

## 2017-11-25 RX ADMIN — HEPARIN SODIUM 5000 UNIT(S): 5000 INJECTION INTRAVENOUS; SUBCUTANEOUS at 21:58

## 2017-11-25 RX ADMIN — Medication 20 MILLIGRAM(S): at 10:30

## 2017-11-25 RX ADMIN — HEPARIN SODIUM 5000 UNIT(S): 5000 INJECTION INTRAVENOUS; SUBCUTANEOUS at 06:01

## 2017-11-25 RX ADMIN — Medication 180 MILLIGRAM(S): at 06:00

## 2017-11-25 RX ADMIN — TAMSULOSIN HYDROCHLORIDE 0.4 MILLIGRAM(S): 0.4 CAPSULE ORAL at 21:54

## 2017-11-25 RX ADMIN — BENZOCAINE AND MENTHOL 1 LOZENGE: 5; 1 LIQUID ORAL at 06:05

## 2017-11-25 RX ADMIN — ATORVASTATIN CALCIUM 10 MILLIGRAM(S): 80 TABLET, FILM COATED ORAL at 21:54

## 2017-11-25 RX ADMIN — HEPARIN SODIUM 5000 UNIT(S): 5000 INJECTION INTRAVENOUS; SUBCUTANEOUS at 14:10

## 2017-11-25 RX ADMIN — Medication 12.5 MILLIGRAM(S): at 06:03

## 2017-11-25 RX ADMIN — Medication 3 MILLILITER(S): at 06:01

## 2017-11-25 RX ADMIN — MONTELUKAST 10 MILLIGRAM(S): 4 TABLET, CHEWABLE ORAL at 21:53

## 2017-11-25 RX ADMIN — Medication 1 TABLET(S): at 12:04

## 2017-11-25 RX ADMIN — Medication 3 MILLILITER(S): at 00:27

## 2017-11-25 RX ADMIN — Medication 325 MILLIGRAM(S): at 12:04

## 2017-11-25 RX ADMIN — PANTOPRAZOLE SODIUM 40 MILLIGRAM(S): 20 TABLET, DELAYED RELEASE ORAL at 07:17

## 2017-11-25 RX ADMIN — POTASSIUM PHOSPHATE, MONOBASIC POTASSIUM PHOSPHATE, DIBASIC 85 MILLIMOLE(S): 236; 224 INJECTION, SOLUTION INTRAVENOUS at 12:05

## 2017-11-25 RX ADMIN — Medication 3 MILLILITER(S): at 12:04

## 2017-11-25 NOTE — PROGRESS NOTE ADULT - ASSESSMENT
78 yo M, PMH of HTN, HPL, presenting with obstructive jaundice  2/2 metastatic cholangiocarcinoma, with progression of pleural effusion now associated with significant symptoms

## 2017-11-25 NOTE — PROGRESS NOTE ADULT - ASSESSMENT
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  - Carcinoma encasing and obstruction cystic duct and common hepatic duct, with intrahepatic ductal dilation.  - ERCP w/ stent placement  - LFTs mildly decreased compared to admission  - Liver bx shows adenocarcinoma compatible w/ metastatic cholangiocarcinoma  - Possible palliative consult    2) LGIB  - Post ERCP  - Bowel prep 11/19.  - Colonoscopy 11/21  - pRBC x1 (11/21)  - No further episodes    3) R. Pleural effusion  - Chest tube placed (11/24). Immediate 1.3 L output  - Patient reports immediate relief  - No further desat episodes    4) HTN  - diltiazem, HCTZ    5) BPH  - tamsulosin    6) HLD  - Atorvastatin 10 mg    7) Dispo: ? 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  - Carcinoma encasing and obstruction cystic duct and common hepatic duct, with intrahepatic ductal dilation.  - ERCP w/ stent placement  - LFTs mildly decreased compared to admission  - Liver bx shows adenocarcinoma compatible w/ metastatic cholangiocarcinoma  - Possible palliative consult    2) LGIB  - Post ERCP  - Bowel prep 11/19.  - Colonoscopy 11/21  - pRBC x1 (11/21)  - No further episodes    3) R. Pleural effusion  - Chest tube placed (11/24). Immediate 1.3 L output      4) HTN  - diltiazem, HCTZ    5) BPH  - tamsulosin    6) HLD  - Atorvastatin 10 mg    7) Dispo: awaiting for PT recs

## 2017-11-25 NOTE — PROGRESS NOTE ADULT - SUBJECTIVE AND OBJECTIVE BOX
Pt seen and examined at bedside. s/p chest tube placement. ANKIT overnight. Pt states that his SOB has improved post chest tube placement. He reports constipation, but is passing flatus, denies abdominal pain, nausea, vomiting, melena, hematochezia, diarrhea. Last BM 2-3 days ago, has not seen capsule.    Allergies  erythromycin (Other)    MEDICATIONS:  MEDICATIONS  (STANDING):  ALBUTerol/ipratropium for Nebulization 3 milliLiter(s) Nebulizer every 6 hours  atorvastatin 10 milliGRAM(s) Oral at bedtime  diltiazem    milliGRAM(s) Oral daily  diphenhydrAMINE   Injectable 25 milliGRAM(s) IV Push once  ferrous    sulfate 325 milliGRAM(s) Oral daily  fluticasone propionate 50 MICROgram(s)/spray Nasal Spray 1 Spray(s) Both Nostrils at bedtime  heparin  Injectable 5000 Unit(s) SubCutaneous every 8 hours  hydrochlorothiazide 12.5 milliGRAM(s) Oral daily  montelukast 10 milliGRAM(s) Oral at bedtime  multivitamin 1 Tablet(s) Oral daily  pantoprazole    Tablet 40 milliGRAM(s) Oral before breakfast  tamsulosin 0.4 milliGRAM(s) Oral at bedtime    MEDICATIONS  (PRN):  benzocaine 15 mG/menthol 3.6 mG Lozenge 1 Lozenge Oral four times a day PRN Sore Throat  HYDROmorphone  Injectable 0.5 milliGRAM(s) IV Push every 4 hours PRN Severe Pain (7 - 10)  ondansetron Injectable 4 milliGRAM(s) IV Push every 6 hours PRN Nausea    Vital Signs Last 24 Hrs  T(C): 36.6 (25 Nov 2017 12:40), Max: 37.7 (24 Nov 2017 20:45)  T(F): 97.9 (25 Nov 2017 12:40), Max: 99.8 (24 Nov 2017 20:45)  HR: 98 (25 Nov 2017 12:40) (90 - 103)  BP: 118/70 (25 Nov 2017 12:40) (115/73 - 138/85)  BP(mean): --  RR: 18 (25 Nov 2017 12:40) (16 - 18)  SpO2: 95% (25 Nov 2017 12:40) (68% - 98%)    11-24 @ 07:01  -  11-25 @ 07:00  --------------------------------------------------------  IN: 240 mL / OUT: 5080 mL / NET: -4840 mL    11-25 @ 07:01  -  11-25 @ 16:28  --------------------------------------------------------  IN: 240 mL / OUT: 700 mL / NET: -460 mL    PHYSICAL EXAM:    General: Ill-appearing; in no acute distress  HEENT: MMM, conjunctiva and sclera clear  Gastrointestinal: Soft obese mild TTP, mildly distended; No rebound or guarding  Skin: Warm and dry. No obvious rash    LABS:                        7.4    15.8  )-----------( 399      ( 25 Nov 2017 06:17 )             22.6     11-25    137  |  97  |  16  ----------------------------<  133<H>  3.2<L>   |  27  |  1.07    Ca    7.6<L>      25 Nov 2017 06:16  Phos  1.9     11-25  Mg     2.0     11-25    TPro  5.2<L>  /  Alb  2.1<L>  /  TBili  3.3<H>  /  DBili  x   /  AST  108<H>  /  ALT  99<H>  /  AlkPhos  729<H>  11-25    PT/INR - ( 24 Nov 2017 10:59 )   PT: 13.4 sec;   INR: 1.20       PTT - ( 24 Nov 2017 10:59 )  PTT:43.8 sec    Culture - Fungal, Body Fluid (collected 25 Nov 2017 01:09)  Source: Pleural Fl pleural fluid  Preliminary Report (25 Nov 2017 13:30):    Testing in progress    Culture - Acid Fast - Body Fluid w/Smear (collected 25 Nov 2017 01:09)  Source: Pleural Fl pleural fluid    Culture - Body Fluid with Gram Stain (collected 24 Nov 2017 17:18)  Source: Pleural Fl pleural fluid  Gram Stain (25 Nov 2017 08:57):    No organisms seen    Few WBC's  Preliminary Report (25 Nov 2017 09:16):    No growth to date.    RADIOLOGY & ADDITIONAL STUDIES:  Xray Chest 1 View AP-PORTABLE IMMEDIATE (11.24.17 @ 03:44)  FINDINGS: Portable view of the chest is compared to 11/18/2017 and   demonstrates interval placement of a right-sided central venous catheter   with the tip at the cavoatrial junction. Normal heart size. Findings of   mild central pulmonary edema. Marked interval enlargement of now large   layering right pleural effusion with right lung atelectasis with minimal   aeration of the residual right upper lobe. Atelectasis within the left   lung base.

## 2017-11-25 NOTE — PROGRESS NOTE ADULT - SUBJECTIVE AND OBJECTIVE BOX
ON: ANKIT  11/24: chest tube placed over right intercostal space - 1300 mL immediate output. Pt reports immediate improvement in symptoms. Decreased WOB, no further desat episodes. ON: ANKIT  11/24: chest tube placed over right intercostal space - 1300 mL immediate output. Pt reports immediate improvement in symptoms. Decreased WOB, no further desat episodes.    STATUS POST:  chest tube insertion day 1     SUBJECTIVE: Patient seen and examined bedside by chief resident. feeling much better, breathing improved, mild abd pain    diltiazem    milliGRAM(s) Oral daily  heparin  Injectable 5000 Unit(s) SubCutaneous every 8 hours  hydrochlorothiazide 12.5 milliGRAM(s) Oral daily  tamsulosin 0.4 milliGRAM(s) Oral at bedtime      Vital Signs Last 24 Hrs  T(C): 37.4 (25 Nov 2017 05:49), Max: 37.7 (24 Nov 2017 20:45)  T(F): 99.3 (25 Nov 2017 05:49), Max: 99.8 (24 Nov 2017 20:45)  HR: 96 (25 Nov 2017 05:49) (81 - 96)  BP: 138/85 (25 Nov 2017 05:49) (96/59 - 147/70)  BP(mean): --  RR: 17 (25 Nov 2017 05:49) (16 - 17)  SpO2: 95% (25 Nov 2017 05:49) (94% - 98%)  I&O's Detail    24 Nov 2017 07:01  -  25 Nov 2017 07:00  --------------------------------------------------------  IN:    Oral Fluid: 240 mL  Total IN: 240 mL    OUT:    Chest Tube: 2630 mL    Voided: 2450 mL  Total OUT: 5080 mL    Total NET: -4840 mL          General: NAD, resting comfortably in bed, jaundice  C/V: NSR  Pulm: Nonlabored breathing, no respiratory distress  Abd: soft, mild tender and distended, chest tube in place, no leak  Extrem: WWP, no edema, SCDs in place        LABS:                        7.4    15.8  )-----------( 399      ( 25 Nov 2017 06:17 )             22.6     11-25    137  |  97  |  16  ----------------------------<  133<H>  3.2<L>   |  27  |  1.07    Ca    7.6<L>      25 Nov 2017 06:16  Phos  1.9     11-25  Mg     2.0     11-25    TPro  5.2<L>  /  Alb  2.1<L>  /  TBili  3.3<H>  /  DBili  x   /  AST  108<H>  /  ALT  99<H>  /  AlkPhos  729<H>  11-25    PT/INR - ( 24 Nov 2017 10:59 )   PT: 13.4 sec;   INR: 1.20          PTT - ( 24 Nov 2017 10:59 )  PTT:43.8 sec      RADIOLOGY & ADDITIONAL STUDIES:

## 2017-11-25 NOTE — PROGRESS NOTE ADULT - SUBJECTIVE AND OBJECTIVE BOX
Interval Events:  Patient seen and examined at bedside. Breathing imprved, feels weak    MEDICATIONS:  Pulmonary:  diphenhydrAMINE   Injectable 25 milliGRAM(s) IV Push once  montelukast 10 milliGRAM(s) Oral at bedtime    Antimicrobials:    Anticoagulants:  heparin  Injectable 5000 Unit(s) SubCutaneous every 8 hours    Cardiac:  diltiazem    milliGRAM(s) Oral daily  hydrochlorothiazide 12.5 milliGRAM(s) Oral daily  tamsulosin 0.4 milliGRAM(s) Oral at bedtime      Allergies    erythromycin (Other)    Intolerances        Vital Signs Last 24 Hrs  T(C): 36.9 (25 Nov 2017 16:07), Max: 37.4 (25 Nov 2017 05:49)  T(F): 98.4 (25 Nov 2017 16:07), Max: 99.3 (25 Nov 2017 05:49)  HR: 89 (25 Nov 2017 16:07) (89 - 103)  BP: 122/72 (25 Nov 2017 16:07) (115/85 - 138/85)  BP(mean): --  RR: 17 (25 Nov 2017 16:07) (16 - 18)  SpO2: 99% (25 Nov 2017 16:07) (68% - 99%)    11-24 @ 07:01  -  11-25 @ 07:00  --------------------------------------------------------  IN: 240 mL / OUT: 5080 mL / NET: -4840 mL    11-25 @ 07:01  -  11-25 @ 22:00  --------------------------------------------------------  IN: 240 mL / OUT: 1450 mL / NET: -1210 mL          PHYSICAL EXAM:  General: Well developed; in no acute distress  HEENT: PERRL, EOMI, scleral icterous  Neck: Supple;   Respiratory: decreased BS on the R, chest tube on R - C/D/I  Cardiovascular: Regular rate and rhythm. S1 and S2 Normal;   Gastrointestinal: Soft non-tender, distended;   Extremities:WWP, +3 LE edema, no cyanosis  Neurological: Alert and oriented x3  Skin: Warm and dry. No obvious rash    LABS:      CBC Full  -  ( 25 Nov 2017 06:17 )  WBC Count : 15.8 K/uL  Hemoglobin : 7.4 g/dL  Hematocrit : 22.6 %  Platelet Count - Automated : 399 K/uL  Mean Cell Volume : 91.9 fL  Mean Cell Hemoglobin : 30.1 pg  Mean Cell Hemoglobin Concentration : 32.7 g/dL  Auto Neutrophil # : x  Auto Lymphocyte # : x  Auto Monocyte # : x  Auto Eosinophil # : x  Auto Basophil # : x  Auto Neutrophil % : x  Auto Lymphocyte % : x  Auto Monocyte % : x  Auto Eosinophil % : x  Auto Basophil % : x    11-25    137  |  97  |  16  ----------------------------<  133<H>  3.2<L>   |  27  |  1.07    Ca    7.6<L>      25 Nov 2017 06:16  Phos  1.9     11-25  Mg     2.0     11-25    TPro  5.2<L>  /  Alb  2.1<L>  /  TBili  3.3<H>  /  DBili  x   /  AST  108<H>  /  ALT  99<H>  /  AlkPhos  729<H>  11-25    PT/INR - ( 24 Nov 2017 10:59 )   PT: 13.4 sec;   INR: 1.20          PTT - ( 24 Nov 2017 10:59 )  PTT:43.8 sec                  RADIOLOGY imaging reviewed Interval Events:  Patient seen and examined at bedside. Breathing improved, feels weak    MEDICATIONS:  Pulmonary:  diphenhydrAMINE   Injectable 25 milliGRAM(s) IV Push once  montelukast 10 milliGRAM(s) Oral at bedtime    Antimicrobials:    Anticoagulants:  heparin  Injectable 5000 Unit(s) SubCutaneous every 8 hours    Cardiac:  diltiazem    milliGRAM(s) Oral daily  hydrochlorothiazide 12.5 milliGRAM(s) Oral daily  tamsulosin 0.4 milliGRAM(s) Oral at bedtime      Allergies    erythromycin (Other)    Intolerances        Vital Signs Last 24 Hrs  T(C): 36.9 (25 Nov 2017 16:07), Max: 37.4 (25 Nov 2017 05:49)  T(F): 98.4 (25 Nov 2017 16:07), Max: 99.3 (25 Nov 2017 05:49)  HR: 89 (25 Nov 2017 16:07) (89 - 103)  BP: 122/72 (25 Nov 2017 16:07) (115/85 - 138/85)  BP(mean): --  RR: 17 (25 Nov 2017 16:07) (16 - 18)  SpO2: 99% (25 Nov 2017 16:07) (68% - 99%)    11-24 @ 07:01  -  11-25 @ 07:00  --------------------------------------------------------  IN: 240 mL / OUT: 5080 mL / NET: -4840 mL    11-25 @ 07:01  -  11-25 @ 22:00  --------------------------------------------------------  IN: 240 mL / OUT: 1450 mL / NET: -1210 mL          PHYSICAL EXAM:  General: Well developed; in no acute distress  HEENT: PERRL, EOMI, scleral icterous  Neck: Supple;   Respiratory: decreased BS on the R, chest tube on R - C/D/I  Cardiovascular: Regular rate and rhythm. S1 and S2 Normal;   Gastrointestinal: Soft non-tender, distended;   Extremities:WWP, +3 LE edema, no cyanosis  Neurological: Alert and oriented x3  Skin: Warm and dry. No obvious rash    LABS:      CBC Full  -  ( 25 Nov 2017 06:17 )  WBC Count : 15.8 K/uL  Hemoglobin : 7.4 g/dL  Hematocrit : 22.6 %  Platelet Count - Automated : 399 K/uL  Mean Cell Volume : 91.9 fL  Mean Cell Hemoglobin : 30.1 pg  Mean Cell Hemoglobin Concentration : 32.7 g/dL  Auto Neutrophil # : x  Auto Lymphocyte # : x  Auto Monocyte # : x  Auto Eosinophil # : x  Auto Basophil # : x  Auto Neutrophil % : x  Auto Lymphocyte % : x  Auto Monocyte % : x  Auto Eosinophil % : x  Auto Basophil % : x    11-25    137  |  97  |  16  ----------------------------<  133<H>  3.2<L>   |  27  |  1.07    Ca    7.6<L>      25 Nov 2017 06:16  Phos  1.9     11-25  Mg     2.0     11-25    TPro  5.2<L>  /  Alb  2.1<L>  /  TBili  3.3<H>  /  DBili  x   /  AST  108<H>  /  ALT  99<H>  /  AlkPhos  729<H>  11-25    PT/INR - ( 24 Nov 2017 10:59 )   PT: 13.4 sec;   INR: 1.20          PTT - ( 24 Nov 2017 10:59 )  PTT:43.8 sec                  RADIOLOGY imaging reviewed

## 2017-11-25 NOTE — PROGRESS NOTE ADULT - PROBLEM SELECTOR PLAN 1
Large pleural effusion on the R as seen on bedside US likely 2/2 malignant effusion.  Given progression of symptoms, chest tube placed yesterday with drainage of 3L of exudative fluid so far and improvement in sx.  Given effusion is likely malignant - plan to convert to PleurX catheter possibly monday.  - f/u cytology and micro from effusion Large pleural effusion on the R as seen on bedside US likely 2/2 malignant effusion.  Given progression of symptoms, chest tube placed yesterday with drainage of 3L so far and improvement in sx.  Given effusion is likely malignant - plan to convert to PleurX catheter possibly monday.  - f/u cytology and micro from effusion

## 2017-11-25 NOTE — PROGRESS NOTE ADULT - ASSESSMENT
77yr old M with PMHx significant for GERD, HTN, Dyslipidemia, cardiac arrhythmia, Allergic rhinitis, BPH, who was sent to the ED by his outpatient doctors for worrisome findings on CT.    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  - sp MRCP with CHD and CBD strictures from cholangiocarcinoma with intraductal extension of tumor  - sp ERCP 11/16/17 - with sphincterotomy and placement of 2 uncovered metal stents into R and L hepatic ducts  - F/u liver biopsy  - Monitor daily LFTs  - Consider onc and palliative consult    # R pleural effusion  - Pulm consult for possible thoracentesis    # Anemia 2/2 likely LGIB  - sp colonoscopy 11/21/17 with evidence of old blood, but no active bleeding identified, his terminal ileum was intubated and this showed some old blood which could be due to small bowel bleed vs backwash from the colon  - No further e/o GI bleeding (i.e melena, hematochezia, or hematemesis)  - VCE preliminary results shows oozing in the distal duodenum. At the end of the study, the capsule did not clear the small bowel.   - Recommend Abd XR to verify capsule location  - transfuse to Hgb >7  - H/H stable; HD stable; monitor H/H  - C/w PPI daily    GI following 77yr old M with PMHx significant for GERD, HTN, Dyslipidemia, cardiac arrhythmia, Allergic rhinitis, BPH, who was sent to the ED by his outpatient doctors for worrisome findings on CT.    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  - sp MRCP with CHD and CBD strictures from cholangiocarcinoma with intraductal extension of tumor  - sp ERCP 11/16/17 - with sphincterotomy and placement of 2 uncovered metal stents into R and L hepatic ducts  - F/u liver biopsy  - Monitor daily LFTs  - Consider onc and palliative consult    # R pleural effusion  - Pulm consult for possible thoracentesis    # Anemia 2/2 likely LGIB  - sp colonoscopy 11/21/17 with evidence of old blood, but no active bleeding identified, his terminal ileum was intubated and this showed some old blood which could be due to small bowel bleed vs backwash from the colon  - No further e/o GI bleeding (i.e melena, hematochezia, or hematemesis)  - VCE preliminary results shows oozing in the distal duodenum. At the end of the study, the capsule did not clear the small bowel.   - Reviewed signs and symptoms of small bowel obstruction with the patient, including nausea, vomiting, abdominal bloating, obstipation. Pt was instructed to notify physician or nurse if these symptoms occur.  - Recommend Abd XR to verify capsule location  - transfuse to Hgb >7  - H/H stable; HD stable; monitor H/H  - C/w PPI daily    GI following

## 2017-11-26 LAB
ALBUMIN SERPL ELPH-MCNC: 1.9 G/DL — LOW (ref 3.3–5)
ALP SERPL-CCNC: 636 U/L — HIGH (ref 40–120)
ALT FLD-CCNC: 82 U/L — HIGH (ref 10–45)
ANION GAP SERPL CALC-SCNC: 11 MMOL/L — SIGNIFICANT CHANGE UP (ref 5–17)
AST SERPL-CCNC: 82 U/L — HIGH (ref 10–40)
BILIRUB SERPL-MCNC: 2.8 MG/DL — HIGH (ref 0.2–1.2)
BUN SERPL-MCNC: 18 MG/DL — SIGNIFICANT CHANGE UP (ref 7–23)
CALCIUM SERPL-MCNC: 8.2 MG/DL — LOW (ref 8.4–10.5)
CHLORIDE SERPL-SCNC: 97 MMOL/L — SIGNIFICANT CHANGE UP (ref 96–108)
CO2 SERPL-SCNC: 29 MMOL/L — SIGNIFICANT CHANGE UP (ref 22–31)
CREAT SERPL-MCNC: 1.16 MG/DL — SIGNIFICANT CHANGE UP (ref 0.5–1.3)
GLUCOSE SERPL-MCNC: 135 MG/DL — HIGH (ref 70–99)
HCT VFR BLD CALC: 23.8 % — LOW (ref 39–50)
HGB BLD-MCNC: 7.6 G/DL — LOW (ref 13–17)
MAGNESIUM SERPL-MCNC: 2.2 MG/DL — SIGNIFICANT CHANGE UP (ref 1.6–2.6)
MCHC RBC-ENTMCNC: 30.2 PG — SIGNIFICANT CHANGE UP (ref 27–34)
MCHC RBC-ENTMCNC: 31.9 G/DL — LOW (ref 32–36)
MCV RBC AUTO: 94.4 FL — SIGNIFICANT CHANGE UP (ref 80–100)
PHOSPHATE SERPL-MCNC: 2.4 MG/DL — LOW (ref 2.5–4.5)
PLATELET # BLD AUTO: 411 K/UL — HIGH (ref 150–400)
POTASSIUM SERPL-MCNC: 4.2 MMOL/L — SIGNIFICANT CHANGE UP (ref 3.5–5.3)
POTASSIUM SERPL-SCNC: 4.2 MMOL/L — SIGNIFICANT CHANGE UP (ref 3.5–5.3)
PROT SERPL-MCNC: 5.2 G/DL — LOW (ref 6–8.3)
RBC # BLD: 2.52 M/UL — LOW (ref 4.2–5.8)
RBC # FLD: 19.5 % — HIGH (ref 10.3–16.9)
SODIUM SERPL-SCNC: 137 MMOL/L — SIGNIFICANT CHANGE UP (ref 135–145)
WBC # BLD: 17.6 K/UL — HIGH (ref 3.8–10.5)
WBC # FLD AUTO: 17.6 K/UL — HIGH (ref 3.8–10.5)

## 2017-11-26 PROCEDURE — 99232 SBSQ HOSP IP/OBS MODERATE 35: CPT | Mod: GC

## 2017-11-26 PROCEDURE — 99232 SBSQ HOSP IP/OBS MODERATE 35: CPT

## 2017-11-26 PROCEDURE — 71010: CPT | Mod: 26

## 2017-11-26 RX ADMIN — PANTOPRAZOLE SODIUM 40 MILLIGRAM(S): 20 TABLET, DELAYED RELEASE ORAL at 08:00

## 2017-11-26 RX ADMIN — Medication 325 MILLIGRAM(S): at 11:53

## 2017-11-26 RX ADMIN — HEPARIN SODIUM 5000 UNIT(S): 5000 INJECTION INTRAVENOUS; SUBCUTANEOUS at 05:25

## 2017-11-26 RX ADMIN — HEPARIN SODIUM 5000 UNIT(S): 5000 INJECTION INTRAVENOUS; SUBCUTANEOUS at 14:51

## 2017-11-26 RX ADMIN — TAMSULOSIN HYDROCHLORIDE 0.4 MILLIGRAM(S): 0.4 CAPSULE ORAL at 22:03

## 2017-11-26 RX ADMIN — ATORVASTATIN CALCIUM 10 MILLIGRAM(S): 80 TABLET, FILM COATED ORAL at 22:03

## 2017-11-26 RX ADMIN — Medication 12.5 MILLIGRAM(S): at 05:25

## 2017-11-26 RX ADMIN — Medication 1 TABLET(S): at 11:53

## 2017-11-26 RX ADMIN — Medication 180 MILLIGRAM(S): at 05:25

## 2017-11-26 RX ADMIN — HEPARIN SODIUM 5000 UNIT(S): 5000 INJECTION INTRAVENOUS; SUBCUTANEOUS at 22:03

## 2017-11-26 RX ADMIN — BENZOCAINE AND MENTHOL 1 LOZENGE: 5; 1 LIQUID ORAL at 15:03

## 2017-11-26 RX ADMIN — BENZOCAINE AND MENTHOL 1 LOZENGE: 5; 1 LIQUID ORAL at 04:19

## 2017-11-26 RX ADMIN — MONTELUKAST 10 MILLIGRAM(S): 4 TABLET, CHEWABLE ORAL at 22:03

## 2017-11-26 NOTE — PROGRESS NOTE ADULT - SUBJECTIVE AND OBJECTIVE BOX
Pt seen and examined at bedside. ANKIT overnight. Has not had a BM, passing flatus. Denies abdominal pain, nausea, vomiting, melena, hematochezia.     Allergies  erythromycin (Other)    MEDICATIONS:  MEDICATIONS  (STANDING):  atorvastatin 10 milliGRAM(s) Oral at bedtime  diltiazem    milliGRAM(s) Oral daily  diphenhydrAMINE   Injectable 25 milliGRAM(s) IV Push once  ferrous    sulfate 325 milliGRAM(s) Oral daily  fluticasone propionate 50 MICROgram(s)/spray Nasal Spray 1 Spray(s) Both Nostrils at bedtime  heparin  Injectable 5000 Unit(s) SubCutaneous every 8 hours  hydrochlorothiazide 12.5 milliGRAM(s) Oral daily  montelukast 10 milliGRAM(s) Oral at bedtime  multivitamin 1 Tablet(s) Oral daily  pantoprazole    Tablet 40 milliGRAM(s) Oral before breakfast  tamsulosin 0.4 milliGRAM(s) Oral at bedtime    MEDICATIONS  (PRN):  benzocaine 15 mG/menthol 3.6 mG Lozenge 1 Lozenge Oral four times a day PRN Sore Throat  HYDROmorphone  Injectable 0.5 milliGRAM(s) IV Push every 4 hours PRN Severe Pain (7 - 10)  ondansetron Injectable 4 milliGRAM(s) IV Push every 6 hours PRN Nausea    Vital Signs Last 24 Hrs  T(C): 37.4 (26 Nov 2017 14:42), Max: 37.4 (26 Nov 2017 14:42)  T(F): 99.3 (26 Nov 2017 14:42), Max: 99.3 (26 Nov 2017 14:42)  HR: 90 (26 Nov 2017 14:42) (83 - 91)  BP: 122/77 (26 Nov 2017 14:42) (119/69 - 129/69)  BP(mean): --  RR: 16 (26 Nov 2017 14:42) (15 - 17)  SpO2: 100% (26 Nov 2017 14:42) (94% - 100%)    11-25 @ 07:01  -  11-26 @ 07:00  --------------------------------------------------------  IN: 560 mL / OUT: 2050 mL / NET: -1490 mL    11-26 @ 07:01  -  11-26 @ 15:47  --------------------------------------------------------  IN: 450 mL / OUT: 500 mL / NET: -50 mL    PHYSICAL EXAM:    General: Ill-appearing; in no acute distress  HEENT: MMM, conjunctiva and sclera clear  Gastrointestinal: Soft obese mild TTP, mildly distended; No rebound or guarding  Skin: Warm and dry. No obvious rash    LABS:                        7.6    17.6  )-----------( 411      ( 26 Nov 2017 06:03 )             23.8     11-26    137  |  97  |  18  ----------------------------<  135<H>  4.2   |  29  |  1.16    Ca    8.2<L>      26 Nov 2017 06:01  Phos  2.4     11-26  Mg     2.2     11-26    TPro  5.2<L>  /  Alb  1.9<L>  /  TBili  2.8<H>  /  DBili  x   /  AST  82<H>  /  ALT  82<H>  /  AlkPhos  636<H>  11-26    Culture - Fungal, Body Fluid (collected 25 Nov 2017 01:09)  Source: Pleural Fl pleural fluid  Preliminary Report (25 Nov 2017 13:30):    Testing in progress    Culture - Acid Fast - Body Fluid w/Smear (collected 25 Nov 2017 01:09)  Source: Pleural Fl pleural fluid    Culture - Body Fluid with Gram Stain (collected 24 Nov 2017 17:18)  Source: Pleural Fl pleural fluid  Gram Stain (25 Nov 2017 08:57):    No organisms seen    Few WBC's  Preliminary Report (25 Nov 2017 09:16):    No growth to date.    RADIOLOGY & ADDITIONAL STUDIES:  Xray Abdomen 1 View Portable, IMMEDIATE (11.25.17 @ 18:01)  FINDINGS:    There is a right-sided pigtail drainage catheter projecting over the   right upper quadrant. Linear perforated surgical material is also noted   projecting over the right midabdomen. The bowel gas pattern is   nonobstructive. There is a radiodense capsule in the right pelvis.    IMPRESSION:  Capsule is located within the right pelvis.

## 2017-11-26 NOTE — PROGRESS NOTE ADULT - SUBJECTIVE AND OBJECTIVE BOX
ON : Abd xray done as per GI to localize the capsule, capsule in pelvis. capsule study showed old blood at the terminal ileum, no active bleeding. no sign of any bowel obstruction.   11/25 : pulmonary will do pleurx on Monday. PT eval recs [facility] vs home. ON : Abd xray done as per GI to localize the capsule, capsule in pelvis. capsule study showed old blood at the terminal ileum, no active bleeding. no sign of any bowel obstruction.   11/25 : pulmonary will do pleurx on Monday. PT eval recs [facility] vs home.    STATUS POST:  Chest tube placement with US guidance  Insertion of right tunneled central venous catheter with port          Vital Signs Last 24 Hrs  T(C): 36.4 (26 Nov 2017 08:40), Max: 36.9 (25 Nov 2017 16:07)  T(F): 97.5 (26 Nov 2017 08:40), Max: 98.4 (25 Nov 2017 16:07)  HR: 90 (26 Nov 2017 08:40) (83 - 98)  BP: 119/69 (26 Nov 2017 08:40) (118/70 - 129/69)  BP(mean): --  RR: 17 (26 Nov 2017 08:40) (15 - 18)  SpO2: 100% (26 Nov 2017 08:40) (94% - 100%)    I&O's Summary    25 Nov 2017 07:01  -  26 Nov 2017 07:00  --------------------------------------------------------  IN: 560 mL / OUT: 2050 mL / NET: -1490 mL    26 Nov 2017 07:01  -  26 Nov 2017 09:58  --------------------------------------------------------  IN: 0 mL / OUT: 300 mL / NET: -300 mL        SUBJECTIVE: Pt seen and examined at bedside. Breathing improved.     Pain: [ ] YES [ x] NO  Pain (0-10):              Pain Control Adequate: [x ] YES [ ] NO  SOB: [ ]YES [ x] NO  Chest Discomfort: [ ] YES [x ] NO    Nausea: [ ] YES [x ] NO           Vomiting: [ ] YES [x ] NO  Flatus: [ x] YES [ ] NO             Bowel Movement: [x ] YES [ ] NO     Void: [x ]YES [ ]No    Physical Exam:  General Appearance: Appears well, NAD  Neck: Supple  Chest: Equal expansion bilaterally, chest tube in place with decreasing output.   CV: Pulse regular presently  Abdomen: Soft, nontense, nontender, non distended.   Extremities: Grossly symmetric, SCD's in place     LABS:                        7.6    17.6  )-----------( 411      ( 26 Nov 2017 06:03 )             23.8     11-26    137  |  97  |  18  ----------------------------<  135<H>  4.2   |  29  |  1.16    Ca    8.2<L>      26 Nov 2017 06:01  Phos  2.4     11-26  Mg     2.2     11-26    TPro  5.2<L>  /  Alb  1.9<L>  /  TBili  2.8<H>  /  DBili  x   /  AST  82<H>  /  ALT  82<H>  /  AlkPhos  636<H>  11-26    PT/INR - ( 24 Nov 2017 10:59 )   PT: 13.4 sec;   INR: 1.20          PTT - ( 24 Nov 2017 10:59 )  PTT:43.8 sec      RADIOLOGY & ADDITIONAL STUDIES:

## 2017-11-26 NOTE — PROGRESS NOTE ADULT - SUBJECTIVE AND OBJECTIVE BOX
Interval Events:  Patient seen and examined at bedside.  Feels better overall, breathing much improved. No pain.    MEDICATIONS:  Pulmonary:  diphenhydrAMINE   Injectable 25 milliGRAM(s) IV Push once  montelukast 10 milliGRAM(s) Oral at bedtime    Antimicrobials:    Anticoagulants:  heparin  Injectable 5000 Unit(s) SubCutaneous every 8 hours    Cardiac:  diltiazem    milliGRAM(s) Oral daily  hydrochlorothiazide 12.5 milliGRAM(s) Oral daily  tamsulosin 0.4 milliGRAM(s) Oral at bedtime      Allergies    erythromycin (Other)    Intolerances        Vital Signs Last 24 Hrs  T(C): 36.4 (26 Nov 2017 08:40), Max: 36.9 (25 Nov 2017 16:07)  T(F): 97.5 (26 Nov 2017 08:40), Max: 98.4 (25 Nov 2017 16:07)  HR: 90 (26 Nov 2017 08:40) (83 - 91)  BP: 119/69 (26 Nov 2017 08:40) (119/69 - 129/69)  BP(mean): --  RR: 17 (26 Nov 2017 08:40) (15 - 17)  SpO2: 100% (26 Nov 2017 08:40) (94% - 100%)    11-25 @ 07:01 - 11-26 @ 07:00  --------------------------------------------------------  IN: 560 mL / OUT: 2050 mL / NET: -1490 mL    11-26 @ 07:01 - 11-26 @ 14:02  --------------------------------------------------------  IN: 220 mL / OUT: 500 mL / NET: -280 mL          PHYSICAL EXAM:  General: Well developed;  in no acute distress  HEENT: PERRL, EOMI, scleral icterous  Neck: Supple; no masses  Respiratory: decreased BS with crackles at the R base  Cardiovascular: Regular rate and rhythm. S1 and S2 Normal; No murmurs  Gastrointestinal: Soft non-tender non-distended;   Extremities: WWP, +3 edema, no cyanosis  Neurological: Alert and oriented x3  Skin: Warm and dry. No obvious rash    LABS:      CBC Full  -  ( 26 Nov 2017 06:03 )  WBC Count : 17.6 K/uL  Hemoglobin : 7.6 g/dL  Hematocrit : 23.8 %  Platelet Count - Automated : 411 K/uL  Mean Cell Volume : 94.4 fL  Mean Cell Hemoglobin : 30.2 pg  Mean Cell Hemoglobin Concentration : 31.9 g/dL  Auto Neutrophil # : x  Auto Lymphocyte # : x  Auto Monocyte # : x  Auto Eosinophil # : x  Auto Basophil # : x  Auto Neutrophil % : x  Auto Lymphocyte % : x  Auto Monocyte % : x  Auto Eosinophil % : x  Auto Basophil % : x    11-26    137  |  97  |  18  ----------------------------<  135<H>  4.2   |  29  |  1.16    Ca    8.2<L>      26 Nov 2017 06:01  Phos  2.4     11-26  Mg     2.2     11-26    TPro  5.2<L>  /  Alb  1.9<L>  /  TBili  2.8<H>  /  DBili  x   /  AST  82<H>  /  ALT  82<H>  /  AlkPhos  636<H>  11-26                      RADIOLOGY imaging reviewed

## 2017-11-26 NOTE — PROGRESS NOTE ADULT - ASSESSMENT
77yr old M with PMHx significant for GERD, HTN, Dyslipidemia, cardiac arrhythmia, Allergic rhinitis, BPH, who was sent to the ED by his outpatient doctors for worrisome findings on CT.    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  - sp MRCP with CHD and CBD strictures from cholangiocarcinoma with intraductal extension of tumor  - sp ERCP 11/16/17 - with sphincterotomy and placement of 2 uncovered metal stents into R and L hepatic ducts  - Liver biopsy consistent with metastatic cholangiocarcinoma  - Monitor daily LFTs  - Consider onc and palliative consult    # R pleural effusion  - s/p chest tube placement with improvement    # Anemia 2/2 likely UGIB  - sp colonoscopy 11/21/17 with evidence of old blood, but no active bleeding identified, his terminal ileum was intubated and this showed some old blood which could be due to small bowel bleed vs backwash from the colon  - H/H stable; No further e/o GI bleeding (i.e melena, hematochezia, or hematemesis)  - Monitor H/H; transfuse if Hgb <7  - VCE preliminary results shows oozing in the distal duodenum. At the end of the study, the capsule did not clear the small bowel.  - AXR shows capsule in the right pelvis; repeat Abd XR in 2-3 days  - Currently pt denying obstructive symptoms.   - Reviewed signs and symptoms of small bowel obstruction with the patient, including nausea, vomiting, abdominal bloating, obstipation. Pt was instructed to notify physician or nurse if these symptoms occur.  - C/w PPI daily  - Will consider repeat EGD/push enteroscopy if signs of recurrent GI bleeding    Case d/w Dr. Kailey SALAS following

## 2017-11-26 NOTE — PROGRESS NOTE ADULT - PROBLEM SELECTOR PLAN 1
Large pleural effusion on the R that is likely a malignant effusion.  Given progression of symptoms, chest tube placed 11/24 with drainage of 3.5L so far and improvement in sx.  Given effusion is likely malignant - plan to convert to PleurX catheter possibly monday.  - f/u cytology and micro from effusion  - Chest tube clamped today at 13:00 - will let fluid re-accumulate overnight for PleurX placement tomorrow.

## 2017-11-26 NOTE — PROGRESS NOTE ADULT - ASSESSMENT
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  - Carcinoma encasing and obstruction cystic duct and common hepatic duct, with intrahepatic ductal dilation.  - ERCP w/ stent placement  - LFTs mildly decreased compared to admission  - Liver bx shows adenocarcinoma compatible w/ metastatic cholangiocarcinoma  - Possible palliative consult    2) LGIB  - Post ERCP  - Bowel prep 11/19.  - Colonoscopy 11/21  - pRBC x1 (11/21)  - No further episodes    3) R. Pleural effusion  - Chest tube placed (11/24). 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  - Carcinoma encasing and obstruction cystic duct and common hepatic duct, with intrahepatic ductal dilation.  - ERCP w/ stent placement  - LFTs mildly decreased compared to admission  - Liver bx shows adenocarcinoma compatible w/ metastatic cholangiocarcinoma  - Possible palliative consult    2) LGIB  - Post ERCP  - Bowel prep 11/19.  - Colonoscopy 11/21  - pRBC x1 (11/21)  - No further episodes  - waiting for capsule to pass.     3) R. Pleural effusion  - Chest tube placed (11/24). Immediate 1.3 L output  - Patient reports immediate relief  - For pleurx on Monday    4) HTN  - diltiazem, HCTZ    5) BPH  - tamsulosin    6) HLD  - Atorvastatin 10 mg    7) Dispo: [facility] vs home

## 2017-11-27 LAB
ANION GAP SERPL CALC-SCNC: 9 MMOL/L — SIGNIFICANT CHANGE UP (ref 5–17)
B PERT IGG+IGM PNL SER: SIGNIFICANT CHANGE UP
BUN SERPL-MCNC: 21 MG/DL — SIGNIFICANT CHANGE UP (ref 7–23)
CALCIUM SERPL-MCNC: 8 MG/DL — LOW (ref 8.4–10.5)
CHLORIDE SERPL-SCNC: 95 MMOL/L — LOW (ref 96–108)
CO2 SERPL-SCNC: 30 MMOL/L — SIGNIFICANT CHANGE UP (ref 22–31)
COLOR FLD: YELLOW — SIGNIFICANT CHANGE UP
COMMENT - FLUIDS: SIGNIFICANT CHANGE UP
CREAT SERPL-MCNC: 1.14 MG/DL — SIGNIFICANT CHANGE UP (ref 0.5–1.3)
FLUID INTAKE SUBSTANCE CLASS: SIGNIFICANT CHANGE UP
FLUID SEGMENTED GRANULOCYTES: 26 % — SIGNIFICANT CHANGE UP
GLUCOSE SERPL-MCNC: 126 MG/DL — HIGH (ref 70–99)
HCT VFR BLD CALC: 23 % — LOW (ref 39–50)
HGB BLD-MCNC: 7.4 G/DL — LOW (ref 13–17)
LYMPHOCYTES # FLD: 43 % — SIGNIFICANT CHANGE UP
MAGNESIUM SERPL-MCNC: 2.2 MG/DL — SIGNIFICANT CHANGE UP (ref 1.6–2.6)
MCHC RBC-ENTMCNC: 30.3 PG — SIGNIFICANT CHANGE UP (ref 27–34)
MCHC RBC-ENTMCNC: 32.2 G/DL — SIGNIFICANT CHANGE UP (ref 32–36)
MCV RBC AUTO: 94.3 FL — SIGNIFICANT CHANGE UP (ref 80–100)
MESOTHL CELL # FLD: 9 % — SIGNIFICANT CHANGE UP
MONOS+MACROS # FLD: 22 % — SIGNIFICANT CHANGE UP
PHOSPHATE SERPL-MCNC: 2.7 MG/DL — SIGNIFICANT CHANGE UP (ref 2.5–4.5)
PLATELET # BLD AUTO: 471 K/UL — HIGH (ref 150–400)
POTASSIUM SERPL-MCNC: 4 MMOL/L — SIGNIFICANT CHANGE UP (ref 3.5–5.3)
POTASSIUM SERPL-SCNC: 4 MMOL/L — SIGNIFICANT CHANGE UP (ref 3.5–5.3)
RBC # BLD: 2.44 M/UL — LOW (ref 4.2–5.8)
RBC # FLD: 19 % — HIGH (ref 10.3–16.9)
RCV VOL RI: 611 /UL — HIGH (ref 0–5)
SODIUM SERPL-SCNC: 134 MMOL/L — LOW (ref 135–145)
SPECIMEN SOURCE FLD: SIGNIFICANT CHANGE UP
TOTAL NUCLEATED CELL COUNT, BODY FLUID: 112 /UL — HIGH (ref 0–5)
TUBE TYPE: SIGNIFICANT CHANGE UP
WBC # BLD: 18.3 K/UL — HIGH (ref 3.8–10.5)
WBC # FLD AUTO: 18.3 K/UL — HIGH (ref 3.8–10.5)

## 2017-11-27 PROCEDURE — 99232 SBSQ HOSP IP/OBS MODERATE 35: CPT | Mod: GC

## 2017-11-27 RX ORDER — SENNA PLUS 8.6 MG/1
2 TABLET ORAL AT BEDTIME
Qty: 0 | Refills: 0 | Status: DISCONTINUED | OUTPATIENT
Start: 2017-11-27 | End: 2017-12-05

## 2017-11-27 RX ORDER — DOCUSATE SODIUM 100 MG
100 CAPSULE ORAL
Qty: 0 | Refills: 0 | Status: DISCONTINUED | OUTPATIENT
Start: 2017-11-27 | End: 2017-12-05

## 2017-11-27 RX ADMIN — Medication 180 MILLIGRAM(S): at 06:22

## 2017-11-27 RX ADMIN — Medication 100 MILLIGRAM(S): at 13:15

## 2017-11-27 RX ADMIN — ATORVASTATIN CALCIUM 10 MILLIGRAM(S): 80 TABLET, FILM COATED ORAL at 22:03

## 2017-11-27 RX ADMIN — Medication 1 TABLET(S): at 11:22

## 2017-11-27 RX ADMIN — BENZOCAINE AND MENTHOL 1 LOZENGE: 5; 1 LIQUID ORAL at 15:17

## 2017-11-27 RX ADMIN — HEPARIN SODIUM 5000 UNIT(S): 5000 INJECTION INTRAVENOUS; SUBCUTANEOUS at 06:22

## 2017-11-27 RX ADMIN — Medication 325 MILLIGRAM(S): at 11:22

## 2017-11-27 RX ADMIN — MONTELUKAST 10 MILLIGRAM(S): 4 TABLET, CHEWABLE ORAL at 22:03

## 2017-11-27 RX ADMIN — Medication 100 MILLIGRAM(S): at 22:03

## 2017-11-27 RX ADMIN — SENNA PLUS 2 TABLET(S): 8.6 TABLET ORAL at 22:03

## 2017-11-27 RX ADMIN — HEPARIN SODIUM 5000 UNIT(S): 5000 INJECTION INTRAVENOUS; SUBCUTANEOUS at 13:15

## 2017-11-27 RX ADMIN — TAMSULOSIN HYDROCHLORIDE 0.4 MILLIGRAM(S): 0.4 CAPSULE ORAL at 22:03

## 2017-11-27 RX ADMIN — HEPARIN SODIUM 5000 UNIT(S): 5000 INJECTION INTRAVENOUS; SUBCUTANEOUS at 22:03

## 2017-11-27 RX ADMIN — PANTOPRAZOLE SODIUM 40 MILLIGRAM(S): 20 TABLET, DELAYED RELEASE ORAL at 06:22

## 2017-11-27 RX ADMIN — Medication 12.5 MILLIGRAM(S): at 06:22

## 2017-11-27 NOTE — PROGRESS NOTE ADULT - SUBJECTIVE AND OBJECTIVE BOX
Pt seen and examined at bedside  No new c/o  Denies any more bleeding  Feels a bit constipated, but denies n/v/d, abdominal pain/distention, fever, chills      MEDICATIONS:  MEDICATIONS  (STANDING):  atorvastatin 10 milliGRAM(s) Oral at bedtime  diltiazem    milliGRAM(s) Oral daily  diphenhydrAMINE   Injectable 25 milliGRAM(s) IV Push once  ferrous    sulfate 325 milliGRAM(s) Oral daily  fluticasone propionate 50 MICROgram(s)/spray Nasal Spray 1 Spray(s) Both Nostrils at bedtime  heparin  Injectable 5000 Unit(s) SubCutaneous every 8 hours  hydrochlorothiazide 12.5 milliGRAM(s) Oral daily  montelukast 10 milliGRAM(s) Oral at bedtime  multivitamin 1 Tablet(s) Oral daily  pantoprazole    Tablet 40 milliGRAM(s) Oral before breakfast  tamsulosin 0.4 milliGRAM(s) Oral at bedtime    MEDICATIONS  (PRN):  benzocaine 15 mG/menthol 3.6 mG Lozenge 1 Lozenge Oral four times a day PRN Sore Throat  HYDROmorphone  Injectable 0.5 milliGRAM(s) IV Push every 4 hours PRN Severe Pain (7 - 10)  ondansetron Injectable 4 milliGRAM(s) IV Push every 6 hours PRN Nausea      Allergies  erythromycin (Other)    Intolerances      Vital Signs Last 24 Hrs  T(C): 37.1 (27 Nov 2017 08:05), Max: 37.4 (26 Nov 2017 14:42)  T(F): 98.8 (27 Nov 2017 08:05), Max: 99.3 (26 Nov 2017 14:42)  HR: 92 (27 Nov 2017 08:05) (90 - 99)  BP: 120/71 (27 Nov 2017 08:05) (115/73 - 137/82)  BP(mean): --  RR: 17 (27 Nov 2017 08:05) (16 - 17)  SpO2: 97% (27 Nov 2017 08:05) (97% - 100%)    11-26 @ 07:01  -  11-27 @ 07:00  --------------------------------------------------------  IN: 700 mL / OUT: 1300 mL / NET: -600 mL    11-27 @ 07:01  -  11-27 @ 10:02  --------------------------------------------------------  IN: 0 mL / OUT: 200 mL / NET: -200 mL      PHYSICAL EXAM:  General: Ill-appearing; in no acute distress  HEENT: MMM, conjunctiva and sclera clear  Gastrointestinal: Soft obese mild TTP, mildly distended; No rebound or guarding  Skin: Warm and dry. No obvious rash      LABS:  CBC Full  -  ( 27 Nov 2017 07:39 )  WBC Count : 18.3 K/uL  Hemoglobin : 7.4 g/dL  Hematocrit : 23.0 %  Platelet Count - Automated : 471 K/uL  Mean Cell Volume : 94.3 fL  Mean Cell Hemoglobin : 30.3 pg  Mean Cell Hemoglobin Concentration : 32.2 g/dL    134<L>  |  95<L>  |  21  ----------------------------<  126<H>  4.0   |  30  |  1.14    Ca    8.0<L>      27 Nov 2017 07:39  Phos  2.7     11-27  Mg     2.2     11-27    TPro  5.2<L>  /  Alb  1.9<L>  /  TBili  2.8<H>  /  DBili  x   /  AST  82<H>  /  ALT  82<H>  /  AlkPhos  636<H>  11-26        RADIOLOGY & ADDITIONAL STUDIES (The following images were personally reviewed):

## 2017-11-27 NOTE — CHART NOTE - NSCHARTNOTEFT_GEN_A_CORE
Admitting Diagnosis:   Patient is a 77y old  Male who presents with a chief complaint of abdominal pain/jaundice (15 Nov 2017 15:41)      PAST MEDICAL & SURGICAL HISTORY:  BPH (benign prostatic hyperplasia)  HLD (hyperlipidemia)  HTN (hypertension)  History of appendectomy    Current Nutrition Order: Regular w/ EnsureEnlive TID (1050kcal, 60g pro), Prostat TID (300kcal, 45g pro).    PO Intake: Good (%) [   ]  Fair (50-75%) [ x  ] Poor (<25%) [   ]   Suspect pt is consuming ~50% based on dietary recall. Pt reports eating is "hard" for him -in terms of lack of appetite, and difficulty w/ food preparation/self-feeding. RD discussed option of receiving feeding assistance which pt was very receptive to. Discussed w/ RN who has already been assisting in the cutting and preparing of meals at bedside.     GI Issues: Continues to experience early satiety w/o N/V/D/C    Pain: controlled per pt    Skin Integrity: surgical incision per flowsheet     Labs:   11-27    134<L>  |  95<L>  |  21  ----------------------------<  126<H>  4.0   |  30  |  1.14    Ca    8.0<L>      27 Nov 2017 07:39  Phos  2.7     11-27  Mg     2.2     11-27    TPro  5.2<L>  /  Alb  1.9<L>  /  TBili  2.8<H>  /  DBili  x   /  AST  82<H>  /  ALT  82<H>  /  AlkPhos  636<H>  11-26    CAPILLARY BLOOD GLUCOSE          Medications:  MEDICATIONS  (STANDING):  atorvastatin 10 milliGRAM(s) Oral at bedtime  diltiazem    milliGRAM(s) Oral daily  diphenhydrAMINE   Injectable 25 milliGRAM(s) IV Push once  docusate sodium 100 milliGRAM(s) Oral two times a day  ferrous    sulfate 325 milliGRAM(s) Oral daily  fluticasone propionate 50 MICROgram(s)/spray Nasal Spray 1 Spray(s) Both Nostrils at bedtime  heparin  Injectable 5000 Unit(s) SubCutaneous every 8 hours  hydrochlorothiazide 12.5 milliGRAM(s) Oral daily  montelukast 10 milliGRAM(s) Oral at bedtime  multivitamin 1 Tablet(s) Oral daily  pantoprazole    Tablet 40 milliGRAM(s) Oral before breakfast  senna 2 Tablet(s) Oral at bedtime  tamsulosin 0.4 milliGRAM(s) Oral at bedtime    MEDICATIONS  (PRN):  benzocaine 15 mG/menthol 3.6 mG Lozenge 1 Lozenge Oral four times a day PRN Sore Throat  HYDROmorphone  Injectable 0.5 milliGRAM(s) IV Push every 4 hours PRN Severe Pain (7 - 10)  ondansetron Injectable 4 milliGRAM(s) IV Push every 6 hours PRN Nausea      Weight: 85.7 (11/22)  Daily Height in cm: 182.88 (22 Nov 2017 06:41)      Weight Change: up 5kg from admission, possible fluid changes.   No new wts taken.    Estimated energy needs:   Needs adjusted 2/2 age, unintentional wt loss and hypermetabolic state  2107-2592kcal (25-30kcal/kg)  101-118g pro (1.2-1.4g pro/kg)  2529-2950mL (30-35mL/kg)    Subjective: Pt s/p chemo port placement 11/22. Suspect pt is consuming ~50% based on dietary recall. Pt reports eating is "hard" for him -in terms of lack of appetite, and difficulty w/ food preparation/self-feeding. RD discussed option of receiving feeding assistance which pt was very receptive to. Discussed w/ RN who has already been assisting in the cutting and preparing of meals at bedside.  Reviewed w/ pt various oral nutrition supplements and encouraged their consumption to be between meals to optimize food intake as well. Pt has been tolerating regular diet well -however if experiencing pain/ discomfort w/ meals recommend fat restriction 2/2 cholangiocarcinoma to avoid potential pain w/ digestion of fat.     Previous Nutrition Diagnosis:  Inadequate kcal/pro intake RT inability to meet needs 2/2 Clear liquid diet, AEB estimated ~25% needs met via clear liquid diet    Active [  ]  Resolved [ x  ]    If resolved, new PES: Inadequate kcal/pro intake RT early satiety AEB pt consuming ~50% meals/ONS.     Goal: Pt to meet >75% estimated needs w/ good tolerance    Recommendations:  1. Continue on current diet order as tolerated However if pt begins to experience pain/discomfort rec Low fat diet w/ EnsureClears TID and Prostat BID  2. Obtain recent wt for trending  3. Appreciate assistance provided at meals PRN    Education: RD provided edu on ONS options, importance of meeting nutrient needs. And monitoring for tolerance after EnsureEnlives.    Risk Level: High [ x  ] Moderate [   ] Low [   ]. Admitting Diagnosis:   Patient is a 77y old  Male who presents with a chief complaint of abdominal pain/jaundice (15 Nov 2017 15:41)      PAST MEDICAL & SURGICAL HISTORY:  BPH (benign prostatic hyperplasia)  HLD (hyperlipidemia)  HTN (hypertension)  History of appendectomy    Current Nutrition Order: Regular w/ EnsureEnlive TID (1050kcal, 60g pro), Prostat TID (300kcal, 45g pro).    PO Intake: Good (%) [   ]  Fair (50-75%) [ x  ] Poor (<25%) [   ]   Suspect pt is consuming ~50% based on dietary recall. Pt reports eating is "hard" for him -in terms of lack of appetite, and difficulty w/ food preparation/self-feeding. RD discussed option of receiving feeding assistance which pt was very receptive to. Discussed w/ RN who has already been assisting in the cutting and preparing of meals at bedside.     GI Issues: Continues to experience early satiety w/o N/V/D/C    Pain: controlled per pt    Skin Integrity: surgical incision per flowsheet     Labs:   11-27    134<L>  |  95<L>  |  21  ----------------------------<  126<H>  4.0   |  30  |  1.14    Ca    8.0<L>      27 Nov 2017 07:39  Phos  2.7     11-27  Mg     2.2     11-27    TPro  5.2<L>  /  Alb  1.9<L>  /  TBili  2.8<H>  /  DBili  x   /  AST  82<H>  /  ALT  82<H>  /  AlkPhos  636<H>  11-26    CAPILLARY BLOOD GLUCOSE          Medications:  MEDICATIONS  (STANDING):  atorvastatin 10 milliGRAM(s) Oral at bedtime  diltiazem    milliGRAM(s) Oral daily  diphenhydrAMINE   Injectable 25 milliGRAM(s) IV Push once  docusate sodium 100 milliGRAM(s) Oral two times a day  ferrous    sulfate 325 milliGRAM(s) Oral daily  fluticasone propionate 50 MICROgram(s)/spray Nasal Spray 1 Spray(s) Both Nostrils at bedtime  heparin  Injectable 5000 Unit(s) SubCutaneous every 8 hours  hydrochlorothiazide 12.5 milliGRAM(s) Oral daily  montelukast 10 milliGRAM(s) Oral at bedtime  multivitamin 1 Tablet(s) Oral daily  pantoprazole    Tablet 40 milliGRAM(s) Oral before breakfast  senna 2 Tablet(s) Oral at bedtime  tamsulosin 0.4 milliGRAM(s) Oral at bedtime    MEDICATIONS  (PRN):  benzocaine 15 mG/menthol 3.6 mG Lozenge 1 Lozenge Oral four times a day PRN Sore Throat  HYDROmorphone  Injectable 0.5 milliGRAM(s) IV Push every 4 hours PRN Severe Pain (7 - 10)  ondansetron Injectable 4 milliGRAM(s) IV Push every 6 hours PRN Nausea      Weight: 85.7 (11/22)  Daily Height in cm: 182.88 (22 Nov 2017 06:41)      Weight Change: up 5kg from admission, possible fluid changes.   No new wts taken.    Estimated energy needs:   Needs adjusted 2/2 age, unintentional wt loss and hypermetabolic state  2107-2592kcal (25-30kcal/kg)  101-118g pro (1.2-1.4g pro/kg)  2529-2950mL (30-35mL/kg)    Subjective: Pt s/p chemo port placement 11/22. Suspect pt is consuming ~50% based on dietary recall. Pt reports eating is "hard" for him -in terms of lack of appetite, and difficulty w/ food preparation/self-feeding. RD discussed option of receiving feeding assistance which pt was very receptive to. Discussed w/ RN who has already been assisting in the cutting and preparing of meals at bedside.  Reviewed w/ pt various oral nutrition supplements and encouraged their consumption to be between meals to optimize food intake as well. Pt has been tolerating regular diet well -however if experiencing pain/ discomfort w/ meals recommend fat restriction 2/2 cholangiocarcinoma to avoid potential pain w/ digestion of fat.     Previous Nutrition Diagnosis:  Inadequate kcal/pro intake RT inability to meet needs 2/2 Clear liquid diet, AEB estimated ~25% needs met via clear liquid diet    Active [  ]  Resolved [ x  ]    If resolved, new PES: Inadequate kcal/pro intake RT early satiety AEB pt consuming ~50% meals/ONS.     Goal: Pt to meet >75% estimated needs w/ good tolerance    Recommendations:  1. Continue on current diet order as tolerated However if pt begins to experience pain/discomfort rec Low fat diet w/ EnsureClears TID and Prostat BID  2. Obtain recent wt for trending  3. Appreciate assistance provided at meals PRN  4. Obtain new pre-alb labs to assess protein needs    Education: RD provided edu on ONS options, importance of meeting nutrient needs. And monitoring for tolerance after EnsureEnlives.    Risk Level: High [ x  ] Moderate [   ] Low [   ].

## 2017-11-27 NOTE — PROGRESS NOTE ADULT - ASSESSMENT
76 yo M, PMH of HTN, HPL, presenting with obstructive jaundice  2/2 metastatic cholangiocarcinoma, with progression of pleural effusion and respiratory symptoms s/p chest tube placement 11/24

## 2017-11-27 NOTE — PROGRESS NOTE ADULT - ASSESSMENT
77yr old M with PMHx significant for GERD, HTN, Dyslipidemia, cardiac arrhythmia, Allergic rhinitis, BPH, who was sent to the ED by his outpatient doctors for worrisome findings on CT.    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  - sp MRCP with CHD and CBD strictures from cholangiocarcinoma with intraductal extension of tumor  - sp ERCP 11/16/17 - with sphincterotomy and placement of 2 uncovered metal stents into R and L hepatic ducts  - Liver biopsy consistent with metastatic cholangiocarcinoma  - Monitor daily LFTs  - Consider onc and palliative consult    # R pleural effusion  - s/p chest tube placement with improvement    # Anemia 2/2 likely UGIB  - sp colonoscopy 11/21/17 with evidence of old blood, but no active bleeding identified, his terminal ileum was intubated and this showed some old blood which could be due to small bowel bleed vs backwash from the colon  - VCE 11/23/17 - preliminary results shows oozing in the distal duodenum. At the end of the study, the capsule did not clear the small bowel.  - H/H stable; No further e/o GI bleeding (i.e melena, hematochezia, or hematemesis)  - Monitor H/H; transfuse if Hgb <7  - AXR shows capsule in the right pelvis; repeat Abd XR in 2-3 days  - Currently pt denying obstructive symptoms.   - Reviewed signs and symptoms of small bowel obstruction with the patient, including nausea, vomiting, abdominal bloating, obstipation. Pt was instructed to notify physician or nurse if these symptoms occur.  - C/w PPI daily  - patient currently not amenable to further endoscopic procedures  - Will consider repeat EGD/push enteroscopy if signs of recurrent GI bleeding  - please start on miralax daily    Case discussed with attending Dr. Bonner  GI following

## 2017-11-27 NOTE — PROGRESS NOTE ADULT - SUBJECTIVE AND OBJECTIVE BOX
Interval Events:  Patient seen and examined at bedside. Continues to feel much improved from a breathing standpoint - able to sleep with head at 30 degrees and no SOB, no cough/chest pain/fever/chills. Feels like the swelling in his legs has also improved. Has not been OOBTC since admission and feels very weak/deconditioned.        MEDICATIONS:  Pulmonary:  diphenhydrAMINE   Injectable 25 milliGRAM(s) IV Push once  montelukast 10 milliGRAM(s) Oral at bedtime    Antimicrobials:    Anticoagulants:  heparin  Injectable 5000 Unit(s) SubCutaneous every 8 hours    Cardiac:  diltiazem    milliGRAM(s) Oral daily  hydrochlorothiazide 12.5 milliGRAM(s) Oral daily  tamsulosin 0.4 milliGRAM(s) Oral at bedtime      Allergies    erythromycin (Other)    Intolerances        Vital Signs Last 24 Hrs  T(C): 37.1 (27 Nov 2017 08:05), Max: 37.4 (26 Nov 2017 14:42)  T(F): 98.8 (27 Nov 2017 08:05), Max: 99.3 (26 Nov 2017 14:42)  HR: 92 (27 Nov 2017 08:05) (90 - 99)  BP: 120/71 (27 Nov 2017 08:05) (115/73 - 137/82)  BP(mean): --  RR: 17 (27 Nov 2017 08:05) (16 - 17)  SpO2: 97% (27 Nov 2017 08:05) (97% - 100%)    11-26 @ 07:01 - 11-27 @ 07:00  --------------------------------------------------------  IN: 700 mL / OUT: 1300 mL / NET: -600 mL    11-27 @ 07:01 - 11-27 @ 10:35  --------------------------------------------------------  IN: 0 mL / OUT: 200 mL / NET: -200 mL          PHYSICAL EXAM:    General: Well developed; in no acute distress  Eyes: PERRL, EOM intact; conjunctiva and sclera clear  ENMT: No nasal discharge; airway clear  Neck: Supple; non tender; no masses  Respiratory: bibasilar rales, R>L  Cardiovascular: Regular rate and rhythm. S1 and S2 Normal; No murmurs, gallops or rubs  Gastrointestinal: Soft, nontender  Extremities: +2 pitting edema B/L  Neurological: Alert and oriented x3  Skin: Warm and dry. No obvious rash    LABS:      CBC Full  -  ( 27 Nov 2017 07:39 )  WBC Count : 18.3 K/uL  Hemoglobin : 7.4 g/dL  Hematocrit : 23.0 %  Platelet Count - Automated : 471 K/uL  Mean Cell Volume : 94.3 fL  Mean Cell Hemoglobin : 30.3 pg  Mean Cell Hemoglobin Concentration : 32.2 g/dL      11-27    134<L>  |  95<L>  |  21  ----------------------------<  126<H>  4.0   |  30  |  1.14    Ca    8.0<L>      27 Nov 2017 07:39  Phos  2.7     11-27  Mg     2.2     11-27    TPro  5.2<L>  /  Alb  1.9<L>  /  TBili  2.8<H>  /  DBili  x   /  AST  82<H>  /  ALT  82<H>  /  AlkPhos  636<H>  11-26                      RADIOLOGY & ADDITIONAL STUDIES (The following images were personally reviewed):  < from: Xray Chest 1 View AP/PA (11.26.17 @ 09:12) >  Findings: There is again a right chest wall [id number]. Right-sided   chest tube again present. Resolution of right pleural effusion. Minimal   gas present within right pleural space. Small bibasilar   atelectasis/infiltrates.    Impression: Right pleural effusion has resolved.    < end of copied text >

## 2017-11-27 NOTE — PROGRESS NOTE ADULT - PROBLEM SELECTOR PLAN 1
Large pleural effusion on the R that is likely a malignant effusion.  Given progression of symptoms, chest tube placed 11/24 with drainage of 3.5L so far and improvement in sx.  Given effusion is likely malignant - plan to convert to PleurX catheter; however, after clamping chest tube x 24 hrs, physical exam consistent with almost complete resolution. Will F/U on output (1500cc at 1030AM) and repeat CXR. If no real accumulation of fluid, may not need urgent in patient placement of pleurX catheter and can follow as out patient to assess if effusion is slowly recurring or not recurring. If drainage persists, will likely need in patient PleurX.   - f/u cytology and micro from effusion  - incentive spirometry, physical therapy, OOBTC

## 2017-11-27 NOTE — PROGRESS NOTE ADULT - ASSESSMENT
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  - Carcinoma encasing and obstruction cystic duct and common hepatic duct, with intrahepatic ductal dilation.  - ERCP w/ stent placement  - LFTs mildly decreased compared to admission  - Liver bx shows adenocarcinoma compatible w/ metastatic cholangiocarcinoma  - Possible palliative consult    2) LGIB  - Post ERCP  - Bowel prep 11/19.  - Colonoscopy 11/21  - pRBC x1 (11/21)  - No further episodes    3) R. Pleural effusion  - Chest tube placed (11/24). Immediate 1.3 L output  - Patient reports immediate relief  - For pleurx on Monday    4) HTN  - diltiazem, HCTZ    5) BPH  - tamsulosin    6) HLD  - Atorvastatin 10 mg    7) Dispo: [facility] vs home

## 2017-11-27 NOTE — PROGRESS NOTE ADULT - SUBJECTIVE AND OBJECTIVE BOX
ON: ANKIT.   11/26: Chest tube clamped. Urine, blood ctx sent CXR improvement of effusion, possible r infiltrates ON: ANKIT.   11/26: Chest tube clamped. Urine, blood ctx sent CXR improvement of effusion, possible r infiltrates      STATUS POST:  Chest tube placement with US guidance  Insertion of right tunneled central venous catheter with port      Vital Signs Last 24 Hrs  T(C): 37.2 (27 Nov 2017 14:43), Max: 37.2 (26 Nov 2017 20:25)  T(F): 99 (27 Nov 2017 14:43), Max: 99 (27 Nov 2017 14:43)  HR: 94 (27 Nov 2017 14:55) (90 - 99)  BP: 114/70 (27 Nov 2017 14:55) (114/70 - 137/82)  BP(mean): --  RR: 17 (27 Nov 2017 14:43) (17 - 17)  SpO2: 94% (27 Nov 2017 14:55) (94% - 99%)    I&O's Summary    26 Nov 2017 07:01  -  27 Nov 2017 07:00  --------------------------------------------------------  IN: 700 mL / OUT: 1300 mL / NET: -600 mL    27 Nov 2017 07:01  -  27 Nov 2017 15:48  --------------------------------------------------------  IN: 0 mL / OUT: 200 mL / NET: -200 mL        SUBJECTIVE: Pt seen and examined at bedside. Feeling okay.     Pain: [ ] YES [x ] NO  Pain (0-10):              Pain Control Adequate: [x ] YES [ ] NO  SOB: [ ]YES [x ] NO  Chest Discomfort: [ ] YES [x ] NO    Nausea: [ ] YES [x ] NO           Vomiting: [ ] YES [x ] NO  Flatus: [x ] YES [ ] NO             Bowel Movement: [ ] YES [x ] NO     Void: [x ]YES [ ]No    Physical Exam:  General Appearance: Appears well, NAD  Neck: Supple  Chest: Equal expansion bilaterally  CV: Pulse regular presently  Abdomen: Softly distended, nontense,   Extremities: Grossly symmetric, SCD's in place     LABS:                        7.4    18.3  )-----------( 471      ( 27 Nov 2017 07:39 )             23.0     11-27    134<L>  |  95<L>  |  21  ----------------------------<  126<H>  4.0   |  30  |  1.14    Ca    8.0<L>      27 Nov 2017 07:39  Phos  2.7     11-27  Mg     2.2     11-27    TPro  5.2<L>  /  Alb  1.9<L>  /  TBili  2.8<H>  /  DBili  x   /  AST  82<H>  /  ALT  82<H>  /  AlkPhos  636<H>  11-26          RADIOLOGY & ADDITIONAL STUDIES:

## 2017-11-28 DIAGNOSIS — Z71.89 OTHER SPECIFIED COUNSELING: ICD-10-CM

## 2017-11-28 DIAGNOSIS — D64.9 ANEMIA, UNSPECIFIED: ICD-10-CM

## 2017-11-28 DIAGNOSIS — R63.0 ANOREXIA: ICD-10-CM

## 2017-11-28 DIAGNOSIS — Z78.9 OTHER SPECIFIED HEALTH STATUS: ICD-10-CM

## 2017-11-28 DIAGNOSIS — Z51.5 ENCOUNTER FOR PALLIATIVE CARE: ICD-10-CM

## 2017-11-28 DIAGNOSIS — C22.1 INTRAHEPATIC BILE DUCT CARCINOMA: ICD-10-CM

## 2017-11-28 LAB
ALBUMIN SERPL ELPH-MCNC: 2.1 G/DL — LOW (ref 3.3–5)
ALP SERPL-CCNC: 504 U/L — HIGH (ref 40–120)
ALT FLD-CCNC: 66 U/L — HIGH (ref 10–45)
ANION GAP SERPL CALC-SCNC: 14 MMOL/L — SIGNIFICANT CHANGE UP (ref 5–17)
AST SERPL-CCNC: 75 U/L — HIGH (ref 10–40)
BILIRUB SERPL-MCNC: 2.6 MG/DL — HIGH (ref 0.2–1.2)
BUN SERPL-MCNC: 21 MG/DL — SIGNIFICANT CHANGE UP (ref 7–23)
CALCIUM SERPL-MCNC: 8 MG/DL — LOW (ref 8.4–10.5)
CHLORIDE SERPL-SCNC: 93 MMOL/L — LOW (ref 96–108)
CO2 SERPL-SCNC: 28 MMOL/L — SIGNIFICANT CHANGE UP (ref 22–31)
CREAT SERPL-MCNC: 1.18 MG/DL — SIGNIFICANT CHANGE UP (ref 0.5–1.3)
CULTURE RESULTS: NO GROWTH — SIGNIFICANT CHANGE UP
GLUCOSE SERPL-MCNC: 154 MG/DL — HIGH (ref 70–99)
HCT VFR BLD CALC: 24.5 % — LOW (ref 39–50)
HGB BLD-MCNC: 7.8 G/DL — LOW (ref 13–17)
MAGNESIUM SERPL-MCNC: 2.2 MG/DL — SIGNIFICANT CHANGE UP (ref 1.6–2.6)
MCHC RBC-ENTMCNC: 30.6 PG — SIGNIFICANT CHANGE UP (ref 27–34)
MCHC RBC-ENTMCNC: 31.8 G/DL — LOW (ref 32–36)
MCV RBC AUTO: 96.1 FL — SIGNIFICANT CHANGE UP (ref 80–100)
PHOSPHATE SERPL-MCNC: 2.7 MG/DL — SIGNIFICANT CHANGE UP (ref 2.5–4.5)
PLATELET # BLD AUTO: 467 K/UL — HIGH (ref 150–400)
POTASSIUM SERPL-MCNC: 3.7 MMOL/L — SIGNIFICANT CHANGE UP (ref 3.5–5.3)
POTASSIUM SERPL-SCNC: 3.7 MMOL/L — SIGNIFICANT CHANGE UP (ref 3.5–5.3)
PROT SERPL-MCNC: 5.6 G/DL — LOW (ref 6–8.3)
RBC # BLD: 2.55 M/UL — LOW (ref 4.2–5.8)
RBC # FLD: 18.6 % — HIGH (ref 10.3–16.9)
SODIUM SERPL-SCNC: 135 MMOL/L — SIGNIFICANT CHANGE UP (ref 135–145)
SPECIMEN SOURCE: SIGNIFICANT CHANGE UP
WBC # BLD: 17.9 K/UL — HIGH (ref 3.8–10.5)
WBC # FLD AUTO: 17.9 K/UL — HIGH (ref 3.8–10.5)

## 2017-11-28 PROCEDURE — 93970 EXTREMITY STUDY: CPT | Mod: 26

## 2017-11-28 PROCEDURE — 71010: CPT | Mod: 26,77

## 2017-11-28 PROCEDURE — 99223 1ST HOSP IP/OBS HIGH 75: CPT

## 2017-11-28 PROCEDURE — 71010: CPT | Mod: 26

## 2017-11-28 PROCEDURE — 74000: CPT | Mod: 26

## 2017-11-28 RX ORDER — POTASSIUM CHLORIDE 20 MEQ
40 PACKET (EA) ORAL ONCE
Qty: 0 | Refills: 0 | Status: COMPLETED | OUTPATIENT
Start: 2017-11-28 | End: 2017-11-28

## 2017-11-28 RX ORDER — DRONABINOL 2.5 MG
2.5 CAPSULE ORAL
Qty: 0 | Refills: 0 | Status: DISCONTINUED | OUTPATIENT
Start: 2017-11-28 | End: 2017-12-05

## 2017-11-28 RX ADMIN — HEPARIN SODIUM 5000 UNIT(S): 5000 INJECTION INTRAVENOUS; SUBCUTANEOUS at 06:47

## 2017-11-28 RX ADMIN — PANTOPRAZOLE SODIUM 40 MILLIGRAM(S): 20 TABLET, DELAYED RELEASE ORAL at 06:46

## 2017-11-28 RX ADMIN — ATORVASTATIN CALCIUM 10 MILLIGRAM(S): 80 TABLET, FILM COATED ORAL at 21:17

## 2017-11-28 RX ADMIN — Medication 1 TABLET(S): at 12:37

## 2017-11-28 RX ADMIN — MONTELUKAST 10 MILLIGRAM(S): 4 TABLET, CHEWABLE ORAL at 21:17

## 2017-11-28 RX ADMIN — Medication 100 MILLIGRAM(S): at 13:57

## 2017-11-28 RX ADMIN — HEPARIN SODIUM 5000 UNIT(S): 5000 INJECTION INTRAVENOUS; SUBCUTANEOUS at 13:57

## 2017-11-28 RX ADMIN — TAMSULOSIN HYDROCHLORIDE 0.4 MILLIGRAM(S): 0.4 CAPSULE ORAL at 21:17

## 2017-11-28 RX ADMIN — HEPARIN SODIUM 5000 UNIT(S): 5000 INJECTION INTRAVENOUS; SUBCUTANEOUS at 21:17

## 2017-11-28 RX ADMIN — Medication 40 MILLIEQUIVALENT(S): at 12:37

## 2017-11-28 RX ADMIN — Medication 325 MILLIGRAM(S): at 12:37

## 2017-11-28 RX ADMIN — Medication 180 MILLIGRAM(S): at 06:47

## 2017-11-28 RX ADMIN — SENNA PLUS 2 TABLET(S): 8.6 TABLET ORAL at 21:17

## 2017-11-28 RX ADMIN — Medication 12.5 MILLIGRAM(S): at 06:46

## 2017-11-28 RX ADMIN — BENZOCAINE AND MENTHOL 1 LOZENGE: 5; 1 LIQUID ORAL at 20:13

## 2017-11-28 NOTE — CONSULT NOTE ADULT - ASSESSMENT
Patient is a 77 year old pleasant male with a PMHx of HTN, HLD, arrhythmia, and BPH who was admitted to [facility] on 11/15 at the request of his PCP for an abnormal CT and found to have metastatic cholangiocarcinoma

## 2017-11-28 NOTE — CONSULT NOTE ADULT - PROBLEM SELECTOR RECOMMENDATION 4
Likely sequelae of cancer  -Recommend Marinol 2.5mg in AM. Can titrate up to BID in several days if needed. Patient and wife agree to plan.

## 2017-11-28 NOTE — PROGRESS NOTE ADULT - SUBJECTIVE AND OBJECTIVE BOX
Pt seen and examined at bedside  No new c/o  Denies any more bleeding  Feels a bit constipated, but denies n/v/d, abdominal pain/distention, fever, chills       MEDICATIONS:  MEDICATIONS  (STANDING):  atorvastatin 10 milliGRAM(s) Oral at bedtime  diltiazem    milliGRAM(s) Oral daily  diphenhydrAMINE   Injectable 25 milliGRAM(s) IV Push once  docusate sodium 100 milliGRAM(s) Oral two times a day  ferrous    sulfate 325 milliGRAM(s) Oral daily  fluticasone propionate 50 MICROgram(s)/spray Nasal Spray 1 Spray(s) Both Nostrils at bedtime  heparin  Injectable 5000 Unit(s) SubCutaneous every 8 hours  hydrochlorothiazide 12.5 milliGRAM(s) Oral daily  montelukast 10 milliGRAM(s) Oral at bedtime  multivitamin 1 Tablet(s) Oral daily  pantoprazole    Tablet 40 milliGRAM(s) Oral before breakfast  potassium chloride   Powder 40 milliEquivalent(s) Oral once  senna 2 Tablet(s) Oral at bedtime  tamsulosin 0.4 milliGRAM(s) Oral at bedtime    MEDICATIONS  (PRN):  benzocaine 15 mG/menthol 3.6 mG Lozenge 1 Lozenge Oral four times a day PRN Sore Throat  HYDROmorphone  Injectable 0.5 milliGRAM(s) IV Push every 4 hours PRN Severe Pain (7 - 10)  ondansetron Injectable 4 milliGRAM(s) IV Push every 6 hours PRN Nausea      Allergies  erythromycin (Other)    Intolerances      Vital Signs Last 24 Hrs  T(C): 37.2 (28 Nov 2017 09:05), Max: 37.5 (27 Nov 2017 16:40)  T(F): 99 (28 Nov 2017 09:05), Max: 99.5 (27 Nov 2017 16:40)  HR: 84 (28 Nov 2017 09:05) (84 - 95)  BP: 120/72 (28 Nov 2017 09:05) (114/70 - 124/73)  BP(mean): --  RR: 17 (28 Nov 2017 09:05) (16 - 18)  SpO2: 96% (28 Nov 2017 09:05) (94% - 97%)    11-27 @ 07:01  -  11-28 @ 07:00  --------------------------------------------------------  IN: 120 mL / OUT: 200 mL / NET: -80 mL    11-28 @ 07:01  -  11-28 @ 10:13  --------------------------------------------------------  IN: 120 mL / OUT: 400 mL / NET: -280 mL        PHYSICAL EXAM:  General: Ill-appearing; in no acute distress  HEENT: MMM, conjunctiva and sclera clear  Gastrointestinal: Soft obese mild TTP, mildly distended; No rebound or guarding  Skin: Warm and dry. No obvious rash    LABS:  CBC Full  -  ( 28 Nov 2017 07:32 )  WBC Count : 17.9 K/uL  Hemoglobin : 7.8 g/dL  Hematocrit : 24.5 %  Platelet Count - Automated : 467 K/uL  Mean Cell Volume : 96.1 fL  Mean Cell Hemoglobin : 30.6 pg  Mean Cell Hemoglobin Concentration : 31.8 g/dL    135  |  93<L>  |  21  ----------------------------<  154<H>  3.7   |  28  |  1.18    Ca    8.0<L>      28 Nov 2017 07:32  Phos  2.7     11-28  Mg     2.2     11-28    TPro  5.6<L>  /  Alb  2.1<L>  /  TBili  2.6<H>  /  DBili  x   /  AST  75<H>  /  ALT  66<H>  /  AlkPhos  504<H>  11-28          RADIOLOGY & ADDITIONAL STUDIES (The following images were personally reviewed):

## 2017-11-28 NOTE — CONSULT NOTE ADULT - SUBJECTIVE AND OBJECTIVE BOX
JAVAN KEYES   MRN-[id number]     (1940):     HPI:  Patient is a 77 year old pleasant male with a PMHx of HTN, HLD, arrhythmia, and BPH who was admitted to [facility] on 11/15 at the request of his PCP for an abnormal CT. 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 ERCP was done , with sphincterotomy and placement of two metal stents in o the R and L hepatic ducts. Hospital course was complicated by melena that has since resolved and a R malignant pleural effusions now with chest tube. Palliative care was consulted as the patient is not a surgical or chemotherapy candidate.    The patient's wife and HCP Cassie was at bedside during the interview.     The patient's biggest concern is his lack of appetite. He reports diminished taste and hunger but denies nausea or vomiting as being the etiology of his poor appetite. He said his appetite began to decline about a month ago with subsequent weight loss as well. At this time, the patient is so weak that he can barely get out of bed or feed himself should he try. He says he wants to beat the cancer but doesn't think it will be possible given the extent of the disease and what the physicians have told him about his prognosis. The patient only has pain in his right abdomen when he moves but otherwise reports feeling comfortable.    Cassie is most worried about her  getting better.     PAST MEDICAL & SURGICAL HISTORY:  BPH (benign prostatic hyperplasia)  HLD (hyperlipidemia)  HTN (hypertension)  History of appendectomy    FAMILY HISTORY: possibly gallbladder cancer in his mother    ROS:  +weakness. -n/v/d/c, no pain                    Dyspnea (Neeraj 0-10): 2                       N/V (Y/N):                              Secretions (Y/N) :                Agitation(Y/N):   Pain (Y/N):       -Provocation/Palliation: pt denies  -Quality/Quantity: pt denies  -Radiating: pt denies  -Severity: pt denies  -Timing/Frequency: pt denies  -Impact on ADLs: pt denies    General:  +weakness  HEENT:    no headache, dry throat, dysphagia, odynophagia  Neck:  Denied  CVS:  No CP, palpitations  Resp:  no SOB, no cough  GI:  mild abd pain with movement. +abd swelling. No n/v/d/c  :  no dysuria, hematuria  Musc:  weakness  Neuro:  no focal deficits  Psych:  none  Skin:  no rash  Lymph:  none    Allergies    erythromycin (Other)    Intolerances        Opiate Naive (Y/N):   -iStop reviewed (Y/N):    Medications:      MEDICATIONS  (STANDING):  atorvastatin 10 milliGRAM(s) Oral at bedtime  diltiazem    milliGRAM(s) Oral daily  diphenhydrAMINE   Injectable 25 milliGRAM(s) IV Push once  docusate sodium 100 milliGRAM(s) Oral two times a day  ferrous    sulfate 325 milliGRAM(s) Oral daily  fluticasone propionate 50 MICROgram(s)/spray Nasal Spray 1 Spray(s) Both Nostrils at bedtime  heparin  Injectable 5000 Unit(s) SubCutaneous every 8 hours  hydrochlorothiazide 12.5 milliGRAM(s) Oral daily  montelukast 10 milliGRAM(s) Oral at bedtime  multivitamin 1 Tablet(s) Oral daily  pantoprazole    Tablet 40 milliGRAM(s) Oral before breakfast  senna 2 Tablet(s) Oral at bedtime  tamsulosin 0.4 milliGRAM(s) Oral at bedtime    MEDICATIONS  (PRN):  benzocaine 15 mG/menthol 3.6 mG Lozenge 1 Lozenge Oral four times a day PRN Sore Throat  HYDROmorphone  Injectable 0.5 milliGRAM(s) IV Push every 4 hours PRN Severe Pain (7 - 10)  ondansetron Injectable 4 milliGRAM(s) IV Push every 6 hours PRN Nausea      Labs:    CBC:                        7.8    17.9  )-----------( 467      ( 2017 07:32 )             24.5     CMP:        135  |  93<L>  |  21  ----------------------------<  154<H>  3.7   |  28  |  1.18    Ca    8.0<L>      2017 07:32  Phos  2.7       Mg     2.2         TPro  5.6<L>  /  Alb  2.1<L>  /  TBili  2.6<H>  /  DBili  x   /  AST  75<H>  /  ALT  66<H>  /  AlkPhos  504<H>            Imaging:  Reviewed    PEx:  T(C): 36.7 (17 @ 13:35), Max: 37.5 (17 @ 16:40)  HR: 93 (17 @ 13:35) (84 - 95)  BP: 117/71 (17 @ 13:35) (114/70 - 124/73)  RR: 17 (17 @ 13:35) (16 - 18)  SpO2: 96% (17 @ 13:35) (94% - 97%)  Wt(kg): --  Daily     Daily   CAPILLARY BLOOD GLUCOSE        I&O's Summary    2017 07:  -  2017 07:00  --------------------------------------------------------  IN: 120 mL / OUT: 200 mL / NET: -80 mL    2017 07:  -  2017 14:06  --------------------------------------------------------  IN: 120 mL / OUT: 400 mL / NET: -280 mL        General: lying comfortably in bed  HEENT:  MMM  Neck: no JVD  CVS: tachycardic but regular  Resp: dyspneic with some use of accessory muscles but able to complete full sentences. +right chest tube draining serosanguinous fluid, no tenderness at insertion site. Bibasilar crackles R>L  GI:  distended, tympanic but non tender. hyperactive BS  :  did not examine  Musc:  no evidence of muscle atrophy  Neuro: no focal deficits, aaox3  Psych:  normal affect  Skin: anasarca  Lymph: no adenopathy  Preadmit Karnofsky:  60%           Current Karnofsky:    60%  Cachexia (Y/N):   BMI:25.6    Advanced Directives:     Full Code     DNR/DNI     MOLST     DPOA     Living Will     Decision maker: HCP Cassie (wife) [phone]. Copy in chart    Social History: Patient lives with his wife Cassie in a one floor house with three steps to get through the front door. They have three children, two of which are estranged from the family. The remaining daughter lives in [place]. Patient's sister lives in [place] and has been speaking with wife about his health. The patient is a former smoker, used to walk 20 minutes daily, identifies as W[employer] but does not practice.    GOALS OF CARE DISCUSSION       Palliative care info/counseling provided	           Family meeting       Documentation of GOC: ongoing discussion with surgical oncologist and team	          REFERRALS  		        Palliative Med        Unit SW/Case Mgmt       Patient/Family Support       Massage Therapy       Music Therapy       Hospice       Nutrition       PT/OT JAVAN KEYES   [id number]     (1940):     HPI:  Patient is a 77 year old pleasant male with a PMHx of HTN, HLD, arrhythmia, and BPH who was admitted to [facility] on 11/15 at the request of his PCP for an abnormal CT. 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 ERCP was done , with sphincterotomy and placement of two metal stents in o the R and L hepatic ducts. Hospital course was complicated by melena that has since resolved and a R malignant pleural effusions now with chest tube. Palliative care was consulted as the patient is not a surgical or chemotherapy candidate.    The patient's wife and HCP Cassie was at bedside during the interview.     The patient's biggest concern is his lack of appetite. He reports diminished taste and hunger but denies nausea or vomiting as being the etiology of his poor appetite. He said his appetite began to decline about a month ago with subsequent weight loss as well. At this time, the patient is so weak that he can barely get out of bed or feed himself should he try. He says he wants to beat the cancer but doesn't think it will be possible given the extent of the disease and what the physicians have told him about his prognosis. The patient only has pain in his right abdomen when he moves but otherwise reports feeling comfortable.    Cassie is most worried about her  getting better.     PAST MEDICAL & SURGICAL HISTORY:  BPH (benign prostatic hyperplasia)  HLD (hyperlipidemia)  HTN (hypertension)  History of appendectomy    FAMILY HISTORY: possibly gallbladder cancer in his mother    ROS:  +weakness. -n/v/d/c, no pain                    Dyspnea (Neeraj 0-10): 2                       N/V: N                             Secretions N                Agitation:N   Pain: pt denies      General:  +weakness  HEENT:    no headache, dry throat, dysphagia, odynophagia  Neck:  Denied  CVS:  No CP, palpitations  Resp:  no SOB, no cough  GI:  mild abd pain with movement. +abd swelling. No n/v/d/c  :  no dysuria, hematuria  Musc:  weakness  Neuro:  no focal deficits  Psych:  none  Skin:  no rash  Lymph:  none    Allergies    erythromycin (Other)    Intolerances    Opiate Naive : Y  -iStop reviewed: Y | Reference #: [id number]    Medications:      MEDICATIONS  (STANDING):  atorvastatin 10 milliGRAM(s) Oral at bedtime  diltiazem    milliGRAM(s) Oral daily  diphenhydrAMINE   Injectable 25 milliGRAM(s) IV Push once  docusate sodium 100 milliGRAM(s) Oral two times a day  ferrous    sulfate 325 milliGRAM(s) Oral daily  fluticasone propionate 50 MICROgram(s)/spray Nasal Spray 1 Spray(s) Both Nostrils at bedtime  heparin  Injectable 5000 Unit(s) SubCutaneous every 8 hours  hydrochlorothiazide 12.5 milliGRAM(s) Oral daily  montelukast 10 milliGRAM(s) Oral at bedtime  multivitamin 1 Tablet(s) Oral daily  pantoprazole    Tablet 40 milliGRAM(s) Oral before breakfast  senna 2 Tablet(s) Oral at bedtime  tamsulosin 0.4 milliGRAM(s) Oral at bedtime    MEDICATIONS  (PRN):  benzocaine 15 mG/menthol 3.6 mG Lozenge 1 Lozenge Oral four times a day PRN Sore Throat  HYDROmorphone  Injectable 0.5 milliGRAM(s) IV Push every 4 hours PRN Severe Pain (7 - 10)  ondansetron Injectable 4 milliGRAM(s) IV Push every 6 hours PRN Nausea      Labs:    CBC:                        7.8    17.9  )-----------( 467      ( 2017 07:32 )             24.5     CMP:        135  |  93<L>  |  21  ----------------------------<  154<H>  3.7   |  28  |  1.18    Ca    8.0<L>      2017 07:32  Phos  2.7       Mg     2.2         TPro  5.6<L>  /  Alb  2.1<L>  /  TBili  2.6<H>  /  DBili  x   /  AST  75<H>  /  ALT  66<H>  /  AlkPhos  504<H>        Imaging: XAM:  CT CHEST                          PROCEDURE DATE:  2017              INTERPRETATION:  CT of the CHEST without intravenous contrast dated   2017 11:39 AM    I  COMPARISON: MRI of the abdomen dated November 15, 2017.    FINDINGS:     The heart is normal in size. There is no pericardial effusion. Small   mediastinal lymph nodes are present. There is no axillary   lymphadenopathy. There is a large right pleural effusion with underlying   compressive atelectasis. There are scattered groundglass opacities   throughout the left lung with associated mild bronchiectasis. There is a   6 mm pleural-based nodule in the left lower lobe on image 187 of series   4. There is a 4 mm pleural-based pulmonary nodule in the left lower lobe   on image 175 of series 4. There is a 4 mm pulmonary nodule in the lingula   on image 152 of series 4. There is a 3 mm nodule in the lingula on image   153 of series 4. There is mild left base atelectasis.    Evaluation of the upper abdomen again demonstrates multiple masses in the   liver. There is been interval placement of bilateral biliary stents.   There is a small amount of pneumobilia in the left hepatic lobe. The   pancreas is moderately atrophic. There is a small to moderate amount of   ascites in the upper abdomen.    There is no worrisome lytic or blastic osseous lesion.      IMPRESSION:  Large right pleural effusion with underlying compressive atelectasis.    Several subcentimeter pulmonary nodules throughout the left lung, as   detailed above. Metastatic disease should be considered.    Scattered groundglass opacities throughout the left lung, which may   represent an atypical inflammatory process or may represent small airways   disease.    Redemonstration of multiple masses in the liver with interval placement   of bilateral biliary stents. Small amount of pneumobilia in the left   hepatic lobe.    Small to moderate amount of ascites in the upper abdomen.    PEx:  T(C): 36.7 (17 @ 13:35), Max: 37.5 (17 @ 16:40)  HR: 93 (17 @ 13:35) (84 - 95)  BP: 117/71 (17 @ 13:35) (114/70 - 124/73)  RR: 17 (17 @ 13:35) (16 - 18)  SpO2: 96% (17 @ 13:35) (94% - 97%)  Wt(kg): --85.7      I&O's Summary    2017 07:01  -  2017 07:00  --------------------------------------------------------  IN: 120 mL / OUT: 200 mL / NET: -80 mL    2017 07:  -  2017 14:06  --------------------------------------------------------  IN: 120 mL / OUT: 400 mL / NET: -280 mL      Physical Exam:  General: lying comfortably in bed  HEENT: A/T N/C  MMM  Neck: supple  no JVD  CVS:  S1S2 tachycardic but regular  Resp: dyspneic with some use of accessory muscles but able to complete full sentences. +right chest tube draining serosanguinous fluid, no tenderness at insertion site. Bibasilar crackles R>L  GI:  distended, tympanic but non tender. hyperactive BS  :  did not examine  Musc:  no evidence of muscle atrophy  Neuro: no focal deficits,aox3  Psych:  calm and cooperative  Skin: anasarca  Lymph: no adenopathy  Preadmit Karnofsky:  60%           Current Karnofsky: 60%  Cachexia : No  BMI: 25.6    Advanced Directives:   Full Code  HCP       Decision maker: Patient   HCP: Cassie (wife) [phone]. Copy in chart    Social History: Patient lives with his wife Cassie in a one floor house with three steps to get through the front door. They have three children, two of which are estranged from the family. The remaining daughter lives in [place]. Patient's sister lives in [place] and has been speaking with wife about his health. 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    GOALS OF CARE DISCUSSION       Palliative care info/counseling provided	           Family meeting       Documentation of GOC: ongoing discussion with surgical oncologist and team	          REFERRALS  		        Palliative Med        Unit SW/Case Mgmt       Patient/Family Support       Massage Therapy       Music Therapy       Hospice       Nutrition       PT/OT JAVAN KEYES   [id number]     (1940):     HPI:  Patient is a 77 year old pleasant male with a PMHx of HTN, HLD, arrhythmia, and BPH who was admitted to [facility] on 11/15 at the request of his PCP for an abnormal CT. 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 ERCP was done , with sphincterotomy and placement of two metal stents in o the R and L hepatic ducts. Hospital course was complicated by melena that has since resolved and a R malignant pleural effusions now with chest tube. Palliative care was consulted as the patient is not a surgical or chemotherapy candidate.    The patient's wife and HCP Cassie was at bedside during the interview.     The patient's biggest concern is his lack of appetite. He reports diminished taste and hunger but denies nausea or vomiting as being the etiology of his poor appetite. He said his appetite began to decline about a month ago with subsequent weight loss as well. At this time, the patient is so weak that he can barely get out of bed or feed himself should he try. He says he wants to beat the cancer but doesn't think it will be possible given the extent of the disease and what the physicians have told him about his prognosis. The patient only has pain in his right abdomen when he moves but otherwise reports feeling comfortable.    Cassie is most worried about her  getting better.     PAST MEDICAL & SURGICAL HISTORY:  BPH (benign prostatic hyperplasia)  HLD (hyperlipidemia)  HTN (hypertension)  History of appendectomy    FAMILY HISTORY: possibly gallbladder cancer in his mother    ROS:  +weakness. -n/v/d/c, no pain                    Dyspnea (Neeraj 0-10): 2                       N/V: N                             Secretions N                Agitation:N   Pain: pt denies      General:  +weakness  HEENT:    no headache, dry throat, dysphagia, odynophagia  Neck:  Denied  CVS:  No CP, palpitations  Resp:  no SOB, no cough  GI:  mild abd pain with movement. +abd swelling. No n/v/d/c  :  no dysuria, hematuria  Musc:  weakness  Neuro:  no focal deficits  Psych:  none  Skin:  jaundice, no rash  Lymph:  none    Allergies    erythromycin (Other)    Intolerances    Opiate Naive : Y  -iStop reviewed: Y | Reference #: [id number]    Medications:      MEDICATIONS  (STANDING):  atorvastatin 10 milliGRAM(s) Oral at bedtime  diltiazem    milliGRAM(s) Oral daily  diphenhydrAMINE   Injectable 25 milliGRAM(s) IV Push once  docusate sodium 100 milliGRAM(s) Oral two times a day  ferrous    sulfate 325 milliGRAM(s) Oral daily  fluticasone propionate 50 MICROgram(s)/spray Nasal Spray 1 Spray(s) Both Nostrils at bedtime  heparin  Injectable 5000 Unit(s) SubCutaneous every 8 hours  hydrochlorothiazide 12.5 milliGRAM(s) Oral daily  montelukast 10 milliGRAM(s) Oral at bedtime  multivitamin 1 Tablet(s) Oral daily  pantoprazole    Tablet 40 milliGRAM(s) Oral before breakfast  senna 2 Tablet(s) Oral at bedtime  tamsulosin 0.4 milliGRAM(s) Oral at bedtime    MEDICATIONS  (PRN):  benzocaine 15 mG/menthol 3.6 mG Lozenge 1 Lozenge Oral four times a day PRN Sore Throat  HYDROmorphone  Injectable 0.5 milliGRAM(s) IV Push every 4 hours PRN Severe Pain (7 - 10)  ondansetron Injectable 4 milliGRAM(s) IV Push every 6 hours PRN Nausea      Labs:    CBC:                        7.8    17.9  )-----------( 467      ( 2017 07:32 )             24.5     CMP:        135  |  93<L>  |  21  ----------------------------<  154<H>  3.7   |  28  |  1.18    Ca    8.0<L>      2017 07:32  Phos  2.7       Mg     2.2         TPro  5.6<L>  /  Alb  2.1<L>  /  TBili  2.6<H>  /  DBili  x   /  AST  75<H>  /  ALT  66<H>  /  AlkPhos  504<H>        Imaging: XAM:  CT CHEST                          PROCEDURE DATE:  2017              INTERPRETATION:  CT of the CHEST without intravenous contrast dated   2017 11:39 AM    I  COMPARISON: MRI of the abdomen dated November 15, 2017.    FINDINGS:     The heart is normal in size. There is no pericardial effusion. Small   mediastinal lymph nodes are present. There is no axillary   lymphadenopathy. There is a large right pleural effusion with underlying   compressive atelectasis. There are scattered groundglass opacities   throughout the left lung with associated mild bronchiectasis. There is a   6 mm pleural-based nodule in the left lower lobe on image 187 of series   4. There is a 4 mm pleural-based pulmonary nodule in the left lower lobe   on image 175 of series 4. There is a 4 mm pulmonary nodule in the lingula   on image 152 of series 4. There is a 3 mm nodule in the lingula on image   153 of series 4. There is mild left base atelectasis.    Evaluation of the upper abdomen again demonstrates multiple masses in the   liver. There is been interval placement of bilateral biliary stents.   There is a small amount of pneumobilia in the left hepatic lobe. The   pancreas is moderately atrophic. There is a small to moderate amount of   ascites in the upper abdomen.    There is no worrisome lytic or blastic osseous lesion.      IMPRESSION:  Large right pleural effusion with underlying compressive atelectasis.    Several subcentimeter pulmonary nodules throughout the left lung, as   detailed above. Metastatic disease should be considered.    Scattered groundglass opacities throughout the left lung, which may   represent an atypical inflammatory process or may represent small airways   disease.    Redemonstration of multiple masses in the liver with interval placement   of bilateral biliary stents. Small amount of pneumobilia in the left   hepatic lobe.    Small to moderate amount of ascites in the upper abdomen.    PEx:  T(C): 36.7 (17 @ 13:35), Max: 37.5 (17 @ 16:40)  HR: 93 (17 @ 13:35) (84 - 95)  BP: 117/71 (17 @ 13:35) (114/70 - 124/73)  RR: 17 (17 @ 13:35) (16 - 18)  SpO2: 96% (17 @ 13:35) (94% - 97%)  Wt(kg): --85.7      I&O's Summary    2017 07:01  -  2017 07:00  --------------------------------------------------------  IN: 120 mL / OUT: 200 mL / NET: -80 mL    2017 07:  -  2017 14:06  --------------------------------------------------------  IN: 120 mL / OUT: 400 mL / NET: -280 mL      Physical Exam:  General: lying comfortably in bed  HEENT: A/T N/C  MMM  Neck: supple  no JVD  CVS:  S1S2 tachycardic but regular  Resp: dyspneic with some use of accessory muscles but able to complete full sentences. +right chest tube draining serosanguinous fluid, no tenderness at insertion site. Bibasilar crackles R>L  GI:  distended, tympanic but non tender. hyperactive BS  :  did not examine  Musc:  no evidence of muscle atrophy  Neuro: no focal deficits,aox3  Psych:  calm and cooperative  Skin: anasarca  Lymph: no adenopathy  Preadmit Karnofsky:  60%           Current Karnofsky: 60%  Cachexia : No  BMI: 25.6    Advanced Directives:   Full Code  HCP       Decision maker: Patient   HCP: Cassie (wife) [phone]. Copy in chart    Social History: Patient lives with his wife Cassie in a one floor house with three steps to get through the front door. They have three children, two of which are estranged from the family. The remaining daughter lives in [place]. Patient's sister lives in [place] and has been speaking with wife about his health. The patient is a former smoker, used to walk 20 minutes daily, identifies as [employer] but does not practice.    GOALS OF CARE DISCUSSION       Palliative care info/counseling provided	           Family meeting       Documentation of GOC: ongoing discussion with surgical oncologist and team	          REFERRALS  		        Palliative Med        Unit SW/Case Mgmt       Patient/Family Support       Massage Therapy       Music Therapy       Hospice       Nutrition       PT/OT

## 2017-11-28 NOTE — PROGRESS NOTE ADULT - SUBJECTIVE AND OBJECTIVE BOX
ON: ABd xray in AM to check for capsule  11/27: Bowel regimine added. Pulm unclamped chest tube, raina will pull if o/p low enough tomorrow, could not get OR time until thursday. Pallitave consult called. SHEEBA Duplex ordered.     STATUS POST:  Chest tube placement with US guidance  Insertion of right tunneled central venous catheter with port          Vital Signs Last 24 Hrs  T(C): 37.2 (28 Nov 2017 09:05), Max: 37.5 (27 Nov 2017 16:40)  T(F): 99 (28 Nov 2017 09:05), Max: 99.5 (27 Nov 2017 16:40)  HR: 84 (28 Nov 2017 09:05) (84 - 95)  BP: 120/72 (28 Nov 2017 09:05) (114/70 - 124/73)  BP(mean): --  RR: 17 (28 Nov 2017 09:05) (16 - 18)  SpO2: 96% (28 Nov 2017 09:05) (94% - 97%)    I&O's Summary    27 Nov 2017 07:01  -  28 Nov 2017 07:00  --------------------------------------------------------  IN: 120 mL / OUT: 200 mL / NET: -80 mL    28 Nov 2017 07:01  -  28 Nov 2017 11:45  --------------------------------------------------------  IN: 120 mL / OUT: 400 mL / NET: -280 mL        SUBJECTIVE: Pt seen and examined at bedside. no acute complaints.     Pain: [ ] YES [x ] NO  Pain (0-10):              Pain Control Adequate: [x ] YES [ ] NO  SOB: [ ]YES [x ] NO  Chest Discomfort: [ ] YES [x ] NO    Nausea: [ ] YES [x ] NO           Vomiting: [ ] YES [x ] NO  Flatus: [x ] YES [ ] NO             Bowel Movement: [x ] YES [ ] NO     Void: [x ]YES [ ]No    Physical Exam:  General Appearance: Appears well, NAD  Neck: Supple  Chest: Equal expansion bilaterally, equal breath sounds  CV: Pulse regular presently  Abdomen: Soft, nontense, non-tender  Extremities: Grossly symmetric, SCD's in place     LABS:                        7.8    17.9  )-----------( 467      ( 28 Nov 2017 07:32 )             24.5     11-28    135  |  93<L>  |  21  ----------------------------<  154<H>  3.7   |  28  |  1.18    Ca    8.0<L>      28 Nov 2017 07:32  Phos  2.7     11-28  Mg     2.2     11-28    TPro  5.6<L>  /  Alb  2.1<L>  /  TBili  2.6<H>  /  DBili  x   /  AST  75<H>  /  ALT  66<H>  /  AlkPhos  504<H>  11-28          RADIOLOGY & ADDITIONAL STUDIES:

## 2017-11-28 NOTE — CONSULT NOTE ADULT - PROBLEM SELECTOR RECOMMENDATION 3
Likely 2/2 LGIB. S/p colonoscopy revealing old blood but no active bleeding. Awaiting capsule study. H/H stable

## 2017-11-28 NOTE — CONSULT NOTE ADULT - PROBLEM SELECTOR RECOMMENDATION 5
Support provided to patient and family. Patient to have access to supportive services during rest of hospital stay as the patient/family deemed necessary ie. Chaplaincy, Massage therapy, Music therapy, Patient and family supportive services, Palliative SW, etc.  Reviewed patients PSSA screening  noting the patient would benefit from: music/massage therapy

## 2017-11-28 NOTE — PROGRESS NOTE ADULT - ASSESSMENT
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  - Carcinoma encasing and obstruction cystic duct and common hepatic duct, with intrahepatic ductal dilation.  - ERCP w/ stent placement  - LFTs mildly decreased compared to admission  - Liver bx shows adenocarcinoma compatible w/ metastatic cholangiocarcinoma  - palliative consult    2) LGIB  - Post ERCP  - Bowel prep 11/19.  - Colonoscopy 11/21  - pRBC x1 (11/21)  - No further episodes    3) R. Pleural effusion  - Chest tube placed (11/24). Immediate 1.3 L output  - Patient reports immediate relief  - Pulm will likely pull chest tube if o/p low    4) HTN  - diltiazem, HCTZ    5) BPH  - tamsulosin    6) HLD  - Atorvastatin 10 mg    7) Dispo: Hospice v home v [email or web address]

## 2017-11-28 NOTE — CONSULT NOTE ADULT - PROBLEM SELECTOR RECOMMENDATION 6
Began discussion re: ACP Pt states he has a living will  Wife will bring copy for chart Will discuss wishes in very near future Began discussion re: ACP Pt states he has a living will  Wife will bring copy for chart Will discuss wishes in very near future Request further discussion with surgical team re: treatment plan. Although pt states he understands that cancer is advanced, he does not appear to understand that there are no surgical or systemic options that might benefit him.     Discussed recs with resident surgery team 4

## 2017-11-28 NOTE — PROGRESS NOTE ADULT - ASSESSMENT
77yr old M with PMHx significant for GERD, HTN, Dyslipidemia, cardiac arrhythmia, Allergic rhinitis, BPH, who was sent to the ED by his outpatient doctors for worrisome findings on CT.    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  - sp MRCP with CHD and CBD strictures from cholangiocarcinoma with intraductal extension of tumor  - sp ERCP 11/16/17 - with sphincterotomy and placement of 2 uncovered metal stents into R and L hepatic ducts  - Liver biopsy consistent with metastatic cholangiocarcinoma  - Monitor daily LFTs  - Consider onc and palliative consult    # R pleural effusion  - s/p chest tube placement with improvement    # Anemia 2/2 likely UGIB  - sp colonoscopy 11/21/17 with evidence of old blood, but no active bleeding identified, his terminal ileum was intubated and this showed some old blood which could be due to small bowel bleed vs backwash from the colon  - VCE 11/23/17 - preliminary results shows oozing in the distal duodenum. At the end of the study, the capsule did not clear the small bowel.  - H/H stable; No further e/o GI bleeding (i.e melena, hematochezia, or hematemesis)  - Monitor H/H; transfuse if Hgb <7  - AXR shows capsule in the right pelvis; repeat Abd XR in 2-3 days  - Currently pt denying obstructive symptoms.   - Reviewed signs and symptoms of small bowel obstruction with the patient, including nausea, vomiting, abdominal bloating, obstipation. Pt was instructed to notify physician or nurse if these symptoms occur.  - C/w PPI daily  - patient currently not amenable to further endoscopic procedures  - Will consider repeat EGD/push enteroscopy if signs of recurrent GI bleeding  - please start on miralax daily    Case discussed with attending Dr. Bonner  GI will sign off for now, please reconsult us as needed

## 2017-11-28 NOTE — CONSULT NOTE ADULT - PROBLEM SELECTOR RECOMMENDATION 2
S/p chest tube placement by pulm, 2/2 mets. Patient denies pain or discomfort and reports significant improvement in breathing. Plans to pull tube vs pleur-X pending output.

## 2017-11-28 NOTE — CONSULT NOTE ADULT - PROBLEM SELECTOR RECOMMENDATION 9
Diagnosed this admission with mets to lung, liver and omentum. Followed by GI (Dr Rivera) and onc surg (Dr Baker). Patient deemed not a surgical or chemo candidate

## 2017-11-29 LAB
ALBUMIN SERPL ELPH-MCNC: 1.6 G/DL — LOW (ref 3.3–5)
ALP SERPL-CCNC: 468 U/L — HIGH (ref 40–120)
ALT FLD-CCNC: 59 U/L — HIGH (ref 10–45)
ANION GAP SERPL CALC-SCNC: 11 MMOL/L — SIGNIFICANT CHANGE UP (ref 5–17)
AST SERPL-CCNC: 59 U/L — HIGH (ref 10–40)
BILIRUB SERPL-MCNC: 2.2 MG/DL — HIGH (ref 0.2–1.2)
BUN SERPL-MCNC: 20 MG/DL — SIGNIFICANT CHANGE UP (ref 7–23)
CALCIUM SERPL-MCNC: 8.2 MG/DL — LOW (ref 8.4–10.5)
CHLORIDE SERPL-SCNC: 94 MMOL/L — LOW (ref 96–108)
CO2 SERPL-SCNC: 28 MMOL/L — SIGNIFICANT CHANGE UP (ref 22–31)
CREAT SERPL-MCNC: 1.19 MG/DL — SIGNIFICANT CHANGE UP (ref 0.5–1.3)
GLUCOSE SERPL-MCNC: 128 MG/DL — HIGH (ref 70–99)
HCT VFR BLD CALC: 24.4 % — LOW (ref 39–50)
HGB BLD-MCNC: 7.6 G/DL — LOW (ref 13–17)
MAGNESIUM SERPL-MCNC: 2.2 MG/DL — SIGNIFICANT CHANGE UP (ref 1.6–2.6)
MCHC RBC-ENTMCNC: 29.8 PG — SIGNIFICANT CHANGE UP (ref 27–34)
MCHC RBC-ENTMCNC: 31.1 G/DL — LOW (ref 32–36)
MCV RBC AUTO: 95.7 FL — SIGNIFICANT CHANGE UP (ref 80–100)
NON-GYNECOLOGICAL CYTOLOGY STUDY: SIGNIFICANT CHANGE UP
PHOSPHATE SERPL-MCNC: 2.7 MG/DL — SIGNIFICANT CHANGE UP (ref 2.5–4.5)
PLATELET # BLD AUTO: 455 K/UL — HIGH (ref 150–400)
POTASSIUM SERPL-MCNC: 4 MMOL/L — SIGNIFICANT CHANGE UP (ref 3.5–5.3)
POTASSIUM SERPL-SCNC: 4 MMOL/L — SIGNIFICANT CHANGE UP (ref 3.5–5.3)
PROT SERPL-MCNC: 5.7 G/DL — LOW (ref 6–8.3)
RBC # BLD: 2.55 M/UL — LOW (ref 4.2–5.8)
RBC # FLD: 17.8 % — HIGH (ref 10.3–16.9)
SODIUM SERPL-SCNC: 133 MMOL/L — LOW (ref 135–145)
WBC # BLD: 17.5 K/UL — HIGH (ref 3.8–10.5)
WBC # FLD AUTO: 17.5 K/UL — HIGH (ref 3.8–10.5)

## 2017-11-29 PROCEDURE — 99233 SBSQ HOSP IP/OBS HIGH 50: CPT

## 2017-11-29 PROCEDURE — 71010: CPT | Mod: 26

## 2017-11-29 PROCEDURE — 99232 SBSQ HOSP IP/OBS MODERATE 35: CPT | Mod: GC

## 2017-11-29 RX ORDER — HYDROMORPHONE HYDROCHLORIDE 2 MG/ML
0.5 INJECTION INTRAMUSCULAR; INTRAVENOUS; SUBCUTANEOUS EVERY 4 HOURS
Qty: 0 | Refills: 0 | Status: DISCONTINUED | OUTPATIENT
Start: 2017-11-29 | End: 2017-12-05

## 2017-11-29 RX ORDER — SODIUM CHLORIDE 9 MG/ML
1000 INJECTION, SOLUTION INTRAVENOUS
Qty: 0 | Refills: 0 | Status: DISCONTINUED | OUTPATIENT
Start: 2017-11-29 | End: 2017-12-01

## 2017-11-29 RX ADMIN — MONTELUKAST 10 MILLIGRAM(S): 4 TABLET, CHEWABLE ORAL at 21:17

## 2017-11-29 RX ADMIN — PANTOPRAZOLE SODIUM 40 MILLIGRAM(S): 20 TABLET, DELAYED RELEASE ORAL at 07:18

## 2017-11-29 RX ADMIN — SENNA PLUS 2 TABLET(S): 8.6 TABLET ORAL at 21:17

## 2017-11-29 RX ADMIN — HEPARIN SODIUM 5000 UNIT(S): 5000 INJECTION INTRAVENOUS; SUBCUTANEOUS at 05:42

## 2017-11-29 RX ADMIN — Medication 100 MILLIGRAM(S): at 14:44

## 2017-11-29 RX ADMIN — BENZOCAINE AND MENTHOL 1 LOZENGE: 5; 1 LIQUID ORAL at 21:17

## 2017-11-29 RX ADMIN — Medication 1 TABLET(S): at 12:36

## 2017-11-29 RX ADMIN — HEPARIN SODIUM 5000 UNIT(S): 5000 INJECTION INTRAVENOUS; SUBCUTANEOUS at 14:44

## 2017-11-29 RX ADMIN — Medication 100 MILLIGRAM(S): at 05:43

## 2017-11-29 RX ADMIN — Medication 2.5 MILLIGRAM(S): at 07:18

## 2017-11-29 RX ADMIN — BENZOCAINE AND MENTHOL 1 LOZENGE: 5; 1 LIQUID ORAL at 14:44

## 2017-11-29 RX ADMIN — Medication 325 MILLIGRAM(S): at 12:36

## 2017-11-29 RX ADMIN — Medication 12.5 MILLIGRAM(S): at 05:43

## 2017-11-29 RX ADMIN — ATORVASTATIN CALCIUM 10 MILLIGRAM(S): 80 TABLET, FILM COATED ORAL at 21:21

## 2017-11-29 RX ADMIN — HEPARIN SODIUM 5000 UNIT(S): 5000 INJECTION INTRAVENOUS; SUBCUTANEOUS at 21:17

## 2017-11-29 RX ADMIN — TAMSULOSIN HYDROCHLORIDE 0.4 MILLIGRAM(S): 0.4 CAPSULE ORAL at 21:17

## 2017-11-29 RX ADMIN — SODIUM CHLORIDE 100 MILLILITER(S): 9 INJECTION, SOLUTION INTRAVENOUS at 23:30

## 2017-11-29 RX ADMIN — Medication 180 MILLIGRAM(S): at 05:43

## 2017-11-29 NOTE — PROGRESS NOTE ADULT - PROBLEM SELECTOR PLAN 5
Support provided to patient and family. Patient to have access to supportive services during rest of hospital stay as the patient/family deemed necessary ie. Chaplaincy, Massage therapy, Music therapy, Patient and family supportive services, Palliative SW, etc.  Reviewed patients PSSA screening  noting the patient would benefit from: music/massage therapy.

## 2017-11-29 NOTE — PROGRESS NOTE ADULT - PROBLEM SELECTOR PLAN 6
Began discussion re: ACP Pt states he has a living will  Wife will bring copy for chart Will discuss wishes in very near future Request further discussion with surgical team re: treatment plan. Although pt states he understands that cancer is advanced, he does not appear to understand that there are no surgical or systemic options that might benefit him. Meeting with patient, wife, Dr Baker and palliative team tomorrow    Discussed recs with resident surgery team 4.

## 2017-11-29 NOTE — PROGRESS NOTE ADULT - SUBJECTIVE AND OBJECTIVE BOX
JAVAN KEYES   MRN-[id number]    CC: [facility], symptom management    HPI:  Patient is a 77 year old pleasant male with a PMHx of HTN, HLD, arrhythmia, and BPH who was admitted to [facility] on 11/15 at the request of his PCP for an abnormal CT. 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 ERCP was done 11/16, with sphincterotomy and placement of two metal stents in the R and L hepatic ducts. Hospital course was complicated by melena that has since resolved and a R malignant pleural effusions now with chest tube. Palliative care was consulted as the patient is not a surgical or chemotherapy candidate.    The patient's wife and HCP Cassie was at bedside during the interview.     The patient's biggest concern is his lack of appetite. He reports diminished taste and hunger but denies nausea or vomiting as being the etiology of his poor appetite. He said his appetite began to decline about a month ago with subsequent weight loss as well. At this time, the patient is so weak that he can barely get out of bed or feed himself should he try. He says he wants to beat the cancer but doesn't think it will be possible given the extent of the disease and what the physicians have told him about his prognosis. The patient only has pain in his right abdomen when he moves but otherwise reports feeling comfortable.    Today the patient reports slightly more difficulty breathing now that the chest tube was removed. He states that there are plans for a PleurX tomorrow morning. Patient also reports similar abdomen pain as yesterday. He was able to eat half a slice of [place] toast this morning.      DYSPNEA: Y   NAUS/VOM: N	  SECRETIONS:  N	  AGITATION: N  Pain N         General:  +weakness  HEENT:    no headache, dry throat, dysphagia, odynophagia  Neck:  Denied  CVS:  No CP, palpitations  Resp:  no SOB, no cough  GI:  mild abd pain with movement. +abd swelling. No n/v/d/c  :  no dysuria, hematuria  Musc:  weakness  Neuro:  no focal deficits  Psych:  none  Skin:  jaundice, no rash  Lymph:  none      PEx:  T(C): 37 (11-29-17 @ 13:46), Max: 37.3 (11-28-17 @ 20:53)  HR: 80 (11-29-17 @ 14:02) (80 - 98)  BP: 134/72 (11-29-17 @ 14:02) (115/71 - 134/72)  RR: 17 (11-29-17 @ 13:46) (16 - 17)  SpO2: 98% (11-29-17 @ 14:02) (96% - 98%)  Wt(kg): --    Physical Exam:  General: lying comfortably in bed  HEENT: A/T N/C  MMM  Neck: supple  no JVD  CVS:  S1S2 tachycardic but regular  Resp: dyspneic with some use of accessory muscles but able to complete full sentences. s/p removal of right chest tube  Bibasilar crackles R>L  GI:  distended, tympanic but non tender. hyperactive BS  :  did not examine  Musc:  no evidence of muscle atrophy  Neuro: no focal deficits,aox3  Psych:  calm and cooperative  Skin: anasarca  Lymph: no adenopathy  Preadmit Karnofsky:  60%           Current Karnofsky: 60%  Cachexia : No  BMI: 25.6    iSTOP REVIEWED Y Reference #: [id number]    MEDICATIONS: REVIEWED  MEDICATIONS  (STANDING):  atorvastatin 10 milliGRAM(s) Oral at bedtime  diltiazem    milliGRAM(s) Oral daily  diphenhydrAMINE   Injectable 25 milliGRAM(s) IV Push once  docusate sodium 100 milliGRAM(s) Oral two times a day  dronabinol 2.5 milliGRAM(s) Oral before breakfast  ferrous    sulfate 325 milliGRAM(s) Oral daily  fluticasone propionate 50 MICROgram(s)/spray Nasal Spray 1 Spray(s) Both Nostrils at bedtime  heparin  Injectable 5000 Unit(s) SubCutaneous every 8 hours  hydrochlorothiazide 12.5 milliGRAM(s) Oral daily  montelukast 10 milliGRAM(s) Oral at bedtime  multivitamin 1 Tablet(s) Oral daily  pantoprazole    Tablet 40 milliGRAM(s) Oral before breakfast  senna 2 Tablet(s) Oral at bedtime  tamsulosin 0.4 milliGRAM(s) Oral at bedtime    MEDICATIONS  (PRN):  benzocaine 15 mG/menthol 3.6 mG Lozenge 1 Lozenge Oral four times a day PRN Sore Throat  HYDROmorphone  Injectable 0.5 milliGRAM(s) IV Push every 4 hours PRN Severe Pain (7 - 10)  ondansetron Injectable 4 milliGRAM(s) IV Push every 6 hours PRN Nausea      LABS: REVIEWED                        7.6    17.5  )-----------( 455      ( 29 Nov 2017 06:21 )             24.4   Comprehensive Metabolic Panel (11.29.17 @ 06:21)    Sodium, Serum: 133 mmol/L    Potassium, Serum: 4.0 mmol/L    Chloride, Serum: 94 mmol/L    Carbon Dioxide, Serum: 28 mmol/L    Anion Gap, Serum: 11 mmol/L    Blood Urea Nitrogen, Serum: 20 mg/dL    Creatinine, Serum: 1.19 mg/dL    Glucose, Serum: 128 mg/dL    Calcium, Total Serum: 8.2 mg/dL    Protein Total, Serum: 5.7 g/dL    Albumin, Serum: 1.6 g/dL    Bilirubin Total, Serum: 2.2 mg/dL    Alkaline Phosphatase, Serum: 468 U/L    Aspartate Aminotransferase (AST/SGOT): 59 U/L    Alanine Aminotransferase (ALT/SGPT): 59 U/L    eGFR if Non : 58: The units for eGFR are ml/min/1.73m2 (normalized body surface area). The  eGFR is calculated from a serum creatinine using the CKD-EPI equation.  Other variables required for calculation are race, age and sex. Among  patients with chronic kidney disease (CKD), the eGFR is useful in  determining the stage of disease according to KDOQI CKD classification.  All eGFR results are reported numerically with the following  interpretation.          GFR                    With                        Without     (ml/min/1.73 m2)    Kidney Damage       Kidney Damage        >= 90                    Stage 1                     Normal        60-89                    Stage 2                     Decreased GFR        30-59                    Stage 3         Stage 3        15-29                    Stage 4                     Stage 4        < 15                      Stage 5                     Stage 5  Each stage of CKD assumes that the associated GFR level has been in  effect for at least 3 months. Determination of stages one and two (with  eGFR > 59 ml/min/m2) requires estimation of kidney damage for at least 3  months as defined by structural or functional abnormalities.  Limitations: All estimates of GFR will be less accurate for patientsat  extremes of muscle mass (including but not limited to frail elderly,  critically ill, or cancer patients), those with unusual diets, and those  with conditions associated with reduced secretion or extrarenal  elimination of creatinine. The eGFR equation is not recommended for use  in patients with unstable creatinine levels. mL/min/1.73M2    eGFR if : 67: The units for eGFR are ml/min/1.73m2 (normalized body surface area). The  eGFR is calculated from a serum creatinine using the CKD-EPI equation.  Other variables required for calculation are race, age and sex. Among  patients with chronic kidney disease (CKD), the eGFR is useful in  determining the stage of disease according to KDOQI CKD classification.  All eGFR results are reported numerically with the following  interpretation.          GFR                    With                        Without     (ml/min/1.73 m2)    Kidney Damage       Kidney Damage        >= 90                    Stage 1                     Normal        60-89                    Stage 2                     Decreased GFR        30-59                    Stage 3         Stage 3        15-29                    Stage 4                     Stage 4        < 15                      Stage 5                     Stage 5  Each stage of CKD assumes that the associated GFR level has been in  effect for at least 3 months. Determination of stages one and two (with  eGFR > 59 ml/min/m2) requires estimation of kidney damage for at least 3  months as defined by structural or functional abnormalities.  Limitations: All estimates of GFR will be less accurate for patientsat  extremes of muscle mass (including but not limited to frail elderly,  critically ill, or cancer patients), those with unusual diets, and those  with conditions associated with reduced secretion or extrarenal  elimination of creatinine. The eGFR equation is not recommended for use  in patients with unstable creatinine levels. mL/min/1.73M2      IMAGING: REVIEWED   CT CHEST                          PROCEDURE DATE:  11/20/2017              INTERPRETATION:  CT of the CHEST without intravenous contrast dated   11/20/2017 11:39 AM    I  COMPARISON: MRI of the abdomen dated November 15, 2017.    FINDINGS:     The heart is normal in size. There is no pericardial effusion. Small   mediastinal lymph nodes are present. There is no axillary   lymphadenopathy. There is a large right pleural effusion with underlying   compressive atelectasis. There are scattered groundglass opacities   throughout the left lung with associated mild bronchiectasis. There is a   6 mm pleural-based nodule in the left lower lobe on image 187 of series   4. There is a 4 mm pleural-based pulmonary nodule in the left lower lobe   on image 175 of series 4. There is a 4 mm pulmonary nodule in the lingula   on image 152 of series 4. There is a 3 mm nodule in the lingula on image   153 of series 4. There is mild left base atelectasis.    Evaluation of the upper abdomen again demonstrates multiple masses in the   liver. There is been interval placement of bilateral biliary stents.   There is a small amount of pneumobilia in the left hepatic lobe. The   pancreas is moderately atrophic. There is a small to moderate amount of   ascites in the upper abdomen.    There is no worrisome lytic or blastic osseous lesion.      IMPRESSION:  Large right pleural effusion with underlying compressive atelectasis.    Several subcentimeter pulmonary nodules throughout the left lung, as   detailed above. Metastatic disease should be considered.    Scattered groundglass opacities throughout the left lung, which may   represent an atypical inflammatory process or may represent small airways   disease.    Redemonstration of multiple masses in the liver with interval placement   of bilateral biliary stents. Small amount of pneumobilia in the left   hepatic lobe.    Small to moderate amount of ascites in the upper abdomen.    Advanced Directives:  Full Code      Decision maker: Patient   HCP: Cassie (wife) [phone]. Copy in chart    Social History: Patient lives with his wife Cassie in a one floor house with three steps to get through the front door. They have three children, two of which are estranged from the family. The remaining daughter lives in [place]. Patient's sister lives in [place] and has been speaking with wife about his health. The patient is a former smoker, used to walk 20 minutes daily, identifies as [employer] but does not practice.    GOALS OF CARE DISCUSSION       Palliative care info/counseling provided	           Family meeting       Documentation of GOC: ongoing discussion with surgical oncologist and team	          REFERRALS  		        Palliative Med        Unit SW/Case Mgmt       Patient/Family Support       Massage Therapy       Music Therapy       Hospice       Nutrition       PT/OT    REFERRALS	        Palliative Med        Unit SW/Case Mgmt              Speech/Swallow       Patient/Family Support       Massage Therapy       Music Therapy       Hospice       Nutrition       Ethics       PT/OT JAVAN KEYES   MRN-[id number]    CC: [facility], symptom management    HPI:  Patient is a 77 year old pleasant male with a PMHx of HTN, HLD, arrhythmia, and BPH who was admitted to [facility] on 11/15 at the request of his PCP for an abnormal CT. 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 ERCP was done 11/16, with sphincterotomy and placement of two metal stents in the R and L hepatic ducts. Hospital course was complicated by melena that has since resolved and a R malignant pleural effusions now with chest tube. Palliative care was consulted as the patient is not a surgical or chemotherapy candidate.    The patient's wife and HCP Cassie was at bedside during the interview.     The patient's biggest concern is his lack of appetite. He reports diminished taste and hunger but denies nausea or vomiting as being the etiology of his poor appetite. He said his appetite began to decline about a month ago with subsequent weight loss as well. At this time, the patient is so weak that he can barely get out of bed or feed himself should he try. He says he wants to beat the cancer but doesn't think it will be possible given the extent of the disease and what the physicians have told him about his prognosis. The patient only has pain in his right abdomen when he moves but otherwise reports feeling comfortable.    Today the patient reports slightly more difficulty breathing now that the chest tube was removed. He states that there are plans for a PleurX tomorrow morning. Patient also reports similar abdomen pain as yesterday. He was able to eat half a slice of [place] toast this morning.      DYSPNEA: Y   NAUS/VOM: N	  SECRETIONS:  N	  AGITATION: N  Pain: pt denies        General:  +weakness  HEENT:    no headache, dry throat, dysphagia, odynophagia  Neck:  Denied  CVS:  No CP, palpitations  Resp:  no SOB, no cough  GI:  mild abd pain with movement. +abd swelling. No n/v/d/c  :  no dysuria, hematuria  Musc:  weakness  Neuro:  no focal deficits  Psych:  none  Skin:  jaundice, no rash  Lymph:  none      PEx:  T(C): 37 (11-29-17 @ 13:46), Max: 37.3 (11-28-17 @ 20:53)  HR: 80 (11-29-17 @ 14:02) (80 - 98)  BP: 134/72 (11-29-17 @ 14:02) (115/71 - 134/72)  RR: 17 (11-29-17 @ 13:46) (16 - 17)  SpO2: 98% (11-29-17 @ 14:02) (96% - 98%)    Physical Exam:  General: lying comfortably in bed  HEENT: A/T N/C  MMM  Neck: supple  no JVD  CVS:  S1S2 tachycardic but regular  Resp: dyspneic with some use of accessory muscles, able to complete full sentences. s/p removal of right chest tube  Bibasilar crackles R>L  GI:  distended, tympanic,  non tender. hyperactive BS  :  did not examine  Musc:  no evidence of muscle atrophy  Neuro: no focal deficits,aox3  Psych:  calm and cooperative  Skin: anasarca  Lymph: no adenopathy  Preadmit Karnofsky:  60%           Current Karnofsky: 60%  Cachexia : No  BMI: 25.6    iSTOP REVIEWED Y Reference #: [id number]    MEDICATIONS: REVIEWED  MEDICATIONS  (STANDING):  atorvastatin 10 milliGRAM(s) Oral at bedtime  diltiazem    milliGRAM(s) Oral daily  diphenhydrAMINE   Injectable 25 milliGRAM(s) IV Push once  docusate sodium 100 milliGRAM(s) Oral two times a day  dronabinol 2.5 milliGRAM(s) Oral before breakfast  ferrous    sulfate 325 milliGRAM(s) Oral daily  fluticasone propionate 50 MICROgram(s)/spray Nasal Spray 1 Spray(s) Both Nostrils at bedtime  heparin  Injectable 5000 Unit(s) SubCutaneous every 8 hours  hydrochlorothiazide 12.5 milliGRAM(s) Oral daily  montelukast 10 milliGRAM(s) Oral at bedtime  multivitamin 1 Tablet(s) Oral daily  pantoprazole    Tablet 40 milliGRAM(s) Oral before breakfast  senna 2 Tablet(s) Oral at bedtime  tamsulosin 0.4 milliGRAM(s) Oral at bedtime    MEDICATIONS  (PRN):  benzocaine 15 mG/menthol 3.6 mG Lozenge 1 Lozenge Oral four times a day PRN Sore Throat  HYDROmorphone  Injectable 0.5 milliGRAM(s) IV Push every 4 hours PRN Severe Pain (7 - 10)  ondansetron Injectable 4 milliGRAM(s) IV Push every 6 hours PRN Nausea      LABS: REVIEWED                        7.6    17.5  )-----------( 455      ( 29 Nov 2017 06:21 )             24.4     11-29    133<L>  |  94<L>  |  20  ----------------------------<  128<H>  4.0   |  28  |  1.19    Ca    8.2<L>      29 Nov 2017 06:21  Phos  2.7     11-29  Mg     2.2     11-29    TPro  5.7<L>  /  Alb  1.6<L>  /  TBili  2.2<H>  /  DBili  x   /  AST  59<H>  /  ALT  59<H>  /  AlkPhos  468<H>  11-29    IMAGING: REVIEWED   CT CHEST                          PROCEDURE DATE:  11/20/2017          INTERPRETATION:  CT of the CHEST without intravenous contrast dated   11/20/2017 11:39 AM    ICOMPARISON: MRI of the abdomen dated November 15, 2017.    FINDINGS:     The heart is normal in size. There is no pericardial effusion. Small   mediastinal lymph nodes are present. There is no axillary   lymphadenopathy. There is a large right pleural effusion with underlying   compressive atelectasis. There are scattered groundglass opacities   throughout the left lung with associated mild bronchiectasis. There is a   6 mm pleural-based nodule in the left lower lobe on image 187 of series   4. There is a 4 mm pleural-based pulmonary nodule in the left lower lobe   on image 175 of series 4. There is a 4 mm pulmonary nodule in the lingula   on image 152 of series 4. There is a 3 mm nodule in the lingula on image   153 of series 4. There is mild left base atelectasis.    Evaluation of the upper abdomen again demonstrates multiple masses in the   liver. There is been interval placement of bilateral biliary stents.   There is a small amount of pneumobilia in the left hepatic lobe. The   pancreas is moderately atrophic. There is a small to moderate amount of   ascites in the upper abdomen.    There is no worrisome lytic or blastic osseous lesion.      IMPRESSION:  Large right pleural effusion with underlying compressive atelectasis.    Several subcentimeter pulmonary nodules throughout the left lung, as   detailed above. Metastatic disease should be considered.    Scattered groundglass opacities throughout the left lung, which may   represent an atypical inflammatory process or may represent small airways   disease.    Redemonstration of multiple masses in the liver with interval placement   of bilateral biliary stents. Small amount of pneumobilia in the left   hepatic lobe.    Small to moderate amount of ascites in the upper abdomen.    Advanced Directives:  Full Code      Decision maker: Patient   HCP: Cassie (wife) [phone]. Copy in chart    GOALS OF CARE DISCUSSION       Palliative care info/counseling provided	           Documentation of GOC: ongoing discussion with surgical oncologist and team	          REFERRALS  		        Palliative Med        Unit SW/Case Mgmt       Patient/Family Support       Massage Therapy       Music Therapy       Hospice       Nutrition       PT/OT

## 2017-11-29 NOTE — PROGRESS NOTE ADULT - SUBJECTIVE AND OBJECTIVE BOX
Interval Events:  Assessed chest tube site yesterday and noted chest tube was pulled out of the chest wall. Sutures removed and occlusive dressing applied.     Subjective  Pt seen and examined at bedside today with no respiratory complaints. States he was able to sleep flat last night, has not noticed increased SOB/cough/phelgm. Still has difficulty working with PT as his blood pressure drops too low and he gets very fatigued.         MEDICATIONS:  Pulmonary:  diphenhydrAMINE   Injectable 25 milliGRAM(s) IV Push once  montelukast 10 milliGRAM(s) Oral at bedtime    Antimicrobials:    Anticoagulants:  heparin  Injectable 5000 Unit(s) SubCutaneous every 8 hours    Cardiac:  diltiazem    milliGRAM(s) Oral daily  hydrochlorothiazide 12.5 milliGRAM(s) Oral daily  tamsulosin 0.4 milliGRAM(s) Oral at bedtime      Allergies    erythromycin (Other)    Intolerances        Vital Signs Last 24 Hrs  T(C): 36.9 (29 Nov 2017 05:46), Max: 37.3 (28 Nov 2017 20:53)  T(F): 98.4 (29 Nov 2017 05:46), Max: 99.2 (28 Nov 2017 20:53)  HR: 85 (29 Nov 2017 05:46) (85 - 98)  BP: 121/76 (29 Nov 2017 05:46) (117/71 - 129/78)  BP(mean): --  RR: 16 (29 Nov 2017 05:46) (16 - 17)  SpO2: 98% (29 Nov 2017 05:46) (96% - 98%)    11-28 @ 07:01  -  11-29 @ 07:00  --------------------------------------------------------  IN: 340 mL / OUT: 701 mL / NET: -361 mL          PHYSICAL EXAM:    General: No acute distress, laying comfortably in bed on nasal cannula  Eyes: PERRL, EOM intact; conjunctiva and sclera clear  Respiratory: Decreased breath sounds at bases, R>L, up to scapula on R  Cardiovascular: Regular rate and rhythm. S1 and S2 Normal; No murmurs, gallops or rubs  Extremities: improved 1+ pitting edema  Neurological: Alert and oriented x3  Skin: Warm and dry. No obvious rash    LABS:      CBC Full  -  ( 29 Nov 2017 06:21 )  WBC Count : 17.5 K/uL  Hemoglobin : 7.6 g/dL  Hematocrit : 24.4 %  Platelet Count - Automated : 455 K/uL  Mean Cell Volume : 95.7 fL  Mean Cell Hemoglobin : 29.8 pg  Mean Cell Hemoglobin Concentration : 31.1 g/dL      11-29    133<L>  |  94<L>  |  20  ----------------------------<  128<H>  4.0   |  28  |  1.19    Ca    8.2<L>      29 Nov 2017 06:21  Phos  2.7     11-29  Mg     2.2     11-29    TPro  5.7<L>  /  Alb  1.6<L>  /  TBili  2.2<H>  /  DBili  x   /  AST  59<H>  /  ALT  59<H>  /  AlkPhos  468<H>  11-29      RADIOLOGY & ADDITIONAL STUDIES (The following images were personally reviewed):  CXR (11/29): Increased moderate right sided pleural effusion, possibly loculated; no pneumothorax    Bedside US of lung: Moderate loculated pleural effusion on the right

## 2017-11-29 NOTE — PROGRESS NOTE ADULT - PROBLEM SELECTOR PLAN 1
Large pleural effusion on the R that is likely a malignant effusion, cytology received but still pending, s/p chest tube placement 11/24 with 3.5L of drainage  After clamping chest tube, pt had no output for >36 hours, though development of small effusion on CXR 11/28 - when chest tube inspected it was noted to be pulled completely out which likely explains cessation of output.   On subsequent repeat images, pleural effusion appears to be reaccumulating. While patient remains asymptomatic, he is also mostly bed bound with minimal exertion and extremely deconditioned. Suspect that effusion will continue to increase and pt will become symptomatic. Also, as it is now determined that pt is unlikely to be a candidate for chemotherapy, the underlying cause of the pleural effusion will persist increasing likelihood of recurrence. Given he had such significant subjective improvement from original pleural effusion drainage, will plan for PleurX catheter placement tomorrow morning (730) with Dr. Peterson for palliative relief of a likely malignant effusion  -NPO after midnight, please check coags in the AM, active T&S  - OOBTC, incentive spirometry, PT  - Dispo per primary team

## 2017-11-29 NOTE — PROGRESS NOTE ADULT - SUBJECTIVE AND OBJECTIVE BOX
ON: NO PTX on CXR - cont pleural effusion  11/28: Palitive care meeting. Rec Maranol 2.5 with breakfast, increase if toerated for 2-3 days to with breakfast and lunch. LE Duplex negitive. Still on OR schedule for Plurex on thursday. Chest tube out today ON: NO PTX on CXR - cont pleural effusion  11/28: Palitive care meeting. Rec Maranol 2.5 with breakfast, increase if toerated for 2-3 days to with breakfast and lunch. LE Duplex negitive. Still on OR schedule for Plurex on thursday. Chest tube out today    STATUS POST:  Chest tube placement with US guidance  Insertion of right tunneled central venous catheter with port          Vital Signs Last 24 Hrs  T(C): 37 (29 Nov 2017 10:00), Max: 37.3 (28 Nov 2017 20:53)  T(F): 98.6 (29 Nov 2017 10:00), Max: 99.2 (28 Nov 2017 20:53)  HR: 80 (29 Nov 2017 10:00) (80 - 98)  BP: 124/76 (29 Nov 2017 10:00) (117/71 - 129/78)  BP(mean): --  RR: 17 (29 Nov 2017 10:00) (16 - 17)  SpO2: 98% (29 Nov 2017 10:00) (96% - 98%)    I&O's Summary    28 Nov 2017 07:01  -  29 Nov 2017 07:00  --------------------------------------------------------  IN: 340 mL / OUT: 701 mL / NET: -361 mL        SUBJECTIVE: Pt seen and examined at bedside. No acute complaints.     Pain: [ ] YES [x ] NO  Pain (0-10):              Pain Control Adequate: [x ] YES [ ] NO  SOB: [ ]YES [x ] NO  Chest Discomfort: [ ] YES [x ] NO    Nausea: [ ] YES [x ] NO           Vomiting: [ ] YES [x ] NO  Flatus: [ x] YES [ ] NO             Bowel Movement: [x ] YES [ ] NO     Void: [x ]YES [ ]No    Physical Exam:  General Appearance: Appears well, NAD  Neck: Supple  Chest: Equal expansion bilaterally on NC @ 2lpm  CV: Pulse regular presently  Abdomen: Soft, nontense, nontender.  Extremities: Grossly symmetric, SCD's in place     LABS:                        7.6    17.5  )-----------( 455      ( 29 Nov 2017 06:21 )             24.4     11-29    133<L>  |  94<L>  |  20  ----------------------------<  128<H>  4.0   |  28  |  1.19    Ca    8.2<L>      29 Nov 2017 06:21  Phos  2.7     11-29  Mg     2.2     11-29    TPro  5.7<L>  /  Alb  1.6<L>  /  TBili  2.2<H>  /  DBili  x   /  AST  59<H>  /  ALT  59<H>  /  AlkPhos  468<H>  11-29          RADIOLOGY & ADDITIONAL STUDIES:

## 2017-11-29 NOTE — PROGRESS NOTE ADULT - ASSESSMENT
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

## 2017-11-29 NOTE — PROGRESS NOTE ADULT - ASSESSMENT
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  - Carcinoma encasing and obstruction cystic duct and common hepatic duct, with intrahepatic ductal dilation.  - ERCP w/ stent placement  - LFTs mildly decreased compared to admission  - Liver bx shows adenocarcinoma compatible w/ metastatic cholangiocarcinoma  - palliative consult    2) LGIB  - Post ERCP  - Bowel prep 11/19.  - Colonoscopy 11/21  - pRBC x1 (11/21)  - No further episodes    3) R. Pleural effusion  - Chest tube (11/24-28). Immediate 1.3 L output  - Patient reports immediate relief  - For pleurx on Thursday    4) HTN  - diltiazem, HCTZ    5) BPH  - tamsulosin    6) HLD  - Atorvastatin 10 mg    7) Dispo: Hospice v home v [id number]

## 2017-11-29 NOTE — PROGRESS NOTE ADULT - PROBLEM SELECTOR PLAN 1
Diagnosed this admission with mets to lung, liver and omentum. Followed by GI (Dr Rivera) and onc surg (Dr Baker). Patient deemed not a surgical or chemo candidate, however has not been told by primary team. As per surgery resident, plan for Dr Baker and palliative team to discuss prognosis together with patient and wife tomorrow.

## 2017-11-30 LAB
ALBUMIN SERPL ELPH-MCNC: 1.8 G/DL — LOW (ref 3.3–5)
ALP SERPL-CCNC: 436 U/L — HIGH (ref 40–120)
ALT FLD-CCNC: 52 U/L — HIGH (ref 10–45)
ANION GAP SERPL CALC-SCNC: 11 MMOL/L — SIGNIFICANT CHANGE UP (ref 5–17)
APTT BLD: 28.9 SEC — SIGNIFICANT CHANGE UP (ref 27.5–37.4)
AST SERPL-CCNC: 53 U/L — HIGH (ref 10–40)
B PERT IGG+IGM PNL SER: SIGNIFICANT CHANGE UP
BILIRUB SERPL-MCNC: 2 MG/DL — HIGH (ref 0.2–1.2)
BLD GP AB SCN SERPL QL: NEGATIVE — SIGNIFICANT CHANGE UP
BUN SERPL-MCNC: 18 MG/DL — SIGNIFICANT CHANGE UP (ref 7–23)
CALCIUM SERPL-MCNC: 8 MG/DL — LOW (ref 8.4–10.5)
CHLORIDE SERPL-SCNC: 90 MMOL/L — LOW (ref 96–108)
CO2 SERPL-SCNC: 28 MMOL/L — SIGNIFICANT CHANGE UP (ref 22–31)
COLOR FLD: YELLOW — SIGNIFICANT CHANGE UP
CREAT SERPL-MCNC: 1.16 MG/DL — SIGNIFICANT CHANGE UP (ref 0.5–1.3)
FLUID INTAKE SUBSTANCE CLASS: SIGNIFICANT CHANGE UP
FLUID SEGMENTED GRANULOCYTES: 31 % — SIGNIFICANT CHANGE UP
GLUCOSE FLD-MCNC: 120 MG/DL — SIGNIFICANT CHANGE UP
GLUCOSE SERPL-MCNC: 148 MG/DL — HIGH (ref 70–99)
HCT VFR BLD CALC: 24.3 % — LOW (ref 39–50)
HGB BLD-MCNC: 7.7 G/DL — LOW (ref 13–17)
INR BLD: 1.04 — SIGNIFICANT CHANGE UP (ref 0.88–1.16)
LDH SERPL L TO P-CCNC: 299 U/L — SIGNIFICANT CHANGE UP
LYMPHOCYTES # FLD: 46 % — SIGNIFICANT CHANGE UP
MAGNESIUM SERPL-MCNC: 2.2 MG/DL — SIGNIFICANT CHANGE UP (ref 1.6–2.6)
MCHC RBC-ENTMCNC: 30.2 PG — SIGNIFICANT CHANGE UP (ref 27–34)
MCHC RBC-ENTMCNC: 31.7 G/DL — LOW (ref 32–36)
MCV RBC AUTO: 95.3 FL — SIGNIFICANT CHANGE UP (ref 80–100)
MESOTHL CELL # FLD: 2 % — SIGNIFICANT CHANGE UP
MONOS+MACROS # FLD: 20 % — SIGNIFICANT CHANGE UP
OTHER CELLS FLD MANUAL: SIGNIFICANT CHANGE UP %
PH FLD: 7.88 — SIGNIFICANT CHANGE UP
PHOSPHATE SERPL-MCNC: 2.7 MG/DL — SIGNIFICANT CHANGE UP (ref 2.5–4.5)
PLATELET # BLD AUTO: 451 K/UL — HIGH (ref 150–400)
POTASSIUM SERPL-MCNC: 3.7 MMOL/L — SIGNIFICANT CHANGE UP (ref 3.5–5.3)
POTASSIUM SERPL-SCNC: 3.7 MMOL/L — SIGNIFICANT CHANGE UP (ref 3.5–5.3)
PROT FLD-MCNC: 1.8 G/DL — SIGNIFICANT CHANGE UP
PROT SERPL-MCNC: 5.5 G/DL — LOW (ref 6–8.3)
PROTHROM AB SERPL-ACNC: 11.6 SEC — SIGNIFICANT CHANGE UP (ref 9.8–12.7)
RBC # BLD: 2.55 M/UL — LOW (ref 4.2–5.8)
RBC # FLD: 17.2 % — HIGH (ref 10.3–16.9)
RCV VOL RI: HIGH /UL (ref 0–5)
RH IG SCN BLD-IMP: POSITIVE — SIGNIFICANT CHANGE UP
SODIUM SERPL-SCNC: 129 MMOL/L — LOW (ref 135–145)
SPECIMEN SOURCE FLD: SIGNIFICANT CHANGE UP
TOTAL NUCLEATED CELL COUNT, BODY FLUID: 213 /UL — HIGH (ref 0–5)
TUBE TYPE: SIGNIFICANT CHANGE UP
WBC # BLD: 14.3 K/UL — HIGH (ref 3.8–10.5)
WBC # FLD AUTO: 14.3 K/UL — HIGH (ref 3.8–10.5)

## 2017-11-30 PROCEDURE — 99233 SBSQ HOSP IP/OBS HIGH 50: CPT | Mod: GC

## 2017-11-30 PROCEDURE — 71010: CPT | Mod: 26

## 2017-11-30 PROCEDURE — 99356: CPT

## 2017-11-30 PROCEDURE — 99233 SBSQ HOSP IP/OBS HIGH 50: CPT | Mod: 25

## 2017-11-30 PROCEDURE — 32550 INSERT PLEURAL CATH: CPT

## 2017-11-30 RX ORDER — POTASSIUM CHLORIDE 20 MEQ
20 PACKET (EA) ORAL ONCE
Qty: 0 | Refills: 0 | Status: COMPLETED | OUTPATIENT
Start: 2017-11-30 | End: 2017-11-30

## 2017-11-30 RX ADMIN — HEPARIN SODIUM 5000 UNIT(S): 5000 INJECTION INTRAVENOUS; SUBCUTANEOUS at 14:00

## 2017-11-30 RX ADMIN — HEPARIN SODIUM 5000 UNIT(S): 5000 INJECTION INTRAVENOUS; SUBCUTANEOUS at 22:29

## 2017-11-30 RX ADMIN — HYDROMORPHONE HYDROCHLORIDE 0.5 MILLIGRAM(S): 2 INJECTION INTRAMUSCULAR; INTRAVENOUS; SUBCUTANEOUS at 22:50

## 2017-11-30 RX ADMIN — ATORVASTATIN CALCIUM 10 MILLIGRAM(S): 80 TABLET, FILM COATED ORAL at 22:29

## 2017-11-30 RX ADMIN — Medication 325 MILLIGRAM(S): at 12:59

## 2017-11-30 RX ADMIN — PANTOPRAZOLE SODIUM 40 MILLIGRAM(S): 20 TABLET, DELAYED RELEASE ORAL at 06:06

## 2017-11-30 RX ADMIN — HYDROMORPHONE HYDROCHLORIDE 0.5 MILLIGRAM(S): 2 INJECTION INTRAMUSCULAR; INTRAVENOUS; SUBCUTANEOUS at 22:35

## 2017-11-30 RX ADMIN — Medication 2.5 MILLIGRAM(S): at 06:11

## 2017-11-30 RX ADMIN — SENNA PLUS 2 TABLET(S): 8.6 TABLET ORAL at 22:29

## 2017-11-30 RX ADMIN — Medication 20 MILLIEQUIVALENT(S): at 12:59

## 2017-11-30 RX ADMIN — Medication 100 MILLIGRAM(S): at 06:06

## 2017-11-30 RX ADMIN — Medication 1 TABLET(S): at 12:59

## 2017-11-30 RX ADMIN — TAMSULOSIN HYDROCHLORIDE 0.4 MILLIGRAM(S): 0.4 CAPSULE ORAL at 22:29

## 2017-11-30 RX ADMIN — Medication 12.5 MILLIGRAM(S): at 06:06

## 2017-11-30 RX ADMIN — Medication 180 MILLIGRAM(S): at 06:06

## 2017-11-30 RX ADMIN — Medication 100 MILLIGRAM(S): at 14:00

## 2017-11-30 RX ADMIN — BENZOCAINE AND MENTHOL 1 LOZENGE: 5; 1 LIQUID ORAL at 20:52

## 2017-11-30 RX ADMIN — HEPARIN SODIUM 5000 UNIT(S): 5000 INJECTION INTRAVENOUS; SUBCUTANEOUS at 06:06

## 2017-11-30 RX ADMIN — MONTELUKAST 10 MILLIGRAM(S): 4 TABLET, CHEWABLE ORAL at 22:29

## 2017-11-30 NOTE — PROGRESS NOTE ADULT - SUBJECTIVE AND OBJECTIVE BOX
ON: ANKIT  11/29: DELL @ [facility] tomorrow at 7: 30a ON: ANKIT  11/29: DELL @ [facility] tomorrow at 7: 30a  STATUS POST:  Chest tube placement with US guidance  Insertion of right tunneled central venous catheter with port          Vital Signs Last 24 Hrs  T(C): 37.3 (30 Nov 2017 17:10), Max: 37.8 (29 Nov 2017 21:10)  T(F): 99.2 (30 Nov 2017 17:10), Max: 100 (29 Nov 2017 21:10)  HR: 89 (30 Nov 2017 17:10) (88 - 96)  BP: 115/73 (30 Nov 2017 17:10) (115/73 - 129/82)  BP(mean): --  RR: 17 (30 Nov 2017 17:10) (16 - 18)  SpO2: 99% (30 Nov 2017 17:10) (96% - 100%)    I&O's Summary    29 Nov 2017 07:01  -  30 Nov 2017 07:00  --------------------------------------------------------  IN: 1680 mL / OUT: 802 mL / NET: 878 mL    30 Nov 2017 07:01  -  30 Nov 2017 19:58  --------------------------------------------------------  IN: 1000 mL / OUT: 900 mL / NET: 100 mL        SUBJECTIVE: Pt seen and examined at bedside. no acute complaints    Pain: [ ] YES [x ] NO  Pain (0-10):              Pain Control Adequate: [ x] YES [ ] NO  SOB: [ ]YES [x ] NO  Chest Discomfort: [ ] YES [ x] NO    Nausea: [ ] YES [ x] NO           Vomiting: [ ] YES [ x] NO  Flatus: [x ] YES [ ] NO             Bowel Movement: [x ] YES [ ] NO     Void: [x ]YES [ ]No    Physical Exam:  General Appearance: Appears well, NAD  Neck: Supple  Chest: Equal expansion bilaterally  CV: Pulse regular presently  Abdomen: obese, soft NT  Extremities: Grossly symmetric, SCD's in place     LABS:                        7.7    14.3  )-----------( 451      ( 30 Nov 2017 07:37 )             24.3     11-30    129<L>  |  90<L>  |  18  ----------------------------<  148<H>  3.7   |  28  |  1.16    Ca    8.0<L>      30 Nov 2017 07:37  Phos  2.7     11-30  Mg     2.2     11-30    TPro  5.5<L>  /  Alb  1.8<L>  /  TBili  2.0<H>  /  DBili  x   /  AST  53<H>  /  ALT  52<H>  /  AlkPhos  436<H>  11-30    PT/INR - ( 30 Nov 2017 07:37 )   PT: 11.6 sec;   INR: 1.04          PTT - ( 30 Nov 2017 07:37 )  PTT:28.9 sec      RADIOLOGY & ADDITIONAL STUDIES:

## 2017-11-30 NOTE — CHART NOTE - NSCHARTNOTEFT_GEN_A_CORE
Admitting Diagnosis:   Patient is a 77y old  Male who presents with a chief complaint of abdominal pain/jaundice (15 Nov 2017 15:41)      PAST MEDICAL & SURGICAL HISTORY:  BPH (benign prostatic hyperplasia)  HLD (hyperlipidemia)  HTN (hypertension)  History of appendectomy      Current Nutrition Order: Regular w/ EnsureEnlive TID (1050kcal, 60g pro), Prostat TID (300kcal, 45g pro).    PO Intake: Good (%) [   ]  Fair (50-75%) [ x  ] Poor (<25%) [   ]   Pt continues to have poor appetite, but understands importance of meeting nutrient needs. Appreciate assistance provided at meals as pt is dependant on tray set up and cutting up food.  Reports eating is "hard" for him -in terms of lack of appetite, and difficulty w/ food preparation/self-feeding. Discussed need for assitance at meals w/ RN and CNA.      GI Issues: Continues to experience early satiety w/o N/V/D/C    Pain: controlled per pt    Skin Integrity: surgical incision per flowsheet     Labs:   11-30    129<L>  |  90<L>  |  18  ----------------------------<  148<H>  3.7   |  28  |  1.16    Ca    8.0<L>      30 Nov 2017 07:37  Phos  2.7     11-30  Mg     2.2     11-30    TPro  5.5<L>  /  Alb  1.8<L>  /  TBili  2.0<H>  /  DBili  x   /  AST  53<H>  /  ALT  52<H>  /  AlkPhos  436<H>  11-30    CAPILLARY BLOOD GLUCOSE          Medications:  MEDICATIONS  (STANDING):  atorvastatin 10 milliGRAM(s) Oral at bedtime  dextrose 5% + sodium chloride 0.45%. 1000 milliLiter(s) (100 mL/Hr) IV Continuous <Continuous>  diltiazem    milliGRAM(s) Oral daily  diphenhydrAMINE   Injectable 25 milliGRAM(s) IV Push once  docusate sodium 100 milliGRAM(s) Oral two times a day  dronabinol 2.5 milliGRAM(s) Oral before breakfast  ferrous    sulfate 325 milliGRAM(s) Oral daily  fluticasone propionate 50 MICROgram(s)/spray Nasal Spray 1 Spray(s) Both Nostrils at bedtime  heparin  Injectable 5000 Unit(s) SubCutaneous every 8 hours  hydrochlorothiazide 12.5 milliGRAM(s) Oral daily  montelukast 10 milliGRAM(s) Oral at bedtime  multivitamin 1 Tablet(s) Oral daily  pantoprazole    Tablet 40 milliGRAM(s) Oral before breakfast  senna 2 Tablet(s) Oral at bedtime  tamsulosin 0.4 milliGRAM(s) Oral at bedtime    MEDICATIONS  (PRN):  benzocaine 15 mG/menthol 3.6 mG Lozenge 1 Lozenge Oral four times a day PRN Sore Throat  HYDROmorphone  Injectable 0.5 milliGRAM(s) IV Push every 4 hours PRN Severe Pain (7 - 10)  ondansetron Injectable 4 milliGRAM(s) IV Push every 6 hours PRN Nausea      Weight: 85.7 (11/22)  Daily Height in cm: 182.88 (22 Nov 2017 06:41)      Weight Change: up 5kg from admission, possible fluid changes.   No new wts taken. Please obtain recent wt for trending.    Estimated energy needs:   Needs adjusted 2/2 age, unintentional wt loss and hypermetabolic state  2107-2592kcal (25-30kcal/kg)  101-118g pro (1.2-1.4g pro/kg)  2529-2950mL (30-35mL/kg)    Subjective: Plan for plurex today. s/p chemo port placement 11/22. Per palliative recs pt has been ordered for Marinol. Pt denies change in appetite at present however change may take a few weeks. Pt continues to prefer feeding assistance as he feels week. Discussed w/ current RN and CNA.  Pt is consuming EnsureEnlives at each meal. Discussed option of Ensure Clears if juice is preferred. Encouraged ONS consumption to be between meals to optimize food intake as well. Pt has been tolerating regular diet well -however if experiencing pain/ discomfort w/ meals recommend fat restriction 2/2 cholangiocarcinoma to avoid potential pain w/ digestion of fat.     Previous Nutrition Diagnosis:  Inadequate kcal/pro intake RT early satiety AEB pt consuming ~50% meals/ONS.     Active [ x  ]  Resolved [  ]    If resolved, new PES: N/A    Goal: Pt to meet >75% estimated needs w/ good tolerance    Recommendations:  1. Continue on current diet order as tolerated However if pt begins to experience pain/discomfort rec Low fat diet w/ EnsureClears TID and Prostat BID  2. Obtain recent wt for trending  3. Appreciate assistance provided at meals PRN  4. Obtain new pre-alb labs to assess protein needs  5. Consider remeron as appetite stimulant as it is faster-acting    Education: RD provided edu on ONS options, importance of meeting nutrient needs. And monitoring for tolerance after EnsureEnlives.    Risk Level: High [ x  ] Moderate [   ] Low [   ].

## 2017-11-30 NOTE — PROGRESS NOTE ADULT - ASSESSMENT
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

## 2017-11-30 NOTE — PROGRESS NOTE ADULT - SUBJECTIVE AND OBJECTIVE BOX
Interval Events:  Patient seen and examined at bedside. Pt still with minimal breathing difficulties, but overall profoundly weak and continues to have poor appetite.     MEDICATIONS:  Pulmonary:  diphenhydrAMINE   Injectable 25 milliGRAM(s) IV Push once  montelukast 10 milliGRAM(s) Oral at bedtime    Antimicrobials:    Anticoagulants:  heparin  Injectable 5000 Unit(s) SubCutaneous every 8 hours    Cardiac:  diltiazem    milliGRAM(s) Oral daily  hydrochlorothiazide 12.5 milliGRAM(s) Oral daily  tamsulosin 0.4 milliGRAM(s) Oral at bedtime    Endocrine:  atorvastatin 10 milliGRAM(s) Oral at bedtime    Allergies    erythromycin (Other)    Intolerances        Vital Signs Last 24 Hrs  T(C): 36.7 (30 Nov 2017 13:31), Max: 37.8 (29 Nov 2017 21:10)  T(F): 98.1 (30 Nov 2017 13:31), Max: 100 (29 Nov 2017 21:10)  HR: 96 (30 Nov 2017 13:31) (80 - 96)  BP: 117/77 (30 Nov 2017 13:31) (116/80 - 134/72)  BP(mean): --  RR: 18 (30 Nov 2017 13:31) (16 - 18)  SpO2: 96% (30 Nov 2017 13:31) (96% - 100%)    11-29 @ 07:01 - 11-30 @ 07:00  --------------------------------------------------------  IN: 1680 mL / OUT: 802 mL / NET: 878 mL    11-30 @ 07:01 - 11-30 @ 13:54  --------------------------------------------------------  IN: 200 mL / OUT: 0 mL / NET: 200 mL      Physical exam  General - NAD, laying in bed comfortably  HEENT - MMM  Neck - supple, no JVD  Resp - Dec BS at L lung base and R to mid lung field  Cardiac - S1S2 RRR no M/R/G  Abd - mildly distended with mild anasarca noted at flank  Ext - +1 pitting edema B/L      LABS:      CBC Full  -  ( 30 Nov 2017 07:37 )  WBC Count : 14.3 K/uL  Hemoglobin : 7.7 g/dL  Hematocrit : 24.3 %  Platelet Count - Automated : 451 K/uL  Mean Cell Volume : 95.3 fL  Mean Cell Hemoglobin : 30.2 pg  Mean Cell Hemoglobin Concentration : 31.7 g/dL    11-30    129<L>  |  90<L>  |  18  ----------------------------<  148<H>  3.7   |  28  |  1.16    Ca    8.0<L>      30 Nov 2017 07:37  Phos  2.7     11-30  Mg     2.2     11-30    TPro  5.5<L>  /  Alb  1.8<L>  /  TBili  2.0<H>  /  DBili  x   /  AST  53<H>  /  ALT  52<H>  /  AlkPhos  436<H>  11-30    PT/INR - ( 30 Nov 2017 07:37 )   PT: 11.6 sec;   INR: 1.04          PTT - ( 30 Nov 2017 07:37 )  PTT:28.9 sec                  RADIOLOGY & ADDITIONAL STUDIES (The following images were personally reviewed):  CXR (11/30/17): interval placement of chest tube with improved pleural effusion on R, no pneumothorax

## 2017-11-30 NOTE — PROGRESS NOTE ADULT - PROBLEM SELECTOR PLAN 1
Large pleural effusion on the R that is likely a malignant effusion, cytology consistent with adenocarcinoma, s/p chest tube placement 11/24 with 3.5L of drainage  - Given recurrent malignant effusion after removal of chest tube, pleurx catheter placed this morning. Pt tolerated procedure well, post procedural CXR with no pneumothorax. SW to set up pleurx collection kit pending pt dispo  - OOBTC, incentive spirometry, PT  - Dispo per primary team

## 2017-11-30 NOTE — PROGRESS NOTE ADULT - SUBJECTIVE AND OBJECTIVE BOX
JAVAN KEYES   MRN-[id number]    CC: symptom control/GOC    HPI:  Patient is a 77 year old pleasant male with a PMHx of HTN, HLD, arrhythmia, and BPH who was admitted to [facility] on 11/15 at the request of his PCP for an abnormal CT. 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 ERCP was done 11/16, with sphincterotomy and placement of two metal stents in the R and L hepatic ducts. Hospital course was complicated by melena that has since resolved and a R malignant pleural effusions now with chest tube. Palliative care was consulted as the patient is not a surgical or chemotherapy candidate.    The patient's biggest concern is his lack of appetite. He reports diminished taste and hunger but denies nausea or vomiting as being the etiology of his poor appetite. He said his appetite began to decline about a month ago with subsequent weight loss as well. At this time, the patient is so weak that he can barely get out of bed or feed himself should he try. He says he wants to beat the cancer but doesn't think it will be possible given the extent of the disease and what the physicians have told him about his prognosis. The patient only has pain in his right abdomen when he moves but otherwise reports feeling comfortable.    Yesterday the patient reported slightly more difficulty breathing after the initial chest tube was removed but since PleurX was placed this AM, he feels his breathing is much better    DYSPNEA: N	  NAUS/VOM: N	  SECRETIONS: N	  AGITATION: N  Pain pt denies      ROS  General:  +weakness  HEENT:    no headache, dry throat, dysphagia, odynophagia  Neck:  Denied  CVS:  No CP, palpitations  Resp:  no SOB, no cough  GI:  mild abd pain with movement. +abd swelling. No n/v/d/c  :  no dysuria, hematuria  Musc:  weakness  Neuro:  no focal deficits  Psych:  none  Skin:  jaundice, no rash  Lymph:  none    PEx:  T(C): 36.7 (11-30-17 @ 13:31), Max: 37.8 (11-29-17 @ 21:10)  HR: 96 (11-30-17 @ 13:31) (88 - 96)  BP: 117/77 (11-30-17 @ 13:31) (116/80 - 129/82)  RR: 18 (11-30-17 @ 13:31) (16 - 18)  SpO2: 96% (11-30-17 @ 13:31) (96% - 100%)  Wt(kg): 85.7    General: sitting comfortably in chair  HEENT: A/T N/C  MMM  Neck: supple  no JVD  CVS:  S1S2 tachycardic but regular  Resp: dyspneic with some use of accessory muscles, able to complete full sentences.  right PleurX  Bibasilar crackles R>L  GI:  distended, tympanic,  non tender. hyperactive BS  :  did not examine  Musc:  no evidence of muscle atrophy  Neuro: no focal deficits,aox3  Psych:  calm and cooperative  Skin: anasarca  Lymph: no adenopathy       erythromycin (Other)      OPIATE NAÏVE Y  iSTOP REVIEWED Y Reference #: [id number]    MEDICATIONS: REVIEWED  MEDICATIONS  (STANDING):  atorvastatin 10 milliGRAM(s) Oral at bedtime  dextrose 5% + sodium chloride 0.45%. 1000 milliLiter(s) (100 mL/Hr) IV Continuous <Continuous>  diltiazem    milliGRAM(s) Oral daily  diphenhydrAMINE   Injectable 25 milliGRAM(s) IV Push once  docusate sodium 100 milliGRAM(s) Oral two times a day  dronabinol 2.5 milliGRAM(s) Oral before breakfast  ferrous    sulfate 325 milliGRAM(s) Oral daily  fluticasone propionate 50 MICROgram(s)/spray Nasal Spray 1 Spray(s) Both Nostrils at bedtime  heparin  Injectable 5000 Unit(s) SubCutaneous every 8 hours  hydrochlorothiazide 12.5 milliGRAM(s) Oral daily  montelukast 10 milliGRAM(s) Oral at bedtime  multivitamin 1 Tablet(s) Oral daily  pantoprazole    Tablet 40 milliGRAM(s) Oral before breakfast  senna 2 Tablet(s) Oral at bedtime  tamsulosin 0.4 milliGRAM(s) Oral at bedtime    MEDICATIONS  (PRN):  benzocaine 15 mG/menthol 3.6 mG Lozenge 1 Lozenge Oral four times a day PRN Sore Throat  HYDROmorphone  Injectable 0.5 milliGRAM(s) IV Push every 4 hours PRN Severe Pain (7 - 10)  ondansetron Injectable 4 milliGRAM(s) IV Push every 6 hours PRN Nausea    Labs                        7.7    14.3  )-----------( 451      ( 30 Nov 2017 07:37 )             24.3     11-30    129<L>  |  90<L>  |  18  ----------------------------<  148<H>  3.7   |  28  |  1.16    Ca    8.0<L>      30 Nov 2017 07:37  Phos  2.7     11-30  Mg     2.2     11-30    TPro  5.5<L>  /  Alb  1.8<L>  /  TBili  2.0<H>  /  DBili  x   /  AST  53<H>  /  ALT  52<H>  /  AlkPhos  436<H>  11-30      IMAGING: REVIEWED   CT CHEST                          PROCEDURE DATE:  11/20/2017          INTERPRETATION:  CT of the CHEST without intravenous contrast dated   11/20/2017 11:39 AM    ICOMPARISON: MRI of the abdomen dated November 15, 2017.    FINDINGS:     The heart is normal in size. There is no pericardial effusion. Small   mediastinal lymph nodes are present. There is no axillary   lymphadenopathy. There is a large right pleural effusion with underlying   compressive atelectasis. There are scattered groundglass opacities   throughout the left lung with associated mild bronchiectasis. There is a   6 mm pleural-based nodule in the left lower lobe on image 187 of series   4. There is a 4 mm pleural-based pulmonary nodule in the left lower lobe   on image 175 of series 4. There is a 4 mm pulmonary nodule in the lingula   on image 152 of series 4. There is a 3 mm nodule in the lingula on image   153 of series 4. There is mild left base atelectasis.    Evaluation of the upper abdomen again demonstrates multiple masses in the   liver. There is been interval placement of bilateral biliary stents.   There is a small amount of pneumobilia in the left hepatic lobe. The   pancreas is moderately atrophic. There is a small to moderate amount of   ascites in the upper abdomen.    There is no worrisome lytic or blastic osseous lesion.      IMPRESSION:  Large right pleural effusion with underlying compressive atelectasis.    Several subcentimeter pulmonary nodules throughout the left lung, as   detailed above. Metastatic disease should be considered.    Scattered groundglass opacities throughout the left lung, which may   represent an atypical inflammatory process or may represent small airways   disease.    Redemonstration of multiple masses in the liver with interval placement   of bilateral biliary stents. Small amount of pneumobilia in the left   hepatic lobe.    Small to moderate amount of ascites in the upper abdomen.    Advanced Directives:  Full Code      Decision maker: Patient   HCP: Cassie (wife) [phone]. Copy in chart    GOALS OF CARE DISCUSSION       Palliative care info/counseling provided	           Documentation of GOC: ongoing discussion with surgical oncologist and team	          REFERRALS  		        Palliative Med        Unit SW/Case Mgmt       Patient/Family Support       Massage Therapy       Music Therapy       Hospice       Nutrition       PT/OT

## 2017-11-30 NOTE — PROGRESS NOTE ADULT - PROBLEM SELECTOR PLAN 5
Surgery resident and palliative spoke with patient today extensively about poor prognosis and how he likely will not recover from his cancer. Wife was not present but was called earlier by surgery resident, who said she was in shock and requested any services available. Patient's biggest fear is leaving his wife alone, since she can not drive or use the computer to pay bills. He believed he would benefit from home hospice, so arrangements were put in place today. Patient also said he would be unable to pay for additional services out of pocket but his medicare will likely cover all hospice expenses Surgery resident and palliative spoke with patient today extensively about poor prognosis and how he likely will not recover from his cancer. Wife was not present but was called earlier by surgery resident, who said she was in shock and requested any services available. Patient's biggest fear is leaving his wife alone, since she can not drive or use the computer to pay bills. He believed he would benefit from home hospice,  but wife is feeling very overwhelmed at the thought of caring for him at home right now. Will d/c [facility]  on medical need of new pleurax. Wife was given phone # for [facility] and encouraged to call prior to d/c from [facility] Surgery resident and palliative spoke with patient today extensively about poor prognosis and how he likely will not recover from his cancer. Wife was not present but was called earlier by surgery resident, who said she was in shock and requested any services available. Patient's biggest fear is leaving his wife alone, since she can not drive or use the computer to pay bills. He believed he would benefit from home hospice,  but wife is feeling very overwhelmed at the thought of caring for him at home right now. Will d/c [facility]  on medical need of new pleurax. Wife was given phone # for [facility] and encouraged to call prior to d/c from [facility]   Prolonged time 30 min

## 2017-11-30 NOTE — PROGRESS NOTE ADULT - PROBLEM SELECTOR PLAN 1
Diagnosed this admission with mets to lung, liver and omentum. Followed by GI (Dr Rivera) and onc surg (Dr Baker). Patient deemed not a surgical or chemo candidate.

## 2017-11-30 NOTE — PROGRESS NOTE ADULT - ASSESSMENT
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  - Carcinoma encasing and obstruction cystic duct and common hepatic duct, with intrahepatic ductal dilation.  - ERCP w/ stent placement  - LFTs mildly decreased compared to admission  - Liver bx shows adenocarcinoma compatible w/ metastatic cholangiocarcinoma  - palliative consult    2) LGIB  - Post ERCP  - Bowel prep 11/19.  - Colonoscopy 11/21  - pRBC x1 (11/21)  - No further episodes  - Pill cam has passed    3) R. Pleural effusion  - Chest tube (11/24-28). Immediate 1.3 L output  - Patient reports immediate relief  - For pleurx on Thursday    4) HTN  - diltiazem, HCTZ    5) BPH  - tamsulosin    6) HLD  - Atorvastatin 10 mg    7) Dispo: Hospice v home v [facility] 78 yo M with PMH HTN, HLD, arrhythmia, BPH with metastatic cholangiocarcinoma with spread to liver and R pleural effusion    1) Primary cholangiocarcinoma  - MRCP suggests primary cholangiocarcinoma with mets to omentum, liver, lung.  - Carcinoma encasing and obstruction cystic duct and common hepatic duct, with intrahepatic ductal dilation.  - ERCP w/ stent placement  - LFTs mildly decreased compared to admission  - Liver bx shows adenocarcinoma compatible w/ metastatic cholangiocarcinoma  - palliative consult - goals of care today    2) LGIB  - Post ERCP  - Bowel prep 11/19.  - Colonoscopy 11/21  - pRBC x1 (11/21)  - No further episodes  - Pill cam has passed    3) R. Pleural effusion  - Chest tube (11/24-28). Immediate 1.3 L output  - Patient reports immediate relief  - For pleurx toda    4) HTN  - diltiazem, HCTZ    5) BPH  - tamsulosin    6) HLD  - Atorvastatin 10 mg    7) Dispo: Hospice v home v [facility]

## 2017-12-01 LAB
ANION GAP SERPL CALC-SCNC: 10 MMOL/L — SIGNIFICANT CHANGE UP (ref 5–17)
ANION GAP SERPL CALC-SCNC: 11 MMOL/L — SIGNIFICANT CHANGE UP (ref 5–17)
BUN SERPL-MCNC: 15 MG/DL — SIGNIFICANT CHANGE UP (ref 7–23)
BUN SERPL-MCNC: 17 MG/DL — SIGNIFICANT CHANGE UP (ref 7–23)
CALCIUM SERPL-MCNC: 7.9 MG/DL — LOW (ref 8.4–10.5)
CALCIUM SERPL-MCNC: 8.2 MG/DL — LOW (ref 8.4–10.5)
CHLORIDE SERPL-SCNC: 88 MMOL/L — LOW (ref 96–108)
CHLORIDE SERPL-SCNC: 91 MMOL/L — LOW (ref 96–108)
CO2 SERPL-SCNC: 26 MMOL/L — SIGNIFICANT CHANGE UP (ref 22–31)
CO2 SERPL-SCNC: 27 MMOL/L — SIGNIFICANT CHANGE UP (ref 22–31)
CREAT SERPL-MCNC: 1.09 MG/DL — SIGNIFICANT CHANGE UP (ref 0.5–1.3)
CREAT SERPL-MCNC: 1.1 MG/DL — SIGNIFICANT CHANGE UP (ref 0.5–1.3)
CULTURE RESULTS: SIGNIFICANT CHANGE UP
GLUCOSE SERPL-MCNC: 144 MG/DL — HIGH (ref 70–99)
GLUCOSE SERPL-MCNC: 150 MG/DL — HIGH (ref 70–99)
GRAM STN FLD: SIGNIFICANT CHANGE UP
HCT VFR BLD CALC: 26.3 % — LOW (ref 39–50)
HGB BLD-MCNC: 8.2 G/DL — LOW (ref 13–17)
MAGNESIUM SERPL-MCNC: 2.3 MG/DL — SIGNIFICANT CHANGE UP (ref 1.6–2.6)
MCHC RBC-ENTMCNC: 29.5 PG — SIGNIFICANT CHANGE UP (ref 27–34)
MCHC RBC-ENTMCNC: 31.2 G/DL — LOW (ref 32–36)
MCV RBC AUTO: 94.6 FL — SIGNIFICANT CHANGE UP (ref 80–100)
NON-GYNECOLOGICAL CYTOLOGY STUDY: SIGNIFICANT CHANGE UP
PLATELET # BLD AUTO: 476 K/UL — HIGH (ref 150–400)
POTASSIUM SERPL-MCNC: 4.2 MMOL/L — SIGNIFICANT CHANGE UP (ref 3.5–5.3)
POTASSIUM SERPL-MCNC: 4.2 MMOL/L — SIGNIFICANT CHANGE UP (ref 3.5–5.3)
POTASSIUM SERPL-SCNC: 4.2 MMOL/L — SIGNIFICANT CHANGE UP (ref 3.5–5.3)
POTASSIUM SERPL-SCNC: 4.2 MMOL/L — SIGNIFICANT CHANGE UP (ref 3.5–5.3)
RBC # BLD: 2.78 M/UL — LOW (ref 4.2–5.8)
RBC # FLD: 16.7 % — SIGNIFICANT CHANGE UP (ref 10.3–16.9)
SODIUM SERPL-SCNC: 126 MMOL/L — LOW (ref 135–145)
SODIUM SERPL-SCNC: 127 MMOL/L — LOW (ref 135–145)
SPECIMEN SOURCE: SIGNIFICANT CHANGE UP
WBC # BLD: 13.6 K/UL — HIGH (ref 3.8–10.5)
WBC # FLD AUTO: 13.6 K/UL — HIGH (ref 3.8–10.5)

## 2017-12-01 PROCEDURE — 99233 SBSQ HOSP IP/OBS HIGH 50: CPT

## 2017-12-01 PROCEDURE — 99232 SBSQ HOSP IP/OBS MODERATE 35: CPT | Mod: GC

## 2017-12-01 RX ORDER — SODIUM CHLORIDE 9 MG/ML
1000 INJECTION, SOLUTION INTRAVENOUS
Qty: 0 | Refills: 0 | Status: DISCONTINUED | OUTPATIENT
Start: 2017-12-01 | End: 2017-12-01

## 2017-12-01 RX ADMIN — MONTELUKAST 10 MILLIGRAM(S): 4 TABLET, CHEWABLE ORAL at 21:44

## 2017-12-01 RX ADMIN — HYDROMORPHONE HYDROCHLORIDE 0.5 MILLIGRAM(S): 2 INJECTION INTRAMUSCULAR; INTRAVENOUS; SUBCUTANEOUS at 21:45

## 2017-12-01 RX ADMIN — PANTOPRAZOLE SODIUM 40 MILLIGRAM(S): 20 TABLET, DELAYED RELEASE ORAL at 06:21

## 2017-12-01 RX ADMIN — Medication 100 MILLIGRAM(S): at 06:21

## 2017-12-01 RX ADMIN — SODIUM CHLORIDE 100 MILLILITER(S): 9 INJECTION, SOLUTION INTRAVENOUS at 16:44

## 2017-12-01 RX ADMIN — ATORVASTATIN CALCIUM 10 MILLIGRAM(S): 80 TABLET, FILM COATED ORAL at 21:44

## 2017-12-01 RX ADMIN — HYDROMORPHONE HYDROCHLORIDE 0.5 MILLIGRAM(S): 2 INJECTION INTRAMUSCULAR; INTRAVENOUS; SUBCUTANEOUS at 22:00

## 2017-12-01 RX ADMIN — Medication 180 MILLIGRAM(S): at 06:27

## 2017-12-01 RX ADMIN — Medication 100 MILLIGRAM(S): at 16:42

## 2017-12-01 RX ADMIN — Medication 1 TABLET(S): at 12:36

## 2017-12-01 RX ADMIN — HEPARIN SODIUM 5000 UNIT(S): 5000 INJECTION INTRAVENOUS; SUBCUTANEOUS at 06:21

## 2017-12-01 RX ADMIN — Medication 12.5 MILLIGRAM(S): at 06:21

## 2017-12-01 RX ADMIN — Medication 2.5 MILLIGRAM(S): at 06:27

## 2017-12-01 RX ADMIN — SENNA PLUS 2 TABLET(S): 8.6 TABLET ORAL at 21:45

## 2017-12-01 RX ADMIN — Medication 325 MILLIGRAM(S): at 12:36

## 2017-12-01 RX ADMIN — TAMSULOSIN HYDROCHLORIDE 0.4 MILLIGRAM(S): 0.4 CAPSULE ORAL at 21:44

## 2017-12-01 RX ADMIN — BENZOCAINE AND MENTHOL 1 LOZENGE: 5; 1 LIQUID ORAL at 16:43

## 2017-12-01 RX ADMIN — HEPARIN SODIUM 5000 UNIT(S): 5000 INJECTION INTRAVENOUS; SUBCUTANEOUS at 21:44

## 2017-12-01 RX ADMIN — HEPARIN SODIUM 5000 UNIT(S): 5000 INJECTION INTRAVENOUS; SUBCUTANEOUS at 14:32

## 2017-12-01 NOTE — PROGRESS NOTE ADULT - SUBJECTIVE AND OBJECTIVE BOX
JAVAN KEYES   MRN-[id number]    CC: symptom control/GOC    HPI:  Patient is a 77 year old pleasant male with a PMHx of HTN, HLD, arrhythmia, and BPH who was admitted to [facility] on 11/15 at the request of his PCP for an abnormal CT. 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 ERCP was done 11/16, with sphincterotomy and placement of two metal stents in the R and L hepatic ducts. Hospital course was complicated by melena that has since resolved and a R malignant pleural effusions now with chest tube. Palliative care was consulted as the patient is not a surgical or chemotherapy candidate.    The patient's biggest concern is his lack of appetite. He reports diminished taste and hunger but denies nausea or vomiting as being the etiology of his poor appetite. He said his appetite began to decline about a month ago with subsequent weight loss as well. At this time, the patient is so weak that he can barely get out of bed or feed himself should he try. He says he wants to beat the cancer but doesn't think it will be possible given the extent of the disease and what the physicians have told him about his prognosis. The patient only has pain in his right abdomen when he moves but otherwise reports feeling comfortable.    Patient reports improved dyspnea since PleurX was placed yesterday. He does not think the marinol has improved his appetite.    DYSPNEA: N	  NAUS/VOM: N	  SECRETIONS: N	  AGITATION: N  Pain pt denies      ROS  General:  +weakness  HEENT:    no headache, dry throat, dysphagia, odynophagia  Neck:  Denied  CVS:  No CP, palpitations  Resp:  no SOB, no cough  GI:  mild abd pain with movement. +abd swelling. No n/v/d/c  :  no dysuria, hematuria  Musc:  weakness  Neuro:  no focal deficits  Psych:  none  Skin:  jaundice, no rash  Lymph:  none    PEx:  Vital Signs Last 24 Hrs  T(C): 36.6 (01 Dec 2017 08:15), Max: 37.8 (30 Nov 2017 20:15)  T(F): 97.8 (01 Dec 2017 08:15), Max: 100.1 (30 Nov 2017 20:15)  HR: 93 (01 Dec 2017 08:15) (89 - 96)  BP: 107/72 (01 Dec 2017 08:15) (107/72 - 127/84)  BP(mean): --  RR: 17 (01 Dec 2017 08:15) (16 - 18)  SpO2: 99% (01 Dec 2017 08:15) (96% - 100%)  Wt(kg): 85.7    General: laying comfortably in bed  HEENT: A/T N/C  MMM  Neck: supple  no JVD  CVS:  S1S2 tachycardic but regular  Resp: dyspneic with some use of accessory muscles, able to complete full sentences.  right PleurX  Bibasilar crackles R>L  GI:  distended, tympanic,  non tender. hyperactive BS  :  did not examine  Musc:  no evidence of muscle atrophy  Neuro: no focal deficits,aox3  Psych:  calm and cooperative  Skin: anasarca  Lymph: no adenopathy       erythromycin (Other)      OPIATE NAÏVE Y  iSTOP REVIEWED Y Reference #: [id number]    MEDICATIONS  (STANDING):  atorvastatin 10 milliGRAM(s) Oral at bedtime  dextrose 5% + sodium chloride 0.45%. 1000 milliLiter(s) (100 mL/Hr) IV Continuous <Continuous>  diltiazem    milliGRAM(s) Oral daily  diphenhydrAMINE   Injectable 25 milliGRAM(s) IV Push once  docusate sodium 100 milliGRAM(s) Oral two times a day  dronabinol 2.5 milliGRAM(s) Oral before breakfast  ferrous    sulfate 325 milliGRAM(s) Oral daily  fluticasone propionate 50 MICROgram(s)/spray Nasal Spray 1 Spray(s) Both Nostrils at bedtime  heparin  Injectable 5000 Unit(s) SubCutaneous every 8 hours  hydrochlorothiazide 12.5 milliGRAM(s) Oral daily  montelukast 10 milliGRAM(s) Oral at bedtime  multivitamin 1 Tablet(s) Oral daily  pantoprazole    Tablet 40 milliGRAM(s) Oral before breakfast  senna 2 Tablet(s) Oral at bedtime  tamsulosin 0.4 milliGRAM(s) Oral at bedtime    MEDICATIONS  (PRN):  benzocaine 15 mG/menthol 3.6 mG Lozenge 1 Lozenge Oral four times a day PRN Sore Throat  HYDROmorphone  Injectable 0.5 milliGRAM(s) IV Push every 4 hours PRN Severe Pain (7 - 10)  ondansetron Injectable 4 milliGRAM(s) IV Push every 6 hours PRN Nausea      Labs                                    8.2    13.6  )-----------( 476      ( 01 Dec 2017 07:40 )             26.3   12-01    126<L>  |  88<L>  |  17  ----------------------------<  144<H>  4.2   |  27  |  1.09    Ca    8.2<L>      01 Dec 2017 07:40  Phos  2.7     11-30  Mg     2.3     12-01    TPro  5.5<L>  /  Alb  1.8<L>  /  TBili  2.0<H>  /  DBili  x   /  AST  53<H>  /  ALT  52<H>  /  AlkPhos  436<H>  11-30        IMAGING: no new imaging      CT CHEST                          PROCEDURE DATE:  11/20/2017          INTERPRETATION:  CT of the CHEST without intravenous contrast dated   11/20/2017 11:39 AM    ICOMPARISON: MRI of the abdomen dated November 15, 2017.    FINDINGS:     The heart is normal in size. There is no pericardial effusion. Small   mediastinal lymph nodes are present. There is no axillary   lymphadenopathy. There is a large right pleural effusion with underlying   compressive atelectasis. There are scattered groundglass opacities   throughout the left lung with associated mild bronchiectasis. There is a   6 mm pleural-based nodule in the left lower lobe on image 187 of series   4. There is a 4 mm pleural-based pulmonary nodule in the left lower lobe   on image 175 of series 4. There is a 4 mm pulmonary nodule in the lingula   on image 152 of series 4. There is a 3 mm nodule in the lingula on image   153 of series 4. There is mild left base atelectasis.    Evaluation of the upper abdomen again demonstrates multiple masses in the   liver. There is been interval placement of bilateral biliary stents.   There is a small amount of pneumobilia in the left hepatic lobe. The   pancreas is moderately atrophic. There is a small to moderate amount of   ascites in the upper abdomen.    There is no worrisome lytic or blastic osseous lesion.      IMPRESSION:  Large right pleural effusion with underlying compressive atelectasis.    Several subcentimeter pulmonary nodules throughout the left lung, as   detailed above. Metastatic disease should be considered.    Scattered groundglass opacities throughout the left lung, which may   represent an atypical inflammatory process or may represent small airways   disease.    Redemonstration of multiple masses in the liver with interval placement   of bilateral biliary stents. Small amount of pneumobilia in the left   hepatic lobe.    Small to moderate amount of ascites in the upper abdomen.    Advanced Directives:  Full Code      Decision maker: Patient   HCP: Cassie (wife) [phone]. Copy in chart    GOALS OF CARE DISCUSSION       Palliative care info/counseling provided	           Documentation of GOC: plans for HH on Monday [facility] to meet with patient and wife on Sat in the AM. Surgical Team to begin pleurax teaching           REFERRALS  		        Palliative Med        Unit SW/Case Mgmt       Patient/Family Support       Massage Therapy       Music Therapy       Hospice       Nutrition       PT/OT

## 2017-12-01 NOTE — PROGRESS NOTE ADULT - SUBJECTIVE AND OBJECTIVE BOX
ON: ANKIT  11/30: Pleurx cath placed ON: ANKIT  11/30: Pleurx cath placed    STATUS POST:  Chest tube placement with US guidance  Insertion of right tunneled central venous catheter with port        Vital Signs Last 24 Hrs  T(C): 36.6 (01 Dec 2017 08:15), Max: 37.8 (30 Nov 2017 20:15)  T(F): 97.8 (01 Dec 2017 08:15), Max: 100.1 (30 Nov 2017 20:15)  HR: 93 (01 Dec 2017 08:15) (89 - 96)  BP: 124/79 (01 Dec 2017 12:54) (107/72 - 127/84)  BP(mean): --  RR: 17 (01 Dec 2017 08:15) (16 - 18)  SpO2: 99% (01 Dec 2017 12:54) (96% - 100%)    I&O's Summary    30 Nov 2017 07:01  -  01 Dec 2017 07:00  --------------------------------------------------------  IN: 2000 mL / OUT: 2076 mL / NET: -76 mL    01 Dec 2017 07:01  -  01 Dec 2017 12:59  --------------------------------------------------------  IN: 700 mL / OUT: 0 mL / NET: 700 mL        SUBJECTIVE: Pt seen and examined at bedside. no acute complaints    Pain: [ ] YES [x ] NO  Pain (0-10):              Pain Control Adequate: [x ] YES [ ] NO  SOB: [ ]YES [x ] NO  Chest Discomfort: [ ] YES [x ] NO    Nausea: [ ] YES [ x] NO           Vomiting: [ ] YES [ x] NO  Flatus: [x ] YES [ ] NO             Bowel Movement: [x ] YES [ ] NO     Void: [x ]YES [ ]No    Physical Exam:  General Appearance: Appears well, NAD  Neck: Supple  Chest: Equal expansion bilaterally, equal breath sounds  CV: Pulse regular presently  Abdomen: Soft, nontense, appropriate incisional tenderness, dressings clean and dry and intact  Extremities: Grossly symmetric, SCD's in place     LABS:                        8.2    13.6  )-----------( 476      ( 01 Dec 2017 07:40 )             26.3     12-01    126<L>  |  88<L>  |  17  ----------------------------<  144<H>  4.2   |  27  |  1.09    Ca    8.2<L>      01 Dec 2017 07:40  Phos  2.7     11-30  Mg     2.3     12-01    TPro  5.5<L>  /  Alb  1.8<L>  /  TBili  2.0<H>  /  DBili  x   /  AST  53<H>  /  ALT  52<H>  /  AlkPhos  436<H>  11-30    PT/INR - ( 30 Nov 2017 07:37 )   PT: 11.6 sec;   INR: 1.04          PTT - ( 30 Nov 2017 07:37 )  PTT:28.9 sec      RADIOLOGY & ADDITIONAL STUDIES:

## 2017-12-01 NOTE — PROGRESS NOTE ADULT - ASSESSMENT
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  - Carcinoma encasing and obstruction cystic duct and common hepatic duct, with intrahepatic ductal dilation.  - ERCP w/ stent placement  - LFTs mildly decreased compared to admission  - Liver bx shows adenocarcinoma compatible w/ metastatic cholangiocarcinoma  - palliative consult    2) LGIB  - Post ERCP  - Bowel prep 11/19.  - Colonoscopy 11/21  - pRBC x1 (11/21)  - No further episodes  - Pill cam has passed    3) R. Pleural effusion  - Pleurx in place  - Chest tube (11/24-28). Immediate 1.3 L output  - Patient reports immediate relief  -     4) HTN  - diltiazem, HCTZ    5) BPH  - tamsulosin    6) HLD  - Atorvastatin 10 mg    7) Dispo: Hospice v home v [facility] 76 yo M with PMH HTN, HLD, arrhythmia, BPH with metastatic cholangiocarcinoma with spread to liver and R pleural effusion    1) Primary cholangiocarcinoma  - MRCP suggests primary cholangiocarcinoma with mets to omentum, liver, lung.  - Carcinoma encasing and obstruction cystic duct and common hepatic duct, with intrahepatic ductal dilation.  - s/p ERCP w/ stent placement  - LFTs decreasing  - Liver bx shows adenocarcinoma compatible w/ metastatic cholangiocarcinoma  - dronabinol     2) R. Pleural effusion  - Pleurx in place  - Chest tube (11/24-28). Immediate 1.3 L output      3) LGIB- Stable  - Bowel prep 11/19.  - Colonoscopy 11/21  - pRBC x1 (11/21)  - No further episodes  - Pill cam has passed    4) HTN  - diltiazem, HCTZ    5) BPH  - tamsulosin    6) HLD  - Atorvastatin 10 mg    7) Dispo: Home hospice

## 2017-12-01 NOTE — PROGRESS NOTE ADULT - ASSESSMENT
78 yo M, PMH of HTN, HPL, presenting with obstructive jaundice  2/2 metastatic cholangiocarcinoma, with progression of pleural effusion and respiratory symptoms s/p chest tube placement 11/24

## 2017-12-01 NOTE — PROGRESS NOTE ADULT - SUBJECTIVE AND OBJECTIVE BOX
Interval Events: reviewed  Patient seen and examined at bedside.    Patient is a 77y old  Male who presents with a chief complaint of abdominal pain/jaundice (15 Nov 2017 15:41)    doing better after the tube was put in  PAST MEDICAL & SURGICAL HISTORY:  BPH (benign prostatic hyperplasia)  HLD (hyperlipidemia)  HTN (hypertension)  History of appendectomy      MEDICATIONS:  Pulmonary:  diphenhydrAMINE   Injectable 25 milliGRAM(s) IV Push once  montelukast 10 milliGRAM(s) Oral at bedtime    Antimicrobials:    Anticoagulants:  heparin  Injectable 5000 Unit(s) SubCutaneous every 8 hours    Cardiac:  diltiazem    milliGRAM(s) Oral daily  hydrochlorothiazide 12.5 milliGRAM(s) Oral daily  tamsulosin 0.4 milliGRAM(s) Oral at bedtime      Allergies    erythromycin (Other)    Intolerances        Vital Signs Last 24 Hrs  T(C): 37.9 (01 Dec 2017 17:01), Max: 37.9 (01 Dec 2017 17:01)  T(F): 100.3 (01 Dec 2017 17:01), Max: 100.3 (01 Dec 2017 17:01)  HR: 98 (01 Dec 2017 17:01) (93 - 98)  BP: 134/84 (01 Dec 2017 17:01) (107/72 - 134/84)  BP(mean): --  RR: 17 (01 Dec 2017 17:01) (17 - 18)  SpO2: 98% (01 Dec 2017 17:01) (95% - 100%)    11-30 @ 07:01 - 12-01 @ 07:00  --------------------------------------------------------  IN: 2000 mL / OUT: 2076 mL / NET: -76 mL    12-01 @ 07:01 - 12-01 @ 21:57  --------------------------------------------------------  IN: 1420 mL / OUT: 475 mL / NET: 945 mL          LABS:      CBC Full  -  ( 01 Dec 2017 07:40 )  WBC Count : 13.6 K/uL  Hemoglobin : 8.2 g/dL  Hematocrit : 26.3 %  Platelet Count - Automated : 476 K/uL  Mean Cell Volume : 94.6 fL  Mean Cell Hemoglobin : 29.5 pg  Mean Cell Hemoglobin Concentration : 31.2 g/dL  Auto Neutrophil # : x  Auto Lymphocyte # : x  Auto Monocyte # : x  Auto Eosinophil # : x  Auto Basophil # : x  Auto Neutrophil % : x  Auto Lymphocyte % : x  Auto Monocyte % : x  Auto Eosinophil % : x  Auto Basophil % : x    12-01    127<L>  |  91<L>  |  15  ----------------------------<  150<H>  4.2   |  26  |  1.10    Ca    7.9<L>      01 Dec 2017 19:48  Phos  2.7     11-30  Mg     2.3     12-01    TPro  5.5<L>  /  Alb  1.8<L>  /  TBili  2.0<H>  /  DBili  x   /  AST  53<H>  /  ALT  52<H>  /  AlkPhos  436<H>  11-30    PT/INR - ( 30 Nov 2017 07:37 )   PT: 11.6 sec;   INR: 1.04          PTT - ( 30 Nov 2017 07:37 )  PTT:28.9 sec                Culture Results:   No growth to date. (11-30 @ 09:34)      RADIOLOGY & ADDITIONAL STUDIES (The following images were personally reviewed):  Lopez:                                 No  Urine output:               Yes         DVT prophylaxis:         Yes       Flattus:                          Yes          Bowel movement:       Yes

## 2017-12-01 NOTE — PROGRESS NOTE ADULT - PROBLEM SELECTOR PLAN 1
Large pleural effusion on the R that is  malignant effusion.  Given progression of symptoms, cpleurax was inserted.  The Pleurax is connected to pleurovac.  DC pleurovac and drainge of the pleural fluid is daily by the pleurax at one liter per day

## 2017-12-01 NOTE — PROGRESS NOTE ADULT - ASSESSMENT
Patient is a 77 year old pleasant male with a PMHx of HTN, HLD, arrhythmia, and BPH who was admitted to 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

## 2017-12-01 NOTE — PROGRESS NOTE ADULT - PROBLEM SELECTOR PLAN 5
Yesterday, surgery resident and palliative spoke with patient extensively about poor prognosis and how he likely will not recover from his cancer and wife was called by surgery resident. Today, surgery resident and palliative team met with patient and wife and the decision was made to d/c on Monday with home hospice. Hospice [facility] liason to meet tomorrow to answer any additional questions.

## 2017-12-02 PROCEDURE — 99233 SBSQ HOSP IP/OBS HIGH 50: CPT

## 2017-12-02 RX ADMIN — ATORVASTATIN CALCIUM 10 MILLIGRAM(S): 80 TABLET, FILM COATED ORAL at 21:30

## 2017-12-02 RX ADMIN — Medication 180 MILLIGRAM(S): at 06:34

## 2017-12-02 RX ADMIN — Medication 1 TABLET(S): at 13:18

## 2017-12-02 RX ADMIN — PANTOPRAZOLE SODIUM 40 MILLIGRAM(S): 20 TABLET, DELAYED RELEASE ORAL at 06:34

## 2017-12-02 RX ADMIN — MONTELUKAST 10 MILLIGRAM(S): 4 TABLET, CHEWABLE ORAL at 21:30

## 2017-12-02 RX ADMIN — SENNA PLUS 2 TABLET(S): 8.6 TABLET ORAL at 21:30

## 2017-12-02 RX ADMIN — Medication 100 MILLIGRAM(S): at 06:34

## 2017-12-02 RX ADMIN — HEPARIN SODIUM 5000 UNIT(S): 5000 INJECTION INTRAVENOUS; SUBCUTANEOUS at 13:18

## 2017-12-02 RX ADMIN — HEPARIN SODIUM 5000 UNIT(S): 5000 INJECTION INTRAVENOUS; SUBCUTANEOUS at 06:34

## 2017-12-02 RX ADMIN — BENZOCAINE AND MENTHOL 1 LOZENGE: 5; 1 LIQUID ORAL at 06:40

## 2017-12-02 RX ADMIN — Medication 2.5 MILLIGRAM(S): at 06:34

## 2017-12-02 RX ADMIN — Medication 12.5 MILLIGRAM(S): at 06:34

## 2017-12-02 RX ADMIN — Medication 325 MILLIGRAM(S): at 13:18

## 2017-12-02 RX ADMIN — HEPARIN SODIUM 5000 UNIT(S): 5000 INJECTION INTRAVENOUS; SUBCUTANEOUS at 21:30

## 2017-12-02 RX ADMIN — TAMSULOSIN HYDROCHLORIDE 0.4 MILLIGRAM(S): 0.4 CAPSULE ORAL at 21:31

## 2017-12-02 NOTE — PROGRESS NOTE ADULT - ASSESSMENT
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  - Carcinoma encasing and obstruction cystic duct and common hepatic duct, with intrahepatic ductal dilation.  - ERCP w/ stent placement  - LFTs mildly decreased compared to admission  - Liver bx shows adenocarcinoma compatible w/ metastatic cholangiocarcinoma  - palliative consult      2) R. Pleural effusion  - Pleurx in place  - Chest tube (11/24-28). Immediate 1.3 L output  - Patient reports immediate relief    3) HTN  - diltiazem, HCTZ    4) BPH  - tamsulosin    5) HLD  - Atorvastatin 10 mg    6) LGIB- stable  - Post ERCP  - Colonoscopy 11/21  - No further episodes  - Pill cam has passed    7) Dispo: Home hospice

## 2017-12-02 NOTE — PROGRESS NOTE ADULT - SUBJECTIVE AND OBJECTIVE BOX
INTERVAL EVENTS: Pt with no overnight complaints. Feeling "lousy" this morning. States his breathing is about the same after pleurx catheter placement. Pain is well controlled.   MEDICATIONS  (STANDING):  atorvastatin 10 milliGRAM(s) Oral at bedtime  diltiazem    milliGRAM(s) Oral daily  diphenhydrAMINE   Injectable 25 milliGRAM(s) IV Push once  docusate sodium 100 milliGRAM(s) Oral two times a day  dronabinol 2.5 milliGRAM(s) Oral before breakfast  ferrous    sulfate 325 milliGRAM(s) Oral daily  fluticasone propionate 50 MICROgram(s)/spray Nasal Spray 1 Spray(s) Both Nostrils at bedtime  heparin  Injectable 5000 Unit(s) SubCutaneous every 8 hours  hydrochlorothiazide 12.5 milliGRAM(s) Oral daily  montelukast 10 milliGRAM(s) Oral at bedtime  multivitamin 1 Tablet(s) Oral daily  pantoprazole    Tablet 40 milliGRAM(s) Oral before breakfast  senna 2 Tablet(s) Oral at bedtime  tamsulosin 0.4 milliGRAM(s) Oral at bedtime    MEDICATIONS  (PRN):  benzocaine 15 mG/menthol 3.6 mG Lozenge 1 Lozenge Oral four times a day PRN Sore Throat  HYDROmorphone  Injectable 0.5 milliGRAM(s) IV Push every 4 hours PRN Severe Pain (7 - 10)  ondansetron Injectable 4 milliGRAM(s) IV Push every 6 hours PRN Nausea    OBJECTIVE    Vital Signs Last 24 Hrs  T(C): 37 (02 Dec 2017 08:41), Max: 37.9 (01 Dec 2017 17:01)  T(F): 98.6 (02 Dec 2017 08:41), Max: 100.3 (01 Dec 2017 17:01)  HR: 98 (02 Dec 2017 08:41) (97 - 98)  BP: 117/74 (02 Dec 2017 08:41) (113/78 - 134/84)  BP(mean): --  RR: 17 (02 Dec 2017 08:41) (16 - 17)  SpO2: 99% (02 Dec 2017 08:41) (95% - 100%)    I&O's Detail    01 Dec 2017 07:01  -  02 Dec 2017 07:00  --------------------------------------------------------  IN:    dextrose 5% + sodium chloride 0.45%.: 400 mL    dextrose 5% + sodium chloride 0.9%: 800 mL    Oral Fluid: 220 mL  Total IN: 1420 mL    OUT:    Chest Tube: 250 mL; (1800ml on 12/1)    Voided: 300 mL  Total OUT: 550 mL    Total NET: 870 mL    PHYSICAL EXAM:  General: Pt is awake and alert, NAD, laying comfortably in bed   HEENT: PERRLA, EOMI, neck supple, no JVD.   Respiratory: CTA b/l, no wheezing, rhonchi, or rales, Ant chest tube dressing c/d/i  Cardiovascular: RRR, normal S1S2, no M/R/G  Gastrointestinal: soft, NTND, no masses palpable, BS normal  Extremities: Warm, well perfused, pulses equal in b/l upper and lower extremities, no edema, no clubbing  Neurological: AAOx3,  nonfocal      LABS                          8.2    13.6  )-----------( 476      ( 01 Dec 2017 07:40 )             26.3   01 Dec 2017 19:48    127    |  91     |  15     ----------------------------<  150    4.2     |  26     |  1.10     Ca    7.9        01 Dec 2017 19:48  Mg     2.3       01 Dec 2017 07:40      Culture Results:   No growth to date. (11-30 @ 09:34)

## 2017-12-02 NOTE — PROGRESS NOTE ADULT - PROBLEM SELECTOR PLAN 1
Large R malignant pleural effusion sp pleurx catheter placement on 11/30.   -DC pleurovac and clamp catheter  -Drainage of the pleural fluid is daily by the pleurx at no more than one liter per day  -XR chest to assess effusion and confirm catheter remains in place

## 2017-12-02 NOTE — PROGRESS NOTE ADULT - SUBJECTIVE AND OBJECTIVE BOX
o/n: ANKIT, VSS  12/1:  fluids changed to d5 NS, F/U at 6pm. Decided on Home hospice. Likely will be set up on Monday o/n: ANKIT, VSS  12/1:  fluids changed to d5 NS, F/U at 6pm. Decided on Home hospice. Likely will be set up on Monday  STATUS POST:       SUBJECTIVE: Patient seen and examined bedside by chief resident.    diltiazem    milliGRAM(s) Oral daily  heparin  Injectable 5000 Unit(s) SubCutaneous every 8 hours  hydrochlorothiazide 12.5 milliGRAM(s) Oral daily  tamsulosin 0.4 milliGRAM(s) Oral at bedtime      Vital Signs Last 24 Hrs  T(C): 37 (02 Dec 2017 08:41), Max: 37.9 (01 Dec 2017 17:01)  T(F): 98.6 (02 Dec 2017 08:41), Max: 100.3 (01 Dec 2017 17:01)  HR: 98 (02 Dec 2017 08:41) (97 - 98)  BP: 117/74 (02 Dec 2017 08:41) (113/78 - 134/84)  BP(mean): --  RR: 17 (02 Dec 2017 08:41) (16 - 17)  SpO2: 99% (02 Dec 2017 08:41) (95% - 100%)  I&O's Detail    01 Dec 2017 07:01  -  02 Dec 2017 07:00  --------------------------------------------------------  IN:    dextrose 5% + sodium chloride 0.45%.: 400 mL    dextrose 5% + sodium chloride 0.9%: 800 mL    Oral Fluid: 220 mL  Total IN: 1420 mL    OUT:    Chest Tube: 250 mL    Voided: 300 mL  Total OUT: 550 mL    Total NET: 870 mL          General: NAD, resting comfortably in bed  C/V: NSR  Pulm: Nonlabored breathing, no respiratory distress. Pleurax in place  Abd: soft, NT/ND.  Extrem: WWP, no edema, SCDs in place        LABS:                        8.2    13.6  )-----------( 476      ( 01 Dec 2017 07:40 )             26.3     12-01    127<L>  |  91<L>  |  15  ----------------------------<  150<H>  4.2   |  26  |  1.10    Ca    7.9<L>      01 Dec 2017 19:48  Mg     2.3     12-01            RADIOLOGY & ADDITIONAL STUDIES:

## 2017-12-02 NOTE — PROGRESS NOTE ADULT - ASSESSMENT
76 yo M, PMH of HTN, HLD, BPH presenting with obstructive jaundice  2/2 metastatic cholangiocarcinoma, with spread to omentum, liver, and lung w/ subsequent development of R malignant pleural effusion.

## 2017-12-03 PROCEDURE — 99232 SBSQ HOSP IP/OBS MODERATE 35: CPT

## 2017-12-03 RX ADMIN — BENZOCAINE AND MENTHOL 1 LOZENGE: 5; 1 LIQUID ORAL at 15:48

## 2017-12-03 RX ADMIN — Medication 100 MILLIGRAM(S): at 17:34

## 2017-12-03 RX ADMIN — Medication 100 MILLIGRAM(S): at 05:22

## 2017-12-03 RX ADMIN — PANTOPRAZOLE SODIUM 40 MILLIGRAM(S): 20 TABLET, DELAYED RELEASE ORAL at 06:59

## 2017-12-03 RX ADMIN — Medication 1 TABLET(S): at 11:40

## 2017-12-03 RX ADMIN — Medication 180 MILLIGRAM(S): at 05:22

## 2017-12-03 RX ADMIN — MONTELUKAST 10 MILLIGRAM(S): 4 TABLET, CHEWABLE ORAL at 21:42

## 2017-12-03 RX ADMIN — TAMSULOSIN HYDROCHLORIDE 0.4 MILLIGRAM(S): 0.4 CAPSULE ORAL at 21:42

## 2017-12-03 RX ADMIN — BENZOCAINE AND MENTHOL 1 LOZENGE: 5; 1 LIQUID ORAL at 21:13

## 2017-12-03 RX ADMIN — ATORVASTATIN CALCIUM 10 MILLIGRAM(S): 80 TABLET, FILM COATED ORAL at 21:43

## 2017-12-03 RX ADMIN — Medication 2.5 MILLIGRAM(S): at 06:59

## 2017-12-03 RX ADMIN — HEPARIN SODIUM 5000 UNIT(S): 5000 INJECTION INTRAVENOUS; SUBCUTANEOUS at 13:36

## 2017-12-03 RX ADMIN — Medication 325 MILLIGRAM(S): at 11:40

## 2017-12-03 RX ADMIN — HEPARIN SODIUM 5000 UNIT(S): 5000 INJECTION INTRAVENOUS; SUBCUTANEOUS at 05:22

## 2017-12-03 RX ADMIN — Medication 12.5 MILLIGRAM(S): at 05:22

## 2017-12-03 RX ADMIN — BENZOCAINE AND MENTHOL 1 LOZENGE: 5; 1 LIQUID ORAL at 05:46

## 2017-12-03 RX ADMIN — HEPARIN SODIUM 5000 UNIT(S): 5000 INJECTION INTRAVENOUS; SUBCUTANEOUS at 21:42

## 2017-12-03 NOTE — PROGRESS NOTE ADULT - SUBJECTIVE AND OBJECTIVE BOX
INTERVAL EVENTS: Pt with no overnight complaints.       MEDICATIONS  (STANDING):  atorvastatin 10 milliGRAM(s) Oral at bedtime  diltiazem    milliGRAM(s) Oral daily  diphenhydrAMINE   Injectable 25 milliGRAM(s) IV Push once  docusate sodium 100 milliGRAM(s) Oral two times a day  dronabinol 2.5 milliGRAM(s) Oral before breakfast  ferrous    sulfate 325 milliGRAM(s) Oral daily  fluticasone propionate 50 MICROgram(s)/spray Nasal Spray 1 Spray(s) Both Nostrils at bedtime  heparin  Injectable 5000 Unit(s) SubCutaneous every 8 hours  hydrochlorothiazide 12.5 milliGRAM(s) Oral daily  montelukast 10 milliGRAM(s) Oral at bedtime  multivitamin 1 Tablet(s) Oral daily  pantoprazole    Tablet 40 milliGRAM(s) Oral before breakfast  senna 2 Tablet(s) Oral at bedtime  tamsulosin 0.4 milliGRAM(s) Oral at bedtime    MEDICATIONS  (PRN):  benzocaine 15 mG/menthol 3.6 mG Lozenge 1 Lozenge Oral four times a day PRN Sore Throat  HYDROmorphone  Injectable 0.5 milliGRAM(s) IV Push every 4 hours PRN Severe Pain (7 - 10)  ondansetron Injectable 4 milliGRAM(s) IV Push every 6 hours PRN Nausea    OBJECTIVE    Vital Signs Last 24 Hrs  T(C): 37.1 (03 Dec 2017 08:20), Max: 37.4 (02 Dec 2017 17:34)  T(F): 98.7 (03 Dec 2017 08:20), Max: 99.4 (02 Dec 2017 17:34)  HR: 100 (03 Dec 2017 08:20) (93 - 100)  BP: 130/82 (03 Dec 2017 08:20) (117/74 - 130/82)  BP(mean): --  RR: 17 (03 Dec 2017 08:20) (16 - 17)  SpO2: 97% (03 Dec 2017 08:20) (97% - 100%)    I&O's Summary    02 Dec 2017 07:01  -  03 Dec 2017 07:00  --------------------------------------------------------  IN: 680 mL / OUT: 636 mL / NET: 44 mL    03 Dec 2017 07:01  -  03 Dec 2017 12:27  --------------------------------------------------------  IN: 480 mL / OUT: 0 mL / NET: 480 mL      PHYSICAL EXAM:  General: Pt is awake and alert, NAD, laying comfortably in bed   HEENT: PERRLA, EOMI, neck supple, no JVD.   Respiratory: CTA b/l, no wheezing, rhonchi, or rales, Ant chest tube dressing c/d/i  Cardiovascular: RRR, normal S1S2, no M/R/G  Gastrointestinal: soft, NTND, no masses palpable, BS normal  Extremities: Warm, well perfused, pulses equal in b/l upper and lower extremities, no edema, no clubbing  Neurological: AAOx3, generalized weakness    LABS    01 Dec 2017 19:48    127    |  91     |  15     ----------------------------<  150    4.2     |  26     |  1.10     Ca    7.9        01 Dec 2017 19:48

## 2017-12-03 NOTE — PROGRESS NOTE ADULT - SUBJECTIVE AND OBJECTIVE BOX
12/2 ON: Dr. Hare: chemical pleurodesis not indicated. ANKIT 12/2 ON: Dr. Hare: chemical pleurodesis not indicated. ANKIT    SUBJECTIVE: Patient seen and examined bedside by surgery team.    diltiazem    milliGRAM(s) Oral daily  heparin  Injectable 5000 Unit(s) SubCutaneous every 8 hours  hydrochlorothiazide 12.5 milliGRAM(s) Oral daily  tamsulosin 0.4 milliGRAM(s) Oral at bedtime          Vital Signs Last 24 Hrs  T(C): 36.9 (03 Dec 2017 20:16), Max: 37.7 (03 Dec 2017 13:57)  T(F): 98.4 (03 Dec 2017 20:16), Max: 99.9 (03 Dec 2017 13:57)  HR: 82 (03 Dec 2017 20:16) (82 - 100)  BP: 119/81 (03 Dec 2017 20:16) (112/76 - 130/82)  BP(mean): --  RR: 16 (03 Dec 2017 20:16) (16 - 17)  SpO2: 97% (03 Dec 2017 20:16) (96% - 99%)  I&O's Detail    02 Dec 2017 07:01  -  03 Dec 2017 07:00  --------------------------------------------------------  IN:    Oral Fluid: 680 mL  Total IN: 680 mL    OUT:    Chest Tube: 85 mL    Stool: 1 mL    Voided: 550 mL  Total OUT: 636 mL    Total NET: 44 mL      03 Dec 2017 07:01  -  03 Dec 2017 21:38  --------------------------------------------------------  IN:    Oral Fluid: 720 mL  Total IN: 720 mL    OUT:  Total OUT: 0 mL    Total NET: 720 mL            Physical Exam  General: NAD, alert, interactive, appears comfortable  C/V: NSR  Pulm: Nonlabored breathing, no respiratory distress  Abd: softly distended, NT/ND.  Extrem: WWP, no edema, SCDs in place        LABS:                RADIOLOGY & ADDITIONAL STUDIES: 12/2 ON: Dr. Hare: chemical pleurodesis not indicated. ANKIT    SUBJECTIVE: Patient seen and examined bedside by surgery team.    diltiazem    milliGRAM(s) Oral daily  heparin  Injectable 5000 Unit(s) SubCutaneous every 8 hours  hydrochlorothiazide 12.5 milliGRAM(s) Oral daily  tamsulosin 0.4 milliGRAM(s) Oral at bedtime          Vital Signs Last 24 Hrs  T(C): 36.9 (03 Dec 2017 20:16), Max: 37.7 (03 Dec 2017 13:57)  T(F): 98.4 (03 Dec 2017 20:16), Max: 99.9 (03 Dec 2017 13:57)  HR: 82 (03 Dec 2017 20:16) (82 - 100)  BP: 119/81 (03 Dec 2017 20:16) (112/76 - 130/82)  BP(mean): --  RR: 16 (03 Dec 2017 20:16) (16 - 17)  SpO2: 97% (03 Dec 2017 20:16) (96% - 99%)  I&O's Detail    02 Dec 2017 07:01  -  03 Dec 2017 07:00  --------------------------------------------------------  IN:    Oral Fluid: 680 mL  Total IN: 680 mL    OUT:    Chest Tube: 85 mL    Stool: 1 mL    Voided: 550 mL  Total OUT: 636 mL    Total NET: 44 mL      03 Dec 2017 07:01  -  03 Dec 2017 21:38  --------------------------------------------------------  IN:    Oral Fluid: 720 mL  Total IN: 720 mL    OUT:  Total OUT: 0 mL    Total NET: 720 mL        Physical Exam  General: NAD, resting comfortably in bed  C/V: NSR  Pulm: Nonlabored breathing, no respiratory distress. Pleurax in place  Abd: soft, NT/ND.  Extrem: WWP, no edema, SCDs in place      LABS:                RADIOLOGY & ADDITIONAL STUDIES:

## 2017-12-03 NOTE — PROGRESS NOTE ADULT - ASSESSMENT
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  - Carcinoma encasing and obstruction cystic duct and common hepatic duct, with intrahepatic ductal dilation.  - ERCP w/ stent placement  - LFTs mildly decreased compared to admission  - Liver bx shows adenocarcinoma compatible w/ metastatic cholangiocarcinoma  - palliative consult      2) R. Pleural effusion  - Pleurx in place  - Chest tube (11/24-28). Immediate 1.3 L output  - Patient reports immediate relief    3) HTN  - diltiazem, HCTZ    4) BPH  - tamsulosin    5) HLD  - Atorvastatin 10 mg    6) LGIB- stable  - Post ERCP  - Colonoscopy 11/21  - No further episodes  - Pill cam has passed    7) Dispo: Home hospice

## 2017-12-03 NOTE — PROGRESS NOTE ADULT - ASSESSMENT
78 yo M, PMH of HTN, HLD, BPH presenting with obstructive jaundice  2/2 metastatic cholangiocarcinoma, with spread to omentum, liver, and lung w/ subsequent development of R malignant pleural effusion.

## 2017-12-03 NOTE — PROGRESS NOTE ADULT - ATTENDING COMMENTS
Pt was seen and examined. Agree with the above
Pt was seen and examined. Agree with the above. LFT-s are improving. Will do colonoscopy in Monday. Awaiting cytology
pt seen examined  stable   NAD  as res note  Await home Hospice
pt seen examined 12-2-17 11pm with surg res  as above res note  pt appears rel comfortable  breathing ok with nasal O2 and R chest pleurex cath in place draining yellowish fluid    plan for home hospice
Patient seen and examined with house-staff during bedside rounds.  Resident note read, including vitals, physical findings, laboratory data, and radiological reports.   Revisions included below.  Direct personal management at bed side and extensive interpretation of the data.  Plan was outlined and discussed in details with the housestaff.  Decision making of high complexity  no thoracentesis and continue oxygen
About 600 ml fluiddrained past 24 hours.  No respiratory complaints, minimal pain from tube.  Exam: decreased breath sounds right base.  Looks comfortable.  Will clamp tube today in preparation for converting to PleurX catheter for long term drainage.
Patient seen and examined with house-staff during bedside rounds.  Resident note read, including vitals, physical findings, laboratory data, and radiological reports.   Revisions included below.  Direct personal management at bed side and extensive interpretation of the data.  Plan was outlined and discussed in details with the housestaff.  Decision making of high complexity  No drainage and will follow in the next 24 hours.  Cytology P. CXR in am
Patient seen and examined with house-staff during bedside rounds.  Resident note read, including vitals, physical findings, laboratory data, and radiological reports.   Revisions included below.  Direct personal management at bed side and extensive interpretation of the data.  Plan was outlined and discussed in details with the housestaff.  Decision making of high complexity  continue following CXR and cytology positive.  For pleurax discussed with patient
Patient seen and examined with house-staff during bedside rounds.  Resident note read, including vitals, physical findings, laboratory data, and radiological reports.   Revisions included below.  Direct personal management at bed side and extensive interpretation of the data.  Plan was outlined and discussed in details with the housestaff.  Decision making of high complexity
Breathing much better, mild discomfort from chest tube. Drainage total about 3 L, still draining. Small area of dullness, decreased breath sounds R base.  Bilateral leg edema. Will likely need long term drainage of pleural fluid with PleurX

## 2017-12-04 PROCEDURE — 99232 SBSQ HOSP IP/OBS MODERATE 35: CPT | Mod: GC

## 2017-12-04 PROCEDURE — 99233 SBSQ HOSP IP/OBS HIGH 50: CPT

## 2017-12-04 RX ADMIN — Medication 100 MILLIGRAM(S): at 05:33

## 2017-12-04 RX ADMIN — HEPARIN SODIUM 5000 UNIT(S): 5000 INJECTION INTRAVENOUS; SUBCUTANEOUS at 21:10

## 2017-12-04 RX ADMIN — Medication 100 MILLIGRAM(S): at 17:35

## 2017-12-04 RX ADMIN — ATORVASTATIN CALCIUM 10 MILLIGRAM(S): 80 TABLET, FILM COATED ORAL at 21:09

## 2017-12-04 RX ADMIN — HEPARIN SODIUM 5000 UNIT(S): 5000 INJECTION INTRAVENOUS; SUBCUTANEOUS at 13:23

## 2017-12-04 RX ADMIN — BENZOCAINE AND MENTHOL 1 LOZENGE: 5; 1 LIQUID ORAL at 11:37

## 2017-12-04 RX ADMIN — Medication 325 MILLIGRAM(S): at 11:34

## 2017-12-04 RX ADMIN — Medication 1 TABLET(S): at 11:34

## 2017-12-04 RX ADMIN — Medication 12.5 MILLIGRAM(S): at 05:33

## 2017-12-04 RX ADMIN — HEPARIN SODIUM 5000 UNIT(S): 5000 INJECTION INTRAVENOUS; SUBCUTANEOUS at 05:33

## 2017-12-04 RX ADMIN — MONTELUKAST 10 MILLIGRAM(S): 4 TABLET, CHEWABLE ORAL at 21:09

## 2017-12-04 RX ADMIN — BENZOCAINE AND MENTHOL 1 LOZENGE: 5; 1 LIQUID ORAL at 21:10

## 2017-12-04 RX ADMIN — SENNA PLUS 2 TABLET(S): 8.6 TABLET ORAL at 21:09

## 2017-12-04 RX ADMIN — Medication 180 MILLIGRAM(S): at 05:34

## 2017-12-04 RX ADMIN — TAMSULOSIN HYDROCHLORIDE 0.4 MILLIGRAM(S): 0.4 CAPSULE ORAL at 21:09

## 2017-12-04 NOTE — DISCHARGE NOTE ADULT - PROVIDER TOKENS
TOKEN:'[id number]:'[id number] TOKEN:'[id number]:'[id number]:'[id number]'

## 2017-12-04 NOTE — DISCHARGE NOTE ADULT - PATIENT PORTAL LINK FT
“You can access the [employer] Patient Portal, offered by [facility], by registering with the following website: [email or web address]”

## 2017-12-04 NOTE — DISCHARGE NOTE ADULT - PLAN OF CARE
Hospice You will be discharged with home hospice.  Contact your doctor or go to the ER for fever > 101.5, chills, nausea, vomiting, chest pain, shortness of breath, pain not controlled by medication or excessive bleeding. You will be discharged with home hospice.    Contact your doctor or go to the ER for fever > 101.5, chills, nausea, vomiting, chest pain, shortness of breath, pain not controlled by medication or excessive bleeding. Decrease Shortness of breath You will go home with a PleurX catheter to help drain the fluid in the lungs. You will drain it once every 2 days to begin with.     Dr. Peterson placed the chest tube, and therefore if you have questions please contact his office : [phone]

## 2017-12-04 NOTE — PROGRESS NOTE ADULT - ASSESSMENT
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

## 2017-12-04 NOTE — PROGRESS NOTE ADULT - SUBJECTIVE AND OBJECTIVE BOX
o/n: ANKIT  12/3: no events; o/n: ANKIT  12/3: no events;     SUBJECTIVE: Patient seen and examined bedside by chief resident. Does not feel ready about going home today with PleurX catheter.    diltiazem    milliGRAM(s) Oral daily  heparin  Injectable 5000 Unit(s) SubCutaneous every 8 hours  hydrochlorothiazide 12.5 milliGRAM(s) Oral daily  tamsulosin 0.4 milliGRAM(s) Oral at bedtime      Vital Signs Last 24 Hrs  T(C): 37.2 (04 Dec 2017 08:30), Max: 37.2 (04 Dec 2017 08:30)  T(F): 98.9 (04 Dec 2017 08:30), Max: 98.9 (04 Dec 2017 08:30)  HR: 98 (04 Dec 2017 08:30) (82 - 98)  BP: 119/81 (04 Dec 2017 08:30) (119/80 - 123/79)  BP(mean): --  RR: 17 (04 Dec 2017 08:30) (16 - 17)  SpO2: 99% (04 Dec 2017 08:30) (96% - 99%)  I&O's Detail    03 Dec 2017 07:01  -  04 Dec 2017 07:00  --------------------------------------------------------  IN:    Oral Fluid: 720 mL  Total IN: 720 mL    OUT:    Chest Tube: 160 mL  Total OUT: 160 mL    Total NET: 560 mL          General: NAD, resting comfortably in bed  C/V: NSR  Pulm: Nonlabored breathing, no respiratory distress  Abd: soft, NT/ND.  Extrem: WWP, no edema, SCDs in place        LABS:                RADIOLOGY & ADDITIONAL STUDIES:

## 2017-12-04 NOTE — DISCHARGE NOTE ADULT - MEDICATION SUMMARY - MEDICATIONS TO TAKE
I will START or STAY ON the medications listed below when I get home from the hospital:    aspirin  -- Indication: For Home Med    tamsulosin 0.4 mg oral capsule  -- 1 cap(s) by mouth once a day  -- Indication: For Home Med    dilTIAZem  -- Indication: For Home Med    atorvastatin  -- Indication: For Home Med    hydroCHLOROthiazide  -- Indication: For Home Med

## 2017-12-04 NOTE — ADVANCED PRACTICE NURSE CONSULT - RECOMMEDATIONS
Bilateral ears and bridge of nose - apply foam dressing every 3 days and prn if soiled or wet.   Discussed assessment and recommendations with AGUSTIN, Maritza and house staff.

## 2017-12-04 NOTE — DISCHARGE NOTE ADULT - CARE PROVIDERS DIRECT ADDRESSES
,DirectAddress_Unknown,DirectAddress_Unknown ,DirectAddress_Unknown,DirectAddress_Unknown,[email or web address]

## 2017-12-04 NOTE — DISCHARGE NOTE ADULT - CARE PROVIDER_API CALL
Becky Baker), Surgery; Surgical Oncology  [address]  [address]  [place] [address]  Phone: [phone]  Fax: [phone]    Vicenta Hare), Critical Care Medicine; Pulmonary Disease  [address]  [address]  [place] [address]  Phone: [phone]  Fax: [phone] Becky Baker), Surgery; Surgical Oncology  [address]  [address]  [place] [address]  Phone: [phone]  Fax: [phone]    Vicenta Hare), Critical Care Medicine; Pulmonary Disease  [address]  [address]  [place] [address]  Phone: [phone]  Fax: [phone]    Aris Peterson), Internal Medicine; Pulmonary Disease  [address] [address]  Phone: [phone]  Fax: [phone]

## 2017-12-04 NOTE — ADVANCED PRACTICE NURSE CONSULT - REASON FOR CONSULT
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

## 2017-12-04 NOTE — PROGRESS NOTE ADULT - PROBLEM SELECTOR PLAN 1
Large R malignant pleural effusion sp pleurx catheter placement on 11/30.   -DC pleurovac and clamp catheter  -Drainage of the pleural fluid is daily by the pleurx at no more than one liter per day  he is to continue on CT drainage today and change to pleurax tomorrow he is going to home hospice

## 2017-12-04 NOTE — PROGRESS NOTE ADULT - SUBJECTIVE AND OBJECTIVE BOX
Interval Events: reviewed  Patient seen and examined at bedside.    Patient is a 77y old  Male who presents with a chief complaint of abdominal pain/jaundice (15 Nov 2017 15:41)  he is doing well and he is going home tomorrow  doing better after the tube was put in  PAST MEDICAL & SURGICAL HISTORY:  BPH (benign prostatic hyperplasia)  HLD (hyperlipidemia)  HTN (hypertension)  History of appendectomy      MEDICATIONS:  Pulmonary:  diphenhydrAMINE   Injectable 25 milliGRAM(s) IV Push once  montelukast 10 milliGRAM(s) Oral at bedtime    Antimicrobials:    Anticoagulants:  heparin  Injectable 5000 Unit(s) SubCutaneous every 8 hours    Cardiac:  diltiazem    milliGRAM(s) Oral daily  hydrochlorothiazide 12.5 milliGRAM(s) Oral daily  tamsulosin 0.4 milliGRAM(s) Oral at bedtime      Allergies    erythromycin (Other)    Intolerances        Vital Signs Last 24 Hrs  T(C): 37.9 (01 Dec 2017 17:01), Max: 37.9 (01 Dec 2017 17:01)  T(F): 100.3 (01 Dec 2017 17:01), Max: 100.3 (01 Dec 2017 17:01)  HR: 98 (01 Dec 2017 17:01) (93 - 98)  BP: 134/84 (01 Dec 2017 17:01) (107/72 - 134/84)  BP(mean): --  RR: 17 (01 Dec 2017 17:01) (17 - 18)  SpO2: 98% (01 Dec 2017 17:01) (95% - 100%)    11-30 @ 07:01 - 12-01 @ 07:00  --------------------------------------------------------  IN: 2000 mL / OUT: 2076 mL / NET: -76 mL    12-01 @ 07:01 - 12-01 @ 21:57  --------------------------------------------------------  IN: 1420 mL / OUT: 475 mL / NET: 945 mL          LABS:      CBC Full  -  ( 01 Dec 2017 07:40 )  WBC Count : 13.6 K/uL  Hemoglobin : 8.2 g/dL  Hematocrit : 26.3 %  Platelet Count - Automated : 476 K/uL  Mean Cell Volume : 94.6 fL  Mean Cell Hemoglobin : 29.5 pg  Mean Cell Hemoglobin Concentration : 31.2 g/dL  Auto Neutrophil # : x  Auto Lymphocyte # : x  Auto Monocyte # : x  Auto Eosinophil # : x  Auto Basophil # : x  Auto Neutrophil % : x  Auto Lymphocyte % : x  Auto Monocyte % : x  Auto Eosinophil % : x  Auto Basophil % : x    12-01    127<L>  |  91<L>  |  15  ----------------------------<  150<H>  4.2   |  26  |  1.10    Ca    7.9<L>      01 Dec 2017 19:48  Phos  2.7     11-30  Mg     2.3     12-01    TPro  5.5<L>  /  Alb  1.8<L>  /  TBili  2.0<H>  /  DBili  x   /  AST  53<H>  /  ALT  52<H>  /  AlkPhos  436<H>  11-30    PT/INR - ( 30 Nov 2017 07:37 )   PT: 11.6 sec;   INR: 1.04          PTT - ( 30 Nov 2017 07:37 )  PTT:28.9 sec                Culture Results:   No growth to date. (11-30 @ 09:34)      RADIOLOGY & ADDITIONAL STUDIES (The following images were personally reviewed):  Lopez:                                 No  Urine output:               Yes         DVT prophylaxis:         Yes       Flattus:                          Yes          Bowel movement:       Yes

## 2017-12-04 NOTE — DISCHARGE NOTE ADULT - CARE PLAN
Principal Discharge DX:	Cholangiocarcinoma  Goal:	Hospice  Instructions for follow-up, activity and diet:	You will be discharged with home hospice.  Contact your doctor or go to the ER for fever > 101.5, chills, nausea, vomiting, chest pain, shortness of breath, pain not controlled by medication or excessive bleeding.  Secondary Diagnosis:	NEWELL (dyspnea on exertion) Principal Discharge DX:	Cholangiocarcinoma  Goal:	Hospice  Instructions for follow-up, activity and diet:	You will be discharged with home hospice.    Contact your doctor or go to the ER for fever > 101.5, chills, nausea, vomiting, chest pain, shortness of breath, pain not controlled by medication or excessive bleeding.  Secondary Diagnosis:	NEWELL (dyspnea on exertion)  Goal:	Decrease Shortness of breath  Instructions for follow-up, activity and diet:	You will go home with a PleurX catheter to help drain the fluid in the lungs. You will drain it once every 2 days to begin with.     Dr. Peterson placed the chest tube, and therefore if you have questions please contact his office : [phone]

## 2017-12-04 NOTE — DISCHARGE NOTE ADULT - HOSPITAL COURSE
Patient is a 77 year old pleasant male with a PMHx of HTN, HLD, arrhythmia, and BPH who was admitted to [facility] on 11/15 at the request of his PCP for an abnormal CT. 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 ERCP was done 11/16, with sphincterotomy and placement of two metal stents in the R and L hepatic ducts. Hospital course was complicated by melena that has since resolved and a R malignant pleural effusions now with PleurX catheter for auto-pleurodesis. Palliative care was consulted as the patient is not a surgical or chemotherapy candidate. At this time his family felt it was most appropriate to be discharged to home hospice.  On day of discharge, pt deemed stable and ready to return home with plan to follow up as an outpatient.

## 2017-12-04 NOTE — PROGRESS NOTE ADULT - SUBJECTIVE AND OBJECTIVE BOX
JAVAN KEYES   MRN-[id number]    CC: ne wdx metastatic cholangiocarcinoma     HPI:  Patient is a 77 year old pleasant male with a PMHx of HTN, HLD, arrhythmia, and BPH who was admitted to [facility] on 11/15 at the request of his PCP for an abnormal CT. 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 ERCP was done 11/16, with sphincterotomy and placement of two metal stents in the R and L hepatic ducts. Hospital course was complicated by melena that has since resolved and a R malignant pleural effusions now with chest tube. Palliative care was consulted as the patient is not a surgical or chemotherapy candidate.    The patient's biggest concern is his lack of appetite. He reports diminished taste and hunger but denies nausea or vomiting as being the etiology of his poor appetite. He said his appetite began to decline about a month ago with subsequent weight loss as well. At this time, the patient is so weak that he can barely get out of bed or feed himself should he try. He says he wants to beat the cancer but doesn't think it will be possible given the extent of the disease and what the physicians have told him about his prognosis. The patient only has pain in his right abdomen when he moves but otherwise reports feeling comfortable.    Patient reports improved dyspnea since PleurX was placed  He does not think the marinol has improved his appetite as of this time     DYSPNEA: N	  NAUS/VOM: N	  SECRETIONS: N	  AGITATION: N  Pain pt denies      ROS  General:  +weakness  HEENT:    no headache, dry throat, dysphagia, odynophagia  Neck:  Denied  CVS:  No CP, palpitations  Resp:  no SOB, no cough  GI:  mild abd pain with movement. +abd swelling. No n/v/d/c  :  no dysuria, hematuria  Musc:  weakness  Neuro:  no focal deficits  Psych:  none  Skin:  jaundice, no rash  Lymph:  none       General: laying comfortably in bed  HEENT: A/T N/C  MMM  Neck: supple  no JVD  CVS:  S1S2  RRR tachycardic  Resp: CTA B/ l, .  right PleurX  Bibasilar crackles R>L  GI:  distended, tympanic,  non tender. hyperactive BS  :  voiding freely  Musc:  no evidence of muscle atrophy  Neuro: no focal deficits,aox3  Psych:  calm and cooperative  Skin: anasarca  Lymph: no adenopathy    erythromycin (Other)    OPIATE NAÏVE (Y/N): Y  iSTOP REVIEWED (Y/N): Yes on admission    MEDICATIONS: REVIEWED  MEDICATIONS  (STANDING):  atorvastatin 10 milliGRAM(s) Oral at bedtime  diltiazem    milliGRAM(s) Oral daily  diphenhydrAMINE   Injectable 25 milliGRAM(s) IV Push once  docusate sodium 100 milliGRAM(s) Oral two times a day  dronabinol 2.5 milliGRAM(s) Oral before breakfast  ferrous    sulfate 325 milliGRAM(s) Oral daily  fluticasone propionate 50 MICROgram(s)/spray Nasal Spray 1 Spray(s) Both Nostrils at bedtime  heparin  Injectable 5000 Unit(s) SubCutaneous every 8 hours  hydrochlorothiazide 12.5 milliGRAM(s) Oral daily  montelukast 10 milliGRAM(s) Oral at bedtime  multivitamin 1 Tablet(s) Oral daily  pantoprazole    Tablet 40 milliGRAM(s) Oral before breakfast  senna 2 Tablet(s) Oral at bedtime  tamsulosin 0.4 milliGRAM(s) Oral at bedtime    MEDICATIONS  (PRN):  benzocaine 15 mG/menthol 3.6 mG Lozenge 1 Lozenge Oral four times a day PRN Sore Throat  HYDROmorphone  Injectable 0.5 milliGRAM(s) IV Push every 4 hours PRN Severe Pain (7 - 10)  ondansetron Injectable 4 milliGRAM(s) IV Push every 6 hours PRN Nausea      LABS: no AM labs    IMAGING :No new iamaging    Advanced Directives:  Full Code      Decision maker: Patient   HCP: Cassie (wife) [phone]. Copy in chart    GOALS OF CARE DISCUSSION       Palliative care info/counseling provided	           Documentation of GOC: plans for HH on Tuesday in the AM, equipment to be delivered today. Wife taught use of pleurax this AM  		        Palliative Med        Unit SW/Case Mgmt       Patient/Family Support       Massage Therapy       Music Therapy       Hospice       Nutrition       PT/OT

## 2017-12-05 VITALS
TEMPERATURE: 98 F | DIASTOLIC BLOOD PRESSURE: 71 MMHG | HEART RATE: 91 BPM | RESPIRATION RATE: 16 BRPM | OXYGEN SATURATION: 100 % | SYSTOLIC BLOOD PRESSURE: 105 MMHG

## 2017-12-05 LAB
CULTURE RESULTS: NO GROWTH — SIGNIFICANT CHANGE UP
SPECIMEN SOURCE: SIGNIFICANT CHANGE UP
VIRUS SPEC CULT: SIGNIFICANT CHANGE UP

## 2017-12-05 PROCEDURE — 85027 COMPLETE CBC AUTOMATED: CPT

## 2017-12-05 PROCEDURE — A9585: CPT

## 2017-12-05 PROCEDURE — 87116 MYCOBACTERIA CULTURE: CPT

## 2017-12-05 PROCEDURE — 91110 GI TRC IMG INTRAL ESOPH-ILE: CPT

## 2017-12-05 PROCEDURE — 99233 SBSQ HOSP IP/OBS HIGH 50: CPT

## 2017-12-05 PROCEDURE — 99153 MOD SED SAME PHYS/QHP EA: CPT

## 2017-12-05 PROCEDURE — 97116 GAIT TRAINING THERAPY: CPT

## 2017-12-05 PROCEDURE — 88307 TISSUE EXAM BY PATHOLOGIST: CPT

## 2017-12-05 PROCEDURE — 88341 IMHCHEM/IMCYTCHM EA ADD ANTB: CPT

## 2017-12-05 PROCEDURE — 80053 COMPREHEN METABOLIC PANEL: CPT

## 2017-12-05 PROCEDURE — 86850 RBC ANTIBODY SCREEN: CPT

## 2017-12-05 PROCEDURE — 87102 FUNGUS ISOLATION CULTURE: CPT

## 2017-12-05 PROCEDURE — 99152 MOD SED SAME PHYS/QHP 5/>YRS: CPT

## 2017-12-05 PROCEDURE — 86301 IMMUNOASSAY TUMOR CA 19-9: CPT

## 2017-12-05 PROCEDURE — 47000 NEEDLE BIOPSY OF LIVER PERQ: CPT

## 2017-12-05 PROCEDURE — 87205 SMEAR GRAM STAIN: CPT

## 2017-12-05 PROCEDURE — 89051 BODY FLUID CELL COUNT: CPT

## 2017-12-05 PROCEDURE — 97530 THERAPEUTIC ACTIVITIES: CPT

## 2017-12-05 PROCEDURE — 87070 CULTURE OTHR SPECIMN AEROBIC: CPT

## 2017-12-05 PROCEDURE — 80048 BASIC METABOLIC PNL TOTAL CA: CPT

## 2017-12-05 PROCEDURE — 82945 GLUCOSE OTHER FLUID: CPT

## 2017-12-05 PROCEDURE — 76000 FLUOROSCOPY <1 HR PHYS/QHP: CPT

## 2017-12-05 PROCEDURE — 84134 ASSAY OF PREALBUMIN: CPT

## 2017-12-05 PROCEDURE — 36415 COLL VENOUS BLD VENIPUNCTURE: CPT

## 2017-12-05 PROCEDURE — 86900 BLOOD TYPING SEROLOGIC ABO: CPT

## 2017-12-05 PROCEDURE — 71045 X-RAY EXAM CHEST 1 VIEW: CPT

## 2017-12-05 PROCEDURE — C1769: CPT

## 2017-12-05 PROCEDURE — C1876: CPT

## 2017-12-05 PROCEDURE — 87040 BLOOD CULTURE FOR BACTERIA: CPT

## 2017-12-05 PROCEDURE — 74328 X-RAY BILE DUCT ENDOSCOPY: CPT

## 2017-12-05 PROCEDURE — 86923 COMPATIBILITY TEST ELECTRIC: CPT

## 2017-12-05 PROCEDURE — C1788: CPT

## 2017-12-05 PROCEDURE — 84311 SPECTROPHOTOMETRY: CPT

## 2017-12-05 PROCEDURE — 99285 EMERGENCY DEPT VISIT HI MDM: CPT | Mod: 25

## 2017-12-05 PROCEDURE — 74183 MRI ABD W/O CNTR FLWD CNTR: CPT

## 2017-12-05 PROCEDURE — 88305 TISSUE EXAM BY PATHOLOGIST: CPT

## 2017-12-05 PROCEDURE — 85730 THROMBOPLASTIN TIME PARTIAL: CPT

## 2017-12-05 PROCEDURE — 36430 TRANSFUSION BLD/BLD COMPNT: CPT

## 2017-12-05 PROCEDURE — 82962 GLUCOSE BLOOD TEST: CPT

## 2017-12-05 PROCEDURE — 85025 COMPLETE CBC W/AUTO DIFF WBC: CPT

## 2017-12-05 PROCEDURE — 87015 SPECIMEN INFECT AGNT CONCNTJ: CPT

## 2017-12-05 PROCEDURE — P9016: CPT

## 2017-12-05 PROCEDURE — 88104 CYTOPATH FL NONGYN SMEARS: CPT

## 2017-12-05 PROCEDURE — 76942 ECHO GUIDE FOR BIOPSY: CPT

## 2017-12-05 PROCEDURE — 87075 CULTR BACTERIA EXCEPT BLOOD: CPT

## 2017-12-05 PROCEDURE — 82378 CARCINOEMBRYONIC ANTIGEN: CPT

## 2017-12-05 PROCEDURE — 83735 ASSAY OF MAGNESIUM: CPT

## 2017-12-05 PROCEDURE — 87252 VIRUS INOCULATION TISSUE: CPT

## 2017-12-05 PROCEDURE — 93970 EXTREMITY STUDY: CPT

## 2017-12-05 PROCEDURE — 71250 CT THORAX DX C-: CPT

## 2017-12-05 PROCEDURE — 87206 SMEAR FLUORESCENT/ACID STAI: CPT

## 2017-12-05 PROCEDURE — 74018 RADEX ABDOMEN 1 VIEW: CPT

## 2017-12-05 PROCEDURE — 82247 BILIRUBIN TOTAL: CPT

## 2017-12-05 PROCEDURE — 88112 CYTOPATH CELL ENHANCE TECH: CPT

## 2017-12-05 PROCEDURE — 86901 BLOOD TYPING SEROLOGIC RH(D): CPT

## 2017-12-05 PROCEDURE — 83986 ASSAY PH BODY FLUID NOS: CPT

## 2017-12-05 PROCEDURE — 80076 HEPATIC FUNCTION PANEL: CPT

## 2017-12-05 PROCEDURE — 84100 ASSAY OF PHOSPHORUS: CPT

## 2017-12-05 PROCEDURE — 97162 PT EVAL MOD COMPLEX 30 MIN: CPT

## 2017-12-05 PROCEDURE — 85610 PROTHROMBIN TIME: CPT

## 2017-12-05 PROCEDURE — C1724: CPT

## 2017-12-05 PROCEDURE — 82248 BILIRUBIN DIRECT: CPT

## 2017-12-05 PROCEDURE — 84157 ASSAY OF PROTEIN OTHER: CPT

## 2017-12-05 PROCEDURE — 93005 ELECTROCARDIOGRAM TRACING: CPT

## 2017-12-05 PROCEDURE — 94640 AIRWAY INHALATION TREATMENT: CPT

## 2017-12-05 PROCEDURE — 71046 X-RAY EXAM CHEST 2 VIEWS: CPT

## 2017-12-05 PROCEDURE — 88173 CYTOPATH EVAL FNA REPORT: CPT

## 2017-12-05 PROCEDURE — 83615 LACTATE (LD) (LDH) ENZYME: CPT

## 2017-12-05 RX ORDER — MONTELUKAST 4 MG/1
0 TABLET, CHEWABLE ORAL
Qty: 0 | Refills: 0 | COMMUNITY

## 2017-12-05 RX ADMIN — Medication 180 MILLIGRAM(S): at 05:49

## 2017-12-05 RX ADMIN — PANTOPRAZOLE SODIUM 40 MILLIGRAM(S): 20 TABLET, DELAYED RELEASE ORAL at 05:51

## 2017-12-05 RX ADMIN — Medication 100 MILLIGRAM(S): at 05:49

## 2017-12-05 RX ADMIN — BENZOCAINE AND MENTHOL 1 LOZENGE: 5; 1 LIQUID ORAL at 05:54

## 2017-12-05 RX ADMIN — Medication 12.5 MILLIGRAM(S): at 05:50

## 2017-12-05 RX ADMIN — Medication 2.5 MILLIGRAM(S): at 05:51

## 2017-12-05 RX ADMIN — HEPARIN SODIUM 5000 UNIT(S): 5000 INJECTION INTRAVENOUS; SUBCUTANEOUS at 05:50

## 2017-12-05 NOTE — PROGRESS NOTE ADULT - PROVIDER SPECIALTY LIST ADULT
Gastroenterology
Internal Medicine
Internal Medicine
Palliative Care
Palliative Care
Pulmonology
Surgery
Gastroenterology
Surgery
Palliative Care
Pulmonology
Palliative Care
Palliative Care

## 2017-12-05 NOTE — PROGRESS NOTE ADULT - ASSESSMENT
Patient is a 77 year old pleasant male with a PMHx of HTN, HLD, arrhythmia, and BPH who was admitted to [facility] on 11/15 at the request of his PCP for an abnormal CT and found to have metastatic cholangiocarcinoma

## 2017-12-05 NOTE — PROGRESS NOTE ADULT - SUBJECTIVE AND OBJECTIVE BOX
ON: BRAD PHAM. D/c to hospice in AM. Breathing comfortably.  12/4: uncomfortable going home, wife did not have adequate PleurX teaching. She was taught bedisde by pulmonary fellow. Will be dc'd tomorrow AM ON: [facility]AM. D/c to hospice in AM. Breathing comfortably.  12/4: uncomfortable going home, wife did not have adequate PleurX teaching. She was taught bedisde by pulmonary fellow. Will be dc'd tomorrow AM       SUBJECTIVE: Patient seen and examined bedside by chief resident. No SOB. Resting comfortably    diltiazem    milliGRAM(s) Oral daily  heparin  Injectable 5000 Unit(s) SubCutaneous every 8 hours  hydrochlorothiazide 12.5 milliGRAM(s) Oral daily  tamsulosin 0.4 milliGRAM(s) Oral at bedtime      Vital Signs Last 24 Hrs  T(C): 36.6 (05 Dec 2017 05:26), Max: 37.3 (04 Dec 2017 20:22)  T(F): 97.9 (05 Dec 2017 05:26), Max: 99.2 (04 Dec 2017 20:22)  HR: 94 (05 Dec 2017 05:26) (91 - 98)  BP: 122/79 (05 Dec 2017 05:26) (117/78 - 122/79)  BP(mean): --  RR: 16 (05 Dec 2017 05:26) (16 - 17)  SpO2: 100% (05 Dec 2017 05:26) (95% - 100%)  I&O's Detail    04 Dec 2017 07:01  -  05 Dec 2017 07:00  --------------------------------------------------------  IN:    Oral Fluid: 550 mL  Total IN: 550 mL    OUT:    Voided: 450 mL  Total OUT: 450 mL    Total NET: 100 mL          General: NAD, resting comfortably in bed  C/V: NSR  Pulm: Nonlabored breathing, no respiratory distress  Abd: soft, NT/ND.  Extrem: WWP, no edema, SCDs in place        LABS:                RADIOLOGY & ADDITIONAL STUDIES:

## 2017-12-05 NOTE — PROGRESS NOTE ADULT - PROBLEM SELECTOR PLAN 5
plan for D/C home with home hospice today. Spoke with CORNELIUS santoyo and p/u scheduled later this morning. Wife educated on use of pleurax.  DME delivered yesterday Hospice nurse to visit pt this afternoon

## 2017-12-05 NOTE — PROGRESS NOTE ADULT - ASSESSMENT
78 yo M with PMH HTN, HLD, arrhythmia, BPH with metastatic cholangiocarcinoma with spread to liver and R pleural effusion    1) Primary cholangiocarcinoma with mets to omentum, liver, lung, now s/p ERCP & stenting w/ improvement of hyperbilirubinemia after decompression of encased RHD/LHD/CHD  - Liver bx shows adenocarcinoma compatible w/ metastatic cholangiocarcinoma  - palliative consult: setting up home hospice  - not a candidate for palliative chemotherapy 2/2 deconditioning and poor functional status (on O2, does not get out of bed with PT)    2) R. Pleural effusion, s/p drainage  - Pleurex in place with plan for auto-pleurodesis  - Pulmonology following (Payam), recommending catheter to stay for 1 month s/p placement on 11/30    3) HTN  - diltiazem, HCTZ    4) BPH  - tamsulosin    5) HLD  - Atorvastatin 10 mg    6) LGIB possibly 2/2 sphincterotomy during ERCP (negative capsule study, negative colonoscopy)  - stable    7) Dispo: Home hospice

## 2017-12-05 NOTE — PROGRESS NOTE ADULT - PROBLEM SELECTOR PLAN 2
S/p PleurX placement with improvement of symptoms. Cytology + for adenocarcinoma, consistent with cholangiocarcinoma.
S/p PleurX placement with improvement of symptoms. Cytology + for adenocarcinoma, consistent with cholangiocarcinoma
S/p PleurX placement with improvement of symptoms. Cytology + for adenocarcinoma, consistent with cholangiocarcinoma.
S/p PleurX placement with improvement of symptoms. Cytology + for adenocarcinoma, consistent with cholangiocarcinoma.
S/p chest tube placement by pulm, likely 2/2 mets, now removed. Plan for PleurX placement tomorrow morning.
related to increase in the effusion and atelectasis.  Improved after drainage
related to increase in the effusion and atelectasis.  Improved after drainage
related to increase in the effusion and atelectasis.  Improved after drainage of 3000 cc
related to increase in the effusion and atelectasis.  Improved after drainage of 3000 cc; now with pleurx catheter in
related to increase in the effusion and atelectasis.  Improved after drainage of 1300 cc
related to increase in the effusion and atelectasis.  Improved after drainage
related to increase in the effusion and atelectasis.  Improved after drainage

## 2017-12-05 NOTE — PROGRESS NOTE ADULT - PROBLEM SELECTOR PROBLEM 3
Anorexia
Anorexia
Anemia
Anorexia
Anorexia
Atelectasis

## 2017-12-05 NOTE — PROGRESS NOTE ADULT - PROBLEM SELECTOR PLAN 3
Likely sequelae of cancer. Started Marinol 11/28, still without effect  -Can titrate up to BID in several days if needed. Patient and wife agree to plan.
Likely sequelae of cancer. Started marinol 11/28  -Can titrate up to BID in several days if needed. Patient and wife agree to plan.
Likely 2/2 LGIB. S/p colonoscopy revealing old blood but no active bleeding. Awaiting capsule study. H/H stable.
Likely sequelae of cancer. Started Marinol 11/28, still without effect  -Can titrate up to BID in several days if needed. Patient and wife agree to plan.
Likely sequelae of cancer. Started Marinol 11/28, still without effect  -Can titrate up to BID in several days if needed. Patient and wife agree to plan.
related to the large pleural effusion
related to the large pleural effusion  continue IS
related to the large pleural effusion  continue IS
related to the large pleural effusion
related to the large pleural effusion  continue IS
related to the large pleural effusion

## 2017-12-05 NOTE — PROGRESS NOTE ADULT - PROBLEM SELECTOR PROBLEM 4
Palliative care by specialist
Palliative care by specialist
Anorexia
Cholangiocarcinoma
Cholangiocarcinoma
Palliative care by specialist
Palliative care by specialist
Cholangiocarcinoma

## 2017-12-05 NOTE — PROGRESS NOTE ADULT - SUBJECTIVE AND OBJECTIVE BOX
JAVAN KEYES   MRN-[id number]    CC: newly dx cholangiocarcinoma with mets    HPI:  Patient is a 77 year old pleasant male with a PMHx of HTN, HLD, arrhythmia, and BPH who was admitted to [facility] on 11/15 at the request of his PCP for an abnormal CT. 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 ERCP was done 11/16, with sphincterotomy and placement of two metal stents in the R and L hepatic ducts. Hospital course was complicated by melena that has since resolved and a R malignant pleural effusions now with chest tube. Palliative care was consulted as the patient is not a surgical or chemotherapy candidate.    The patient's biggest concern is his lack of appetite. He reports diminished taste and hunger but denies nausea or vomiting as being the etiology of his poor appetite. He said his appetite began to decline about a month ago with subsequent weight loss as well. At this time, the patient is so weak that he can barely get out of bed or feed himself should he try. He says he wants to beat the cancer but doesn't think it will be possible given the extent of the disease and what the physicians have told him about his prognosis. The patient only has pain in his right abdomen when he moves but otherwise reports feeling comfortable.    Patient reports improved dyspnea since PleurX was placed Awaiting D/C later this morning   DYSPNEA: N	  NAUS/VOM: N	  SECRETIONS: N	  AGITATION: N  Pain pt denies      PEx:  T(C): 36.9 (12-05-17 @ 08:30), Max: 37.3 (12-04-17 @ 20:22)  HR: 91 (12-05-17 @ 08:30) (91 - 98)  BP: 105/71 (12-05-17 @ 08:30) (105/71 - 122/79)  RR: 16 (12-05-17 @ 08:30) (16 - 16)  SpO2: 100% (12-05-17 @ 08:30) (95% - 100%)  Wt(kg): --    General: laying comfortably in bed  HEENT: A/T N/C  MMM  Neck: supple  no JVD  CVS:  S1S2  RRR tachycardic  Resp: CTA B/ l, .  right PleurX    GI:  distended, tympanic,  non tender. hyperactive BS  :  voiding freely  Musc:  no evidence of muscle atrophy  Neuro: no focal deficits,aox3  Psych:  calm and cooperative  Skin: anasarca, jaundice  Lymph: No LAD    	     erythromycin (Other)      OPIATE NAÏVE (Y/N): N  iSTOP REVIEWED (Y/N): YES on initial consult    MEDICATIONS: REVIEWED  MEDICATIONS  (STANDING):  atorvastatin 10 milliGRAM(s) Oral at bedtime  diltiazem    milliGRAM(s) Oral daily  diphenhydrAMINE   Injectable 25 milliGRAM(s) IV Push once  docusate sodium 100 milliGRAM(s) Oral two times a day  ferrous    sulfate 325 milliGRAM(s) Oral daily  fluticasone propionate 50 MICROgram(s)/spray Nasal Spray 1 Spray(s) Both Nostrils at bedtime  heparin  Injectable 5000 Unit(s) SubCutaneous every 8 hours  hydrochlorothiazide 12.5 milliGRAM(s) Oral daily  montelukast 10 milliGRAM(s) Oral at bedtime  multivitamin 1 Tablet(s) Oral daily  pantoprazole    Tablet 40 milliGRAM(s) Oral before breakfast  senna 2 Tablet(s) Oral at bedtime  tamsulosin 0.4 milliGRAM(s) Oral at bedtime    MEDICATIONS  (PRN):  benzocaine 15 mG/menthol 3.6 mG Lozenge 1 Lozenge Oral four times a day PRN Sore Throat  HYDROmorphone  Injectable 0.5 milliGRAM(s) IV Push every 4 hours PRN Severe Pain (7 - 10)  ondansetron Injectable 4 milliGRAM(s) IV Push every 6 hours PRN Nausea      LABS: no new labs    IMAGING: no new imaging    Advanced Directives:  Full Code      Decision maker: Patient   HCP: Cassie (wife) [phone]. Copy in chart    GOALS OF CARE DISCUSSION       Palliative care info/counseling provided	           Documentation of GOC: plans for HH on Tuesday in the AM, equipment to be delivered today. Wife taught use of pleurax this AM  		        Palliative Med        Unit SW/Case Mgmt       Patient/Family Support       Massage Therapy       Music Therapy       Hospice       Nutrition       PT/OT

## 2017-12-05 NOTE — PROGRESS NOTE ADULT - PROBLEM SELECTOR PROBLEM 1
Cholangiocarcinoma
Cholangiocarcinoma
Pleural effusion
Cholangiocarcinoma
Cholangiocarcinoma
Pleural effusion
Cholangiocarcinoma

## 2017-12-05 NOTE — PROGRESS NOTE ADULT - PROBLEM SELECTOR PROBLEM 5
Advance care planning
Palliative care by specialist

## 2017-12-05 NOTE — PROGRESS NOTE ADULT - PROBLEM SELECTOR PROBLEM 2
Pleural effusion
Pleural effusion
NEWELL (dyspnea on exertion)
Pleural effusion
NEWELL (dyspnea on exertion)

## 2017-12-05 NOTE — PROGRESS NOTE ADULT - PROBLEM SELECTOR PLAN 4
Patient's biggest fear is leaving his wife alone, since she can not drive or use the computer to pay bills.   Support provided to patient and family. Patient to have access to supportive services during rest of hospital stay as the patient/family deemed necessary ie. Chaplaincy, Massage therapy, Music therapy, Patient and family supportive services, Palliative SW, etc.  Reviewed patients PSSA screening  noting the patient would benefit from: music/massage therapy.
Support provided to patient and family. Patient to have access to supportive services during rest of hospital stay as the patient/family deemed necessary ie. Chaplaincy, Massage therapy, Music therapy, Patient and family supportive services, Palliative SW, etc.  Reviewed patients PSSA screening  noting the patient would benefit from: music/massage therapy.
Care per surgery  agree w/ palliative care consultation
Care per surgery  agree w/ palliative care consultation
Likely sequelae of cancer. Started marinol yesterday  -Can titrate up to BID in several days if needed. Patient and wife agree to plan.
Patient's biggest fear is leaving his wife alone, since she can not drive or use the computer to pay bills.   Support provided to patient and family. Patient to have access to supportive services during rest of hospital stay as the patient/family deemed necessary ie. Chaplaincy, Massage therapy, Music therapy, Patient and family supportive services, Palliative SW, etc.  Reviewed patients PSSA screening  noting the patient would benefit from: music/massage therapy.
Patient's biggest fear is leaving his wife alone, since she can not drive or use the computer to pay bills.   Support provided to patient and family. Patient to have access to supportive services during rest of hospital stay as the patient/family deemed necessary ie. Chaplaincy, Massage therapy, Music therapy, Patient and family supportive services, Palliative SW, etc.  Reviewed patients PSSA screening  noting the patient would benefit from: music/massage therapy.
Care per surgery  agree w/ palliative care consultation

## 2017-12-23 LAB
CULTURE RESULTS: SIGNIFICANT CHANGE UP
SPECIMEN SOURCE: SIGNIFICANT CHANGE UP

## 2018-01-13 LAB
CULTURE RESULTS: SIGNIFICANT CHANGE UP
SPECIMEN SOURCE: SIGNIFICANT CHANGE UP

## 2022-10-10 NOTE — PROGRESS NOTE ADULT - PROBLEM/PLAN-2
DISPLAY PLAN FREE TEXT
yes

## 2025-01-28 NOTE — ED ADULT TRIAGE NOTE - NSWEIGHTCALCTOOLDRUG_GEN_A_CORE
[facility] [place] [place] [facility]  C9 APPROVED FOR PHYSICAL THERAPY 2 X'S WEEK FOR 6 WEEKS 01-- 02-    I called and left a message  
BLEEDING
 used